# Patient Record
Sex: FEMALE | Race: WHITE | NOT HISPANIC OR LATINO | Employment: UNEMPLOYED | URBAN - METROPOLITAN AREA
[De-identification: names, ages, dates, MRNs, and addresses within clinical notes are randomized per-mention and may not be internally consistent; named-entity substitution may affect disease eponyms.]

---
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
## 2019-01-22 ENCOUNTER — INITIAL PRENATAL (OUTPATIENT)
Dept: OBGYN | Facility: CLINIC | Age: 19
End: 2019-01-22
Payer: COMMERCIAL

## 2019-01-22 ENCOUNTER — HOSPITAL ENCOUNTER (OUTPATIENT)
Facility: MEDICAL CENTER | Age: 19
End: 2019-01-22
Attending: NURSE PRACTITIONER
Payer: COMMERCIAL

## 2019-01-22 VITALS — BODY MASS INDEX: 28.65 KG/M2 | DIASTOLIC BLOOD PRESSURE: 58 MMHG | SYSTOLIC BLOOD PRESSURE: 110 MMHG | WEIGHT: 194 LBS

## 2019-01-22 DIAGNOSIS — Z34.81 ENCOUNTER FOR SUPERVISION OF OTHER NORMAL PREGNANCY, FIRST TRIMESTER: ICD-10-CM

## 2019-01-22 DIAGNOSIS — Z86.59 HISTORY OF ANXIETY: ICD-10-CM

## 2019-01-22 LAB
APPEARANCE UR: NORMAL
BILIRUB UR STRIP-MCNC: NORMAL MG/DL
COLOR UR AUTO: NORMAL
GLUCOSE UR STRIP.AUTO-MCNC: NEGATIVE MG/DL
KETONES UR STRIP.AUTO-MCNC: NEGATIVE MG/DL
LEUKOCYTE ESTERASE UR QL STRIP.AUTO: NEGATIVE
NITRITE UR QL STRIP.AUTO: NEGATIVE
PH UR STRIP.AUTO: 5.5 [PH] (ref 5–8)
PROT UR QL STRIP: NORMAL MG/DL
RBC UR QL AUTO: NEGATIVE
SP GR UR STRIP.AUTO: 1.03
UROBILINOGEN UR STRIP-MCNC: NORMAL MG/DL

## 2019-01-22 PROCEDURE — 81002 URINALYSIS NONAUTO W/O SCOPE: CPT | Performed by: NURSE PRACTITIONER

## 2019-01-22 PROCEDURE — 87480 CANDIDA DNA DIR PROBE: CPT

## 2019-01-22 PROCEDURE — 59401 PR NEW OB VISIT: CPT | Performed by: NURSE PRACTITIONER

## 2019-01-22 PROCEDURE — 87491 CHLMYD TRACH DNA AMP PROBE: CPT

## 2019-01-22 PROCEDURE — 87660 TRICHOMONAS VAGIN DIR PROBE: CPT

## 2019-01-22 PROCEDURE — 87591 N.GONORRHOEAE DNA AMP PROB: CPT

## 2019-01-22 PROCEDURE — 87510 GARDNER VAG DNA DIR PROBE: CPT

## 2019-01-22 RX ORDER — PNV NO.95/FERROUS FUM/FOLIC AC 28MG-0.8MG
1 TABLET ORAL DAILY
Qty: 30 TAB | Refills: 12 | Status: SHIPPED | OUTPATIENT
Start: 2019-01-22 | End: 2019-01-22

## 2019-01-22 NOTE — LETTER
2019      Jessie Carney is currently pregnant and being cared for by [facility].     She is medically cleared for:    1. Dental exams and routine cleaning  2. Tooth fillings and extractions as needed  3. Antibiotic therapy as appropriate  4. Local anesthesia  5. Abdominal shield for xrays    Patient may be administered the followin% Lidocaine with 1:100,000 Epinephrine  4% Septocaine with 1:100,000 Epinephrine  Nitrous Oxide  A narcotic or non-narcotic pain medication  An antibiotic such as penicillin or clindamycin    NO TETRACYCLINE and NO CODEINE and NO Ibuprofen        Thank you,          RENE Vincent.    Electronically Signed

## 2019-01-22 NOTE — LETTER
Cystic Fibrosis Carrier Testing  Jessie Carney    The following information is about a blood test that can be done to determine if you and/or your partner carry the gene for cystic fibrosis.    WHAT IS CYSTIC FIBROSIS?  · Cystic fibrosis (CF) is an inherited disease that affects more than 25,000 [place] children and young adults.  · Symptoms of CF vary but include lung congestion, pneumonia, diarrhea and poor growth.  Most people with CF have severe medical problems and some die at a young age.  Others have so few symptoms they are unaware they have CF.  · CF does not affect intelligence.  · Although there is no cure for CF at this time, scientists are making progress in improving treatment and in searching for a cure.  In the past many people with CF  at a very young age.  Today, many are living into their 20’s and 30’s.    IS THERE A CHANCE MY BABY COULD HAVE CYSTIC FIBROSIS?  · You can have a child with CF even if there is no history in your family (see chart below).  · CF testing can help determine if you are a carrier and at risk to have a child with CF.  Note: if both parents are carriers, there is a 1 in 4 (25%) chance with each pregnancy that they will have a child with CF.  · Carriers have one normal CF gene and one altered CF gene.  · People with CF have two altered CF genes.  · Most people have two normal copies of the CF gene.    Approximate risk that a couple with no family history of cystic fibrosis will have a child with cystic fibrosis:    Ethnic background / Risk     couple:  1 in 2,500   couple:  1 in 15,000            couple:  1 in 8,000     American couple:  1 in 32,000     WHAT TESTING IS AVAILABLE?  · There is a blood test that can be done to find out if you or your partner is a carrier.  · It is important to understand that CF carrier testing does not detect all CF carriers.  · If the test shows that you are both CF carriers, you unborn baby can  be tested to find out if the baby has CF.    HOW MUCH DOES IT COST TO HAVE CYSTIC FIBROSIS CARRIER TESTING?  · Cost and insurance coverage for CF carrier testing vary depending upon the laboratory used and your insurance policy.  · The average cost for CF carrier testing is $300 per person.  · Your genetic counselor can provide you with more information about cystic fibrosis carrier testing.    _____  Yes, I am interested in discussing carrier testing with a genetic counselor.    _____  No, I am not interested in CF carrier testing or in receiving more information about CF carrier testing.      Client signature: ________________________________________  1/22/2019

## 2019-01-22 NOTE — PROGRESS NOTES
"Subjective:   Jessie Carney is a 18 y.o.  who presents for her new OB exam.  She is 18w6d with an ABBEY of Estimated Date of Delivery: 19 by . She is feeling well and has no concerns at this time. Denies VB, LOF, contractions or pain. No ER visits or previous care in this pregnancy. Denies dysuria, vaginal DC, fever. Reports fetal movement. Desires AFP.  Declines CF.   Reports \"loosing mucous plug\" as she did with last pregnancy, boogery yellow green that comes out a little bit, no itch, no odor or irritation.     Past Medical History:   Diagnosis Date   • Anxiety and depression    • Asthma     Dx at age 1. not on meds   • Migraine        Psych Hx: Reports anxiety and depression on and off, currently stable, more easily irritable from pregnancy hormones.     Past Surgical History:   Procedure Laterality Date   • ANTONIO BY LAPAROSCOPY  8/3/2018    Procedure: ANTONIO BY LAPAROSCOPY;  Surgeon: Germán Watts M.D.;  Location: SURGERY [facility];  Service: General   • CYSTECTOMY  2018    face cyst removal Right side        OB History    Para Term  AB Living   4 1 1   2 1   SAB TAB Ectopic Molar Multiple Live Births   2         1      # Outcome Date GA Lbr Ralf/2nd Weight Sex Delivery Anes PTL Lv   4 Current            3 Term 17 40w1d  3.685 kg (8 lb 2 oz) F Vag-Spont None N HYUN      Birth Comments: IOL. pt states no complications   2 2016     SAB         Birth Comments: Passed on its own   2015     SAB         Birth Comments: passed on its own           Gynecological Hx: Denies any hx of STIs, including HSV. Denies any vulvovaginal disorders and no hx of abnormal cervical cytology. Last pap none.     Sexual Hx: One current male partner, who is FOB     Contraceptive Hx: Has used pills in the past and has since discontinued use.     Family History   Problem Relation Age of Onset   • No Known Problems Mother    • No Known Problems Father    • No Known Problems Brother  " "  • Cancer Maternal Grandmother    • No Known Problems Maternal Grandfather    • No Known Problems Paternal Grandmother    • No Known Problems Paternal Grandfather      Denies any genetic disorders in family history.     Social History     Social History   • Marital status: Single     Spouse name: N/A   • Number of children: N/A   • Years of education: N/A     Occupational History   • Not on file.     Social History Main Topics   • Smoking status: Current Some Day Smoker     Packs/day: 0.50     Years: 3.00     Types: Cigarettes   • Smokeless tobacco: Never Used      Comment: Trying to quit, cut back to 3-4 cigarettes/per day   • Alcohol use No   • Drug use: Yes     Types: Marijuana      Comment: last used \"few days ago\"   • Sexual activity: Yes     Partners: Male      Comment: None     Other Topics Concern   • Not on file     Social History Narrative   • No narrative on file       FOB is involved and lives with Jessie Carney.  Pregnancy is unplanned but desired.    She is currently not working, denies any heavy lifting or exposure to potential teratogens like environmental or occupational toxins.   Denies alcohol use. Reports tobacco use 3-4 a day, Reports marijuana use 1-2 week.   Denies any current or hx of sexual, emotional or physical abuse or trauma.     Current Medications: PNV   Allergies: Denies allergies to medications, food, or environmental allergies    Objective:      Vitals:    01/22/19 1532   BP: 110/58   Weight: 88 kg (194 lb)        See Prenatal Physical and Prenatal Vitals  UA WNL today      Assessment:      1.  IUP @ 18w6d per       2.  S=D      3.  See problem list as follows       Patient Active Problem List    Diagnosis Date Noted   • Penicillin allergy 09/22/2017         Plan:   -  GC/CT today   - Prenatal labs ordered - lab slip given  - AFP ordered   - Discussed PNV, nutrition, adequate water intake, and exercise/weight gain in pregnancy  - NOB informational packet with anticipatory " guidance given  - Information on Centering Pregnancy given, pt declines   - S/sx of pregnancy warning signs and PTL precautions given  - Complete OB US in 1 wks  - Return to [facility] in 4 wks

## 2019-01-22 NOTE — PROGRESS NOTES
"Pt here today for NOB visit  LMP: Unknown   Pt had  done on 12/04/2018 here in [facility] done by Dr. Nur.   WT: 194 lb  BP: 110/58  Pt states \" I've been leaking pieces or parts of my mucus plug\" states it looks \"snout and slimy\". States no concerns or complaints today.    Desires AFP   Influenza vaccine offered to patient today, pt states she will like to get the vaccine at her next appt.   Good # [phone]  "

## 2019-01-23 LAB
C TRACH DNA SPEC QL NAA+PROBE: NEGATIVE
CANDIDA DNA VAG QL PROBE+SIG AMP: POSITIVE
G VAGINALIS DNA VAG QL PROBE+SIG AMP: NEGATIVE
N GONORRHOEA DNA SPEC QL NAA+PROBE: NEGATIVE
SPECIMEN SOURCE: NORMAL
T VAGINALIS DNA VAG QL PROBE+SIG AMP: NEGATIVE

## 2019-02-05 ENCOUNTER — TELEPHONE (OUTPATIENT)
Dept: OBGYN | Facility: CLINIC | Age: 19
End: 2019-02-05

## 2019-02-05 NOTE — TELEPHONE ENCOUNTER
----- Message from ROYER Vincent sent at 1/23/2019 10:01 AM [facility] -----  Please let pt know we saw yeast on her swab but it could be something normal if she is not having any symptoms like itching or irritation which she was not reporting yesterday. She can do treatment monistat 7 nights if she likes or if she feels it is something normal, no treatment is necessary.      Called patient, no answer. Left VM asking patient to give us a call back to go over her most recent test results.

## 2019-02-06 ENCOUNTER — DATING (OUTPATIENT)
Dept: OBGYN | Facility: CLINIC | Age: 19
End: 2019-02-06

## 2019-02-06 ENCOUNTER — APPOINTMENT (OUTPATIENT)
Dept: RADIOLOGY | Facility: IMAGING CENTER | Age: 19
End: 2019-02-06
Attending: NURSE PRACTITIONER
Payer: COMMERCIAL

## 2019-02-06 DIAGNOSIS — Z34.81 ENCOUNTER FOR SUPERVISION OF OTHER NORMAL PREGNANCY, FIRST TRIMESTER: ICD-10-CM

## 2019-02-06 DIAGNOSIS — Z36.3 ANTENATAL SCREENING FOR MALFORMATION USING ULTRASONICS: ICD-10-CM

## 2019-02-06 PROCEDURE — 76805 OB US >/= 14 WKS SNGL FETUS: CPT | Performed by: OBSTETRICS & GYNECOLOGY

## 2019-02-06 PROCEDURE — 99999 US-OB 2ND 3RD TRI COMPLETE: CPT | Mod: 26 | Performed by: NURSE PRACTITIONER

## 2019-02-13 ENCOUNTER — TELEPHONE (OUTPATIENT)
Dept: OBGYN | Facility: CLINIC | Age: 19
End: 2019-02-13

## 2019-02-13 NOTE — TELEPHONE ENCOUNTER
GETACHEW to call back. GEOFFREY garcia.       Notes recorded by Bere Woody M.D. on 2/6/2019 at 4:19 PM PST  Patient needs a repeat ultrasound of the baby's heart in 2 weeks to ensure the anatomy is normal, please get her scheduled and let her know that we ordered a repeat u/s      ----- Message from ROYER Vincent sent at 1/23/2019 10:01 AM [facility] -----  Please let pt know we saw yeast on her swab but it could be something normal if she is not having any symptoms like itching or irritation which she was not reporting yesterday. She can do treatment monistat 7 nights if she likes or if she feels it is something normal, no treatment is necessary.

## 2019-02-19 ENCOUNTER — ROUTINE PRENATAL (OUTPATIENT)
Dept: OBGYN | Facility: CLINIC | Age: 19
End: 2019-02-19
Payer: COMMERCIAL

## 2019-02-19 ENCOUNTER — HOSPITAL ENCOUNTER (OUTPATIENT)
Dept: LAB | Facility: MEDICAL CENTER | Age: 19
End: 2019-02-19
Attending: NURSE PRACTITIONER
Payer: COMMERCIAL

## 2019-02-19 VITALS — SYSTOLIC BLOOD PRESSURE: 100 MMHG | BODY MASS INDEX: 30.42 KG/M2 | DIASTOLIC BLOOD PRESSURE: 54 MMHG | WEIGHT: 206 LBS

## 2019-02-19 DIAGNOSIS — Z34.81 ENCOUNTER FOR SUPERVISION OF OTHER NORMAL PREGNANCY, FIRST TRIMESTER: ICD-10-CM

## 2019-02-19 DIAGNOSIS — Z34.80 SUPERVISION OF OTHER NORMAL PREGNANCY, ANTEPARTUM: Primary | ICD-10-CM

## 2019-02-19 LAB
ABO GROUP BLD: NORMAL
BLD GP AB SCN SERPL QL: NORMAL
HIV 1+2 AB+HIV1 P24 AG SERPL QL IA: NON REACTIVE
RH BLD: NORMAL

## 2019-02-19 PROCEDURE — 86901 BLOOD TYPING SEROLOGIC RH(D): CPT

## 2019-02-19 PROCEDURE — 36415 COLL VENOUS BLD VENIPUNCTURE: CPT

## 2019-02-19 PROCEDURE — 81511 FTL CGEN ABNOR FOUR ANAL: CPT

## 2019-02-19 PROCEDURE — 81001 URINALYSIS AUTO W/SCOPE: CPT

## 2019-02-19 PROCEDURE — 86850 RBC ANTIBODY SCREEN: CPT

## 2019-02-19 PROCEDURE — 87389 HIV-1 AG W/HIV-1&-2 AB AG IA: CPT

## 2019-02-19 PROCEDURE — 86900 BLOOD TYPING SEROLOGIC ABO: CPT

## 2019-02-19 PROCEDURE — 87340 HEPATITIS B SURFACE AG IA: CPT

## 2019-02-19 PROCEDURE — 90040 PR PRENATAL FOLLOW UP: CPT | Performed by: NURSE PRACTITIONER

## 2019-02-19 PROCEDURE — 86780 TREPONEMA PALLIDUM: CPT

## 2019-02-19 PROCEDURE — 85025 COMPLETE CBC W/AUTO DIFF WBC: CPT

## 2019-02-19 PROCEDURE — 86762 RUBELLA ANTIBODY: CPT

## 2019-02-19 RX ORDER — PNV NO.95/FERROUS FUM/FOLIC AC 28MG-0.8MG
TABLET ORAL
Status: ON HOLD | COMMUNITY
End: 2023-02-09

## 2019-02-19 NOTE — PROGRESS NOTES
OB f/u. + fetal movement.  No VB, LOF or UC's.  Wt: 206lb      BP:100/54  Good phone # [phone]  Preferred pharmacy confirmed.  PNP, AFP not done yet planing to go today.  FLU vaccine decline.  Patient denies any vaginal itching or irritation.   FYI: Notes recorded by Bere Woody M.D. on 2/6/2019 at 4:19 PM PST  Patient needs a repeat ultrasound of the baby's heart in 2 weeks to ensure the anatomy is normal, please get her scheduled and let her know that we ordered a repeat u/s

## 2019-02-20 LAB
AMORPH CRY #/AREA URNS HPF: PRESENT /HPF
APPEARANCE UR: ABNORMAL
BACTERIA #/AREA URNS HPF: ABNORMAL /HPF
BASOPHILS # BLD AUTO: 0.7 % (ref 0–1.8)
BASOPHILS # BLD: 0.08 K/UL (ref 0–0.12)
BILIRUB UR QL STRIP.AUTO: NEGATIVE
CAOX CRY #/AREA URNS HPF: ABNORMAL /HPF
COLOR UR: YELLOW
EOSINOPHIL # BLD AUTO: 0.22 K/UL (ref 0–0.51)
EOSINOPHIL NFR BLD: 1.9 % (ref 0–6.9)
EPI CELLS #/AREA URNS HPF: ABNORMAL /HPF
ERYTHROCYTE [DISTWIDTH] IN BLOOD BY AUTOMATED COUNT: 45 FL (ref 35.9–50)
GLUCOSE UR STRIP.AUTO-MCNC: NEGATIVE MG/DL
HBV SURFACE AG SER QL: NEGATIVE
HCT VFR BLD AUTO: 37.9 % (ref 37–47)
HGB BLD-MCNC: 12.6 G/DL (ref 12–16)
HYALINE CASTS #/AREA URNS LPF: ABNORMAL /LPF
IMM GRANULOCYTES # BLD AUTO: 0.22 K/UL (ref 0–0.11)
IMM GRANULOCYTES NFR BLD AUTO: 1.9 % (ref 0–0.9)
KETONES UR STRIP.AUTO-MCNC: ABNORMAL MG/DL
LEUKOCYTE ESTERASE UR QL STRIP.AUTO: ABNORMAL
LYMPHOCYTES # BLD AUTO: 2.73 K/UL (ref 1–4.8)
LYMPHOCYTES NFR BLD: 23.4 % (ref 22–41)
MCH RBC QN AUTO: 29.9 PG (ref 27–33)
MCHC RBC AUTO-ENTMCNC: 33.2 G/DL (ref 33.6–35)
MCV RBC AUTO: 89.8 FL (ref 81.4–97.8)
MICRO URNS: ABNORMAL
MONOCYTES # BLD AUTO: 0.73 K/UL (ref 0–0.85)
MONOCYTES NFR BLD AUTO: 6.3 % (ref 0–13.4)
NEUTROPHILS # BLD AUTO: 7.7 K/UL (ref 2–7.15)
NEUTROPHILS NFR BLD: 65.8 % (ref 44–72)
NITRITE UR QL STRIP.AUTO: NEGATIVE
NRBC # BLD AUTO: 0 K/UL
NRBC BLD-RTO: 0 /100 WBC
PH UR STRIP.AUTO: 5 [PH]
PLATELET # BLD AUTO: 286 K/UL (ref 164–446)
PMV BLD AUTO: 9.4 FL (ref 9–12.9)
PROT UR QL STRIP: NEGATIVE MG/DL
RBC # BLD AUTO: 4.22 M/UL (ref 4.2–5.4)
RBC # URNS HPF: ABNORMAL /HPF
RBC UR QL AUTO: NEGATIVE
RUBV AB SER QL: 28.1 IU/ML
SP GR UR STRIP.AUTO: 1.03
TREPONEMA PALLIDUM IGG+IGM AB [PRESENCE] IN SERUM OR PLASMA BY IMMUNOASSAY: NON REACTIVE
UROBILINOGEN UR STRIP.AUTO-MCNC: 0.2 MG/DL
WBC # BLD AUTO: 11.7 K/UL (ref 4.8–10.8)
WBC #/AREA URNS HPF: ABNORMAL /HPF

## 2019-02-20 NOTE — PROGRESS NOTES
S) Pt is a 18 y.o.   at 22w6d  gestation. Routine prenatal care today. No complaints today. US order given to patient, will schedule appt today. Has not yet done any blood work, she is instructed to go after this appointment to get them drawn. Discussed Glucose testing at next appointment.  labor precautions discussed, all questions answered.    Fetal movement Normal  Cramping no  VB no  LOF no   Denies dysuria. Generally feels well today. Good self-care activities identified. Denies headaches, swelling, visual changes, or epigastric pain .     O) Blood pressure 100/54, weight 93.4 kg (206 lb), currently breastfeeding.        Labs:       PNL: Not done yet       GCT: Too early        AFP: Not done yet       GBS: N/A       Pertinent ultrasound -        19- Survey with questionable right sided myocardium thickening, remainder WNL, REYNA 14.19cm, c/w prev dating. Needs repeat scan in 2 weeks.    Repeat US scan scheduled 19    A) IUP at 22w6d       S=D         Patient Active Problem List    Diagnosis Date Noted   • Supervision of other normal pregnancy, antepartum 2019   • History of anxiety and depression 2019   • Penicillin allergy 2017          SVE: deferred       Chaperone offered: n/a         TDAP: no       FLU: no        BTL: no       : n/a       C/S Consent: n/a       IOL or C/S scheduled: no       LAST PAP: deferred due to age         P) s/s ptl vs general discomforts. Fetal movements reviewed. General ed and anticipatory guidance. Nutrition/exercise/vitamin. Plans breast Plans pp contraception- unsure  Continue PNV.

## 2019-02-21 LAB
# FETUSES US: NORMAL
AFP MOM SERPL: 1.13
AFP SERPL-MCNC: 91 NG/ML
AGE - REPORTED: 19.9 YR
CURRENT SMOKER: YES
FAMILY MEMBER DISEASES HX: NO
GA METHOD: NORMAL
GA: NORMAL WK
HCG MOM SERPL: 2.16
HCG SERPL-ACNC: NORMAL IU/L
HX OF HEREDITARY DISORDERS: NO
IDDM PATIENT QL: NO
INHIBIN A MOM SERPL: 1.69
INHIBIN A SERPL-MCNC: 536 PG/ML
INTEGRATED SCN PATIENT-IMP: NORMAL
PATHOLOGY STUDY: NORMAL
SPECIMEN DRAWN SERPL: NORMAL
U ESTRIOL MOM SERPL: 1.16
U ESTRIOL SERPL-MCNC: 3.27 NG/ML

## 2019-02-27 ENCOUNTER — APPOINTMENT (OUTPATIENT)
Dept: RADIOLOGY | Facility: IMAGING CENTER | Age: 19
End: 2019-02-27
Attending: OBSTETRICS & GYNECOLOGY
Payer: COMMERCIAL

## 2019-02-27 DIAGNOSIS — Z36.3 ANTENATAL SCREENING FOR MALFORMATION USING ULTRASONICS: ICD-10-CM

## 2019-02-27 PROCEDURE — 76815 OB US LIMITED FETUS(S): CPT | Performed by: OBSTETRICS & GYNECOLOGY

## 2019-03-06 ENCOUNTER — NON-PROVIDER VISIT (OUTPATIENT)
Dept: OBGYN | Facility: CLINIC | Age: 19
End: 2019-03-06
Payer: COMMERCIAL

## 2019-03-06 ENCOUNTER — TELEPHONE (OUTPATIENT)
Dept: OBGYN | Facility: CLINIC | Age: 19
End: 2019-03-06

## 2019-03-06 DIAGNOSIS — O26.892 DYSURIA DURING PREGNANCY IN SECOND TRIMESTER: ICD-10-CM

## 2019-03-06 DIAGNOSIS — R30.0 DYSURIA DURING PREGNANCY IN SECOND TRIMESTER: ICD-10-CM

## 2019-03-06 LAB
APPEARANCE UR: NORMAL
BILIRUB UR STRIP-MCNC: NORMAL MG/DL
COLOR UR AUTO: NORMAL
GLUCOSE UR STRIP.AUTO-MCNC: NEGATIVE MG/DL
KETONES UR STRIP.AUTO-MCNC: NEGATIVE MG/DL
LEUKOCYTE ESTERASE UR QL STRIP.AUTO: NORMAL
NITRITE UR QL STRIP.AUTO: NEGATIVE
PH UR STRIP.AUTO: 6.5 [PH] (ref 5–8)
PROT UR QL STRIP: NEGATIVE MG/DL
RBC UR QL AUTO: NEGATIVE
SP GR UR STRIP.AUTO: 1.02
UROBILINOGEN UR STRIP-MCNC: NORMAL MG/DL

## 2019-03-06 PROCEDURE — 81002 URINALYSIS NONAUTO W/O SCOPE: CPT | Performed by: PHYSICIAN ASSISTANT

## 2019-03-06 NOTE — TELEPHONE ENCOUNTER
Pt called c/o pain and burning with urination x 10 days and today she started having mid back pain, pt states she is well hydrated and wants to know if she can be seen today. Consulted with FAITH Rea, she recommends patient to come in for nurse visit and give us a urine sample. Pt notified and agrees, scheduled for nursed visit. Pt had no other questions or concerns

## 2019-03-13 DIAGNOSIS — O28.3 ABNORMAL FETAL ULTRASOUND: ICD-10-CM

## 2019-03-19 ENCOUNTER — ROUTINE PRENATAL (OUTPATIENT)
Dept: OBGYN | Facility: CLINIC | Age: 19
End: 2019-03-19
Payer: COMMERCIAL

## 2019-03-19 VITALS — BODY MASS INDEX: 31.31 KG/M2 | WEIGHT: 212 LBS | DIASTOLIC BLOOD PRESSURE: 58 MMHG | SYSTOLIC BLOOD PRESSURE: 110 MMHG

## 2019-03-19 DIAGNOSIS — Z34.80 SUPERVISION OF OTHER NORMAL PREGNANCY, ANTEPARTUM: Primary | ICD-10-CM

## 2019-03-19 PROCEDURE — 90040 PR PRENATAL FOLLOW UP: CPT | Performed by: NURSE PRACTITIONER

## 2019-03-19 NOTE — PROGRESS NOTES
OB f/u. + fetal movement.  No VB, LOF or UC's.  Wt:212lb       BP: 110/58  Good phone # [phone]  Preferred pharmacy confirmed.  3rd trimester lab orders pended and instructions given to patient  Patient needs to be informed about ultrasound findings and referral to Dr Auguste

## 2019-03-19 NOTE — PROGRESS NOTES
S) Pt is a 18 y.o.   at 26w6d  gestation. Routine prenatal care today. No complaints today. Discussed referral to Dr Auguste, will call and make sure that referral gets sent and appt gets scheduled. 3rd trimester labs ordered today,  labor precautions discussed. All questions answered.    Fetal movement Normal  Cramping no  VB no  LOF no   Denies dysuria. Generally feels well today. Good self-care activities identified. Denies headaches, swelling, visual changes, or epigastric pain .     O) Blood pressure 110/58, weight 96.2 kg (212 lb), currently breastfeeding.        Labs:       PNL: WNL       GCT: Ordered today        AFP: normal       GBS: N/A       Pertinent ultrasound -        19- Survey WNL except for ?right sided myocardium thickening, REYNA 14.19cm, c/w prev dating. Recommend repeat US in 2 weeks to recheck heart.  19- Recheck fetal heart- Survey shows right ventricular myocardial thickening, remainder WNL, REYNA 17.56cm, c/w prev dating. Recommend fetal echo. Referral placed to perinatology.    A) IUP at 26w6d       S=D         Patient Active Problem List    Diagnosis Date Noted   • Abnormal fetal ultrasound 2019   • Supervision of other normal pregnancy, antepartum 2019   • History of anxiety and depression 2019   • Penicillin allergy 2017          SVE: deferred       Chaperone offered: n/a         TDAP: no       FLU: no        BTL: no       : n/a       C/S Consent: n/a       IOL or C/S scheduled: no       LAST PAP: deferred due to age         P) s/s ptl vs general discomforts. Fetal movements reviewed. General ed and anticipatory guidance. Nutrition/exercise/vitamin. Plans breast Plans pp contraception- unsure  Continue PNV.

## 2019-03-19 NOTE — PROGRESS NOTES
Referral faxed to Dr. VALVERDE on 3/19/19  They will contact patient to schedule appt.  Please check with patient if appt was made/ document. Thank you

## 2019-03-25 ENCOUNTER — TELEPHONE (OUTPATIENT)
Dept: OBGYN | Facility: CLINIC | Age: 19
End: 2019-03-25

## 2019-03-25 NOTE — TELEPHONE ENCOUNTER
Pt LM on VM stating she called Dr. Auguste's office and was told they do not have a referral for her.   Called pt, unable to reach her. LMTCB

## 2019-03-27 ENCOUNTER — HOSPITAL ENCOUNTER (OUTPATIENT)
Dept: LAB | Facility: MEDICAL CENTER | Age: 19
End: 2019-03-27
Attending: NURSE PRACTITIONER
Payer: COMMERCIAL

## 2019-03-27 DIAGNOSIS — Z34.80 SUPERVISION OF OTHER NORMAL PREGNANCY, ANTEPARTUM: ICD-10-CM

## 2019-03-27 LAB
GLUCOSE 1H P 50 G GLC PO SERPL-MCNC: 89 MG/DL (ref 70–139)
HCT VFR BLD AUTO: 36.9 % (ref 37–47)
HGB BLD-MCNC: 12 G/DL (ref 12–16)
TREPONEMA PALLIDUM IGG+IGM AB [PRESENCE] IN SERUM OR PLASMA BY IMMUNOASSAY: NON REACTIVE

## 2019-03-27 PROCEDURE — 36415 COLL VENOUS BLD VENIPUNCTURE: CPT

## 2019-03-27 PROCEDURE — 85018 HEMOGLOBIN: CPT

## 2019-03-27 PROCEDURE — 86780 TREPONEMA PALLIDUM: CPT

## 2019-03-27 PROCEDURE — 85014 HEMATOCRIT: CPT

## 2019-03-27 PROCEDURE — 82950 GLUCOSE TEST: CPT

## 2019-04-09 ENCOUNTER — ROUTINE PRENATAL (OUTPATIENT)
Dept: OBGYN | Facility: CLINIC | Age: 19
End: 2019-04-09
Payer: COMMERCIAL

## 2019-04-09 VITALS — WEIGHT: 214 LBS | BODY MASS INDEX: 31.6 KG/M2 | DIASTOLIC BLOOD PRESSURE: 62 MMHG | SYSTOLIC BLOOD PRESSURE: 130 MMHG

## 2019-04-09 DIAGNOSIS — B37.31 YEAST INFECTION OF THE VAGINA: ICD-10-CM

## 2019-04-09 DIAGNOSIS — Z34.80 SUPERVISION OF OTHER NORMAL PREGNANCY, ANTEPARTUM: Primary | ICD-10-CM

## 2019-04-09 PROCEDURE — 90715 TDAP VACCINE 7 YRS/> IM: CPT | Performed by: PHYSICIAN ASSISTANT

## 2019-04-09 PROCEDURE — 90040 PR PRENATAL FOLLOW UP: CPT | Performed by: PHYSICIAN ASSISTANT

## 2019-04-09 PROCEDURE — 90471 IMMUNIZATION ADMIN: CPT | Performed by: PHYSICIAN ASSISTANT

## 2019-04-09 RX ORDER — FLUCONAZOLE 150 MG/1
150 TABLET ORAL DAILY
Qty: 2 TAB | Refills: 0 | Status: ON HOLD | OUTPATIENT
Start: 2019-04-09 | End: 2019-05-30

## 2019-04-09 NOTE — PROGRESS NOTES
"Pt here today for OB follow up  Pt states has been leaking fluid past 2 weeks, states she has \"bits and pieces \" of mucus plug  Reports +  Good # [phone]  Pharmacy Confirmed.  Chaperone offered and provided.   KAYLYNN sheet instructions given  Pt desires Tdap  No BTL due to age  Tdap vaccine given. Right Deltoid. VIS given and screening check list reviewed with pt. Vaccine verified by Keli   "

## 2019-04-09 NOTE — PROGRESS NOTES
Pt has no complaints with cramping, regular or strong UCs, Vb, LOF, though pt has had incr vag d/c with watery appearance and pain during intercourse. +FM - FKC sheet given today. TDaP given today. Declines BTL. 1hr GTT, H/H, RPR wnl - pt notified.     SSE: Neg pooling, neg valsalva, neg fern, neg nitrazine  Cervix: Ft/th/floating  Wet mt: +Yeast    A/P: IUP at 29w6d - not in PTL, +yeast infection - Diflucan rx called in today. PTL precautions stressed. Daily FKC. RTC 2 wk or sooner prn.

## 2019-04-09 NOTE — LETTER
"Count Your Baby's Movements  Another step to a healthy delivery    Jessie Carney,             Dept: [phone]    How Many Weeks Pregnant? 29w6d    Date to Begin Countin19              How to use this chart    One way for your physician to keep track of your baby's health is by knowing how often the baby moves (or \"kicks\") in your womb.  You can help your physician to do this by using this chart every day.    Every day, you should see how many hours it takes for your baby to move 10 times.  Start in the morning, as soon as you get up.    · First, write down the time your baby moves until you get to 10.  · Check off one box every time your baby moves until you get to 10.  · Write down the time you finished counting in the last column.  · Total how long it took to count up all 10 movements.  · Finally, fill in the box that shows how long this took.  After counting 10 movements, you no longer have to count any more that day.  The next morning, just start counting again as soon as you get up.    What should you call a \"movement\"?  It is hard to say, because it will feel different from one mother to another and from one pregnancy to the next.  The important thing is that you count the movements the same way throughout your pregnancy.  If you have more questions, you should ask your physician.    Count carefully every day!  SAMPLE:  Week 28    How many hours did it take to feel 10 movements?       Start  Time     1     2     3     4     5     6     7     8     9     10   Finish Time   Mon 8:20 ·  ·  ·  ·  ·  ·  ·  ·  ·  ·  11:40                  Sat               Sun                 IMPORTANT: You should contact your physician if it takes more than two hours for you to feel 10 movements.  Each morning, write down the time and start to count the movements of your baby.  Keep track by checking off one box every time you feel one movement.  When you have felt " "10 \"kicks\", write down the time you finished counting in the last column.  Then fill in the   box (over the check erick) for the number of hours it took.  Be sure to read the complete instructions on the previous page.            "

## 2019-04-23 ENCOUNTER — ROUTINE PRENATAL (OUTPATIENT)
Dept: OBGYN | Facility: CLINIC | Age: 19
End: 2019-04-23
Payer: COMMERCIAL

## 2019-04-23 VITALS — WEIGHT: 219 LBS | SYSTOLIC BLOOD PRESSURE: 120 MMHG | BODY MASS INDEX: 32.34 KG/M2 | DIASTOLIC BLOOD PRESSURE: 80 MMHG

## 2019-04-23 DIAGNOSIS — B37.31 YEAST INFECTION OF THE VAGINA: ICD-10-CM

## 2019-04-23 DIAGNOSIS — Z34.80 SUPERVISION OF OTHER NORMAL PREGNANCY, ANTEPARTUM: ICD-10-CM

## 2019-04-23 PROCEDURE — 90040 PR PRENATAL FOLLOW UP: CPT | Performed by: PHYSICIAN ASSISTANT

## 2019-04-23 RX ORDER — FLUCONAZOLE 150 MG/1
150 TABLET ORAL DAILY
Qty: 2 TAB | Refills: 0 | Status: ON HOLD | OUTPATIENT
Start: 2019-04-23 | End: 2019-05-30

## 2019-04-23 NOTE — PROGRESS NOTES
Pt here today for OB follow up  Pt states she is having irregular contractions  Reports +RUBIN Aleman # [phone]  Pharmacy Confirmed.  Chaperone offered and declined.

## 2019-04-23 NOTE — PROGRESS NOTES
Pt has no complaints with cramping, UCs, Vb, LOF, though pt feels incr pressure with sharp pains in vaginal area daily. Pt also has pain in R hip, so urged to try stretching, warm and cool packs, Tylenol prn. May consider Flexeril if not improving by next visit. +FM. PANN appt with Dr Auguste 2 wks ago wnl, no f/u there, but will need PP echo for baby. Pt aware. Diflucan rx called in again, as last rx not received. RTC 2 wk or sooner prn.

## 2019-04-25 ENCOUNTER — DATING (OUTPATIENT)
Dept: OBGYN | Facility: CLINIC | Age: 19
End: 2019-04-25

## 2019-04-25 DIAGNOSIS — O28.3 ABNORMAL FETAL ULTRASOUND: ICD-10-CM

## 2019-05-06 ENCOUNTER — HOSPITAL ENCOUNTER (OUTPATIENT)
Facility: MEDICAL CENTER | Age: 19
End: 2019-05-06
Attending: OBSTETRICS & GYNECOLOGY | Admitting: OBSTETRICS & GYNECOLOGY
Payer: COMMERCIAL

## 2019-05-06 VITALS
HEIGHT: 69 IN | SYSTOLIC BLOOD PRESSURE: 122 MMHG | HEART RATE: 115 BPM | DIASTOLIC BLOOD PRESSURE: 68 MMHG | TEMPERATURE: 98.2 F | WEIGHT: 220 LBS | RESPIRATION RATE: 18 BRPM | BODY MASS INDEX: 32.58 KG/M2

## 2019-05-06 LAB
APPEARANCE UR: ABNORMAL
COLOR UR AUTO: ABNORMAL
FIBRONECTIN FETAL SPEC QL: NEGATIVE
GLUCOSE UR QL STRIP.AUTO: NEGATIVE MG/DL
KETONES UR QL STRIP.AUTO: ABNORMAL MG/DL
LEUKOCYTE ESTERASE UR QL STRIP.AUTO: ABNORMAL
NITRITE UR QL STRIP.AUTO: NEGATIVE
PH UR STRIP.AUTO: 7 [PH]
PROT UR QL STRIP: 30 MG/DL
RBC UR QL AUTO: NEGATIVE
SP GR UR: 1.02

## 2019-05-06 PROCEDURE — 82731 ASSAY OF FETAL FIBRONECTIN: CPT

## 2019-05-06 PROCEDURE — 81002 URINALYSIS NONAUTO W/O SCOPE: CPT

## 2019-05-06 PROCEDURE — 87086 URINE CULTURE/COLONY COUNT: CPT

## 2019-05-06 PROCEDURE — 59025 FETAL NON-STRESS TEST: CPT

## 2019-05-06 PROCEDURE — 99213 OFFICE O/P EST LOW 20 MIN: CPT | Performed by: FAMILY MEDICINE

## 2019-05-06 NOTE — PROGRESS NOTES
LABOR AND DELIVERY TRIAGE NOTE    PATIENT ID:  NAME:  Jessie Careny  MRN:               [id number]  YOB: 2000    CC:  Contractions    HPI:  Jessie Carney is a 18 y.o. female  at 33w5d by a 11 week ultrasound performed on 19/LMP on No LMP recorded. Patient is pregnant.. Estimated Date of Delivery: 19  Patient presents complaining of uterine contractions, with no loss of fluid.  normal fetal movement.  no vaginal bleeding.  Pregnancy was uncomplicated.    ROS: Patient denies any fever chills, nausea, vomiting, headache, chest pain, shortness of breath, or dysuria or unusual swelling of hands or feet.     Prenatal Care: Obtained at [facility]    Prenatal Labs:   HepBsAg: Neg HIV: Neg Rubella: Imm   RPR: Neg PAP: N/A GBS: N/A   GC/CT: Neg A+/ Ab Neg Quad Screen: Neg   No results for input(s): WBC, RBC, HEMOGLOBIN, HEMATOCRIT, MCV, MCH, RDW, PLATELETCT, MPV, NEUTSPOLYS, LYMPHOCYTES, MONOCYTES, EOSINOPHILS, BASOPHILS, RBCMORPHOLO in the last 72 hours.  No results for input(s): SODIUM, POTASSIUM, CHLORIDE, CO2, GLUCOSE, BUN, CPKTOTAL in the last 72 hours.       IMAGING: OB ultrasound:   2019 3:15 PM    HISTORY/REASON FOR EXAM:  Follow-up fetal heart    TECHNIQUE/EXAM DESCRIPTION: OB limited ultrasound.    COMPARISON:  Obstetrical ultrasound 2019    FINDINGS:  Fetal Lie:  Transverse    Placenta (Location):  Posterior  Placenta Previa: No  Placental Grade: I    Amniotic Fluid Volume:  REYNA = 17.56 cm    Fetal Heart Rate:  155 bpm    No maternal adnexal mass is identified.    Working ABBEY:  2019    Comments:  There is still a question of thickening of the right ventricular myocardium.   Impression       1.  Single intrauterine pregnancy with an estimated date of delivery of 2019.  2.  There is still a question of right ventricular myocardial thickening.         POB Hx:  OB History    Para Term  AB Living   4 1 1   2 1   SAB TAB Ectopic Molar Multiple Live Births   2          1      # Outcome Date GA Lbr Ralf/2nd Weight Sex Delivery Anes PTL Lv   4 Current            3 Term 12/06/17 40w1d  3.685 kg (8 lb 2 oz) F Vag-Spont None N HYUN      Birth Comments: IOL. pt states no complications   2 SAB 2016     SAB         Birth Comments: Passed on its own   1 SAB 2015     SAB         Birth Comments: passed on its own          PMH/Problem List:    Past Medical History:   Diagnosis Date   • Asthma     Dx at age 1. not on meds   • Migraine      Patient Active Problem List    Diagnosis Date Noted   • Abnormal fetal ultrasound - needs fetal echo in PP 03/13/2019   • Supervision of other normal pregnancy, antepartum 02/19/2019   • History of anxiety and depression 01/22/2019   • Penicillin allergy 09/22/2017       Current Outpatient Medications:  No current facility-administered medications on file prior to encounter.      Current Outpatient Prescriptions on File Prior to Encounter   Medication Sig Dispense Refill   • fluconazole (DIFLUCAN) 150 MG tablet Take 1 Tab by mouth every day. Then 72 hours later, take 1 tab by mouth 2 Tab 0   • fluconazole (DIFLUCAN) 150 MG tablet Take 1 Tab by mouth every day. Then 72 hours later, take 1 tab by mouth. (Patient not taking: Reported on 4/23/2019) 2 Tab 0   • Prenatal Vit-Fe Fumarate-FA (PRENATAL VITAMIN) 27-0.8 MG Tab Take  by mouth.     • doxycycline (VIBRAMYCIN) 100 MG Tab Take 100 mg by mouth 2 times a day. 7 day course started 7/30/18         PSH:    Past Surgical History:   Procedure Laterality Date   • ANTONIO BY LAPAROSCOPY  8/3/2018    Procedure: ANTONIO BY LAPAROSCOPY;  Surgeon: Germán Watts M.D.;  Location: SURGERY [facility];  Service: General   • CYSTECTOMY  07/30/2018    face cyst removal Right side       Allergies:   Allergies   Allergen Reactions   • Lanolin Rash     .   • Pcn [Penicillins] Rash     .     • Sulfa Drugs Rash     .         SH:  Social History     Social History   • Marital status: Single     Spouse name: N/A   • Number of  "children: N/A   • Years of education: N/A     Occupational History   • Not on file.     Social History Main Topics   • Smoking status: Current Some Day Smoker     Packs/day: 0.50     Years: 3.00     Types: Cigarettes   • Smokeless tobacco: Never Used      Comment: Trying to quit, cut back to 3-4 cigarettes/per day   • Alcohol use No   • Drug use: Yes     Types: Marijuana      Comment: last used \"few days ago\"   • Sexual activity: Yes     Partners: Male      Comment: None     Other Topics Concern   • Not on file     Social History Narrative   • No narrative on file         PHYSICAL EXAM:  Vitals:    19 1409   BP: 122/68   Pulse: (!) 115   Resp: 18   Temp: 36.8 °C (98.2 °F)   TempSrc: Temporal   Weight: 99.8 kg (220 lb)   Height: 1.74 m (5' 8.5\")     Temp (24hrs), Av.8 °C (98.2 °F), Min:36.8 °C (98.2 °F), Max:36.8 °C (98.2 °F)    General: No acute distress, resting comfortably in bed.  HEENT: normocephalic, nontraumatic, PERRLA, EOMI  Cardiovascular: Heart RRR with no murmurs, rubs or gallops. Distal Pulses 2+  Respiratory: symmetric chest expansion, lungs CTA bilaterally with no wheezes rales or  rhonci  Abdomen: gravid, nontender  Musculoskeletal: strength 5/5 in four extremities  Neuro: non focal with no numbness, tingling or changes in sensation    SVE: FT/Thick/High  [place]: Q20 minutes; EFM: 130 with accels to 155    A/P: Intrauterine pregancy at 33w5d weeks here for Contractions.    1. Unchanged cervix, fetal fibronectin negative  2.  Follow-up with regularly scheduled prenatal appointment.  3.  Urinalysis turbid with positive leukocytes, culture pending.    Johnathon Holman M.D.    "

## 2019-05-06 NOTE — PROGRESS NOTES
1407 - Patient of [facility] presents with c/o contractions. [place] Gestation - 33.5 weeks    Reports contractions started around 0600 today and increased in frequency from every 25 minutes to every 15 minutes in the past 2 hours. Discomfort is constant but more uncomfortable with walking. Denies problems with Pregnancy - not in PNR that  Baby needs a fetal echo at delivery; perinatology consulting. Denies ROM or Bleeding. Reports mucous every week.  Denies change to vision/edema/HA, Reports FM. FM/TOCO use discussed and placed, POC discussed. UA provided    History of term .  Denies vaginal activity in the past few days.      Patient is eager for her cervix to be checked, states she was 1cm at 28 weeks.   Patient encouraged to call RN with all questions concerns needs prn.    1500 - Report to Dr. Holman regarding patient arrival/complaint/status. Order received for FFN and SVE. POC discussed with patient. FFN collected prior to SVE. SVE (ext. Os 1cm) ft/thick/-3 no blood or fluid noted on exam glove.   Patient reports contractions are still occurring but may be less then at home.    1630 - Update to Dr. Holman, FFN negative. Order received for discharge. Urine sent to the lab for culture. Patient discharged home with specific instruction to return to L&D/Physician ie.. Bleeding/ROM/decreased FM/labor/concerns for self or baby. Patient denies questions/concerns regarding care since arrival to [facility]. Patient will follow up at her scheduled appointment this Friday. Walking out of the hospital with FOB.

## 2019-05-06 NOTE — DISCHARGE INSTRUCTIONS
General Instructions:  · If you think you are in labor, time contractions (lying on your left side) from the beginning of one contraction to the beginning of the next contraction for at least one hour.  · Increase fluid intake: you should consume 10-12 8 oz glasses of non-caffeinated fluid per day.  · Report any pressure or burning on urination to your physician.  · Monitor fetal movement: If you notice an absence or decrease in fetal movement, drink a large glass of water and rest on your side.  If there is no increase in movement, call your physician or go to the hospital for further evaluation.  · Report any sudden, sharp abdominal pain.  · Report any bleeding.  Spotting or pinkish discharge is normal after vaginal exam.  You may also spot after sexual intercourse.    Pre-term Labor (<37 weeks):  Call your physician or return to the hospital if:  · You have painless regular contractions more than 4 in one hour.  · Your water breaks (remember time and color).  · You have menstrual-like cramps, a low dull backache or pressure in your pelvis or back.  · Your baby does not move enough to complete the daily kick count (10 movements in 2 hours).  · Your baby moves much less often than on the days before or you have not felt your baby move all day.  · Please review the MEDICATION LIST section of your AFTER VISIT SUMMARY document.  · Take your medication as prescribed      Rest and Hydrate with water

## 2019-05-08 DIAGNOSIS — B96.89 BV (BACTERIAL VAGINOSIS): ICD-10-CM

## 2019-05-08 DIAGNOSIS — N76.0 BV (BACTERIAL VAGINOSIS): ICD-10-CM

## 2019-05-08 LAB
BACTERIA UR CULT: ABNORMAL
BACTERIA UR CULT: ABNORMAL
SIGNIFICANT IND 70042: ABNORMAL
SITE SITE: ABNORMAL
SOURCE SOURCE: ABNORMAL

## 2019-05-08 RX ORDER — METRONIDAZOLE 500 MG/1
500 TABLET ORAL 2 TIMES DAILY
Qty: 14 TAB | Refills: 0 | Status: SHIPPED | OUTPATIENT
Start: 2019-05-08 | End: 2019-05-15

## 2019-05-10 ENCOUNTER — ROUTINE PRENATAL (OUTPATIENT)
Dept: OBGYN | Facility: CLINIC | Age: 19
End: 2019-05-10
Payer: COMMERCIAL

## 2019-05-10 VITALS — BODY MASS INDEX: 33.41 KG/M2 | SYSTOLIC BLOOD PRESSURE: 116 MMHG | WEIGHT: 223 LBS | DIASTOLIC BLOOD PRESSURE: 64 MMHG

## 2019-05-10 DIAGNOSIS — Z34.80 SUPERVISION OF OTHER NORMAL PREGNANCY, ANTEPARTUM: ICD-10-CM

## 2019-05-10 PROBLEM — N76.0 BV (BACTERIAL VAGINOSIS): Status: RESOLVED | Noted: 2019-05-08 | Resolved: 2019-05-10

## 2019-05-10 PROBLEM — B96.89 BV (BACTERIAL VAGINOSIS): Status: RESOLVED | Noted: 2019-05-08 | Resolved: 2019-05-10

## 2019-05-10 PROCEDURE — 90040 PR PRENATAL FOLLOW UP: CPT | Performed by: PHYSICIAN ASSISTANT

## 2019-05-10 NOTE — PROGRESS NOTES
Pt. Here for OB/FU. Reports Good FM.   Good # [phone]  Pt states went to [facility] on 5/6/19 for contractions.   Pharmacy verified.

## 2019-05-10 NOTE — PROGRESS NOTES
Pt has no complaints with cramping, strong or regular UCs, Vb, LOF. +FM. Pt was seen in L&D 4 days ago for UCs, but sent home. Per pt, 1cm dilated. PTL precautions stressed today. GBS next visit. Daily FKC recommended. RTC 2 wk or sooner prn.

## 2019-05-19 ENCOUNTER — HOSPITAL ENCOUNTER (OUTPATIENT)
Facility: MEDICAL CENTER | Age: 19
End: 2019-05-19
Attending: OBSTETRICS & GYNECOLOGY | Admitting: OBSTETRICS & GYNECOLOGY
Payer: COMMERCIAL

## 2019-05-19 VITALS
BODY MASS INDEX: 33.8 KG/M2 | WEIGHT: 223 LBS | TEMPERATURE: 97.8 F | DIASTOLIC BLOOD PRESSURE: 71 MMHG | HEIGHT: 68 IN | HEART RATE: 112 BPM | SYSTOLIC BLOOD PRESSURE: 121 MMHG

## 2019-05-19 PROCEDURE — 59025 FETAL NON-STRESS TEST: CPT | Mod: 26 | Performed by: NURSE PRACTITIONER

## 2019-05-19 PROCEDURE — 87150 DNA/RNA AMPLIFIED PROBE: CPT

## 2019-05-19 PROCEDURE — 87081 CULTURE SCREEN ONLY: CPT

## 2019-05-19 PROCEDURE — 81002 URINALYSIS NONAUTO W/O SCOPE: CPT

## 2019-05-19 PROCEDURE — 59025 FETAL NON-STRESS TEST: CPT

## 2019-05-19 PROCEDURE — 84112 EVAL AMNIOTIC FLUID PROTEIN: CPT

## 2019-05-20 LAB
APPEARANCE UR: CLEAR
COLOR UR AUTO: YELLOW
GLUCOSE UR QL STRIP.AUTO: NEGATIVE MG/DL
KETONES UR QL STRIP.AUTO: NEGATIVE MG/DL
LEUKOCYTE ESTERASE UR QL STRIP.AUTO: ABNORMAL
NITRITE UR QL STRIP.AUTO: NEGATIVE
PH UR STRIP.AUTO: 7 [PH]
PROT UR QL STRIP: NEGATIVE MG/DL
RBC UR QL AUTO: NEGATIVE
SP GR UR: 1.01

## 2019-05-20 NOTE — PROGRESS NOTES
2018- Pt presented to labor unit with possible SROM at 2300 last night. Denies UC's or VB. Abd soft and non tender to touch. EFM and TOCO applied. Sterile spec done, no pooling visualized. Amnisure collected and sent. SVE FT/thick/high. POC discussed with pt. ALEXANDRA Slade CNM notified of pt status.     2230- ALEXANDRA Slade CNM notified of pt negative amnisure. Orders received to collect GBS and discharge home.     2234- GBS collected.    2244- Discharge instructions given and explained. Pt verbalized understanding.    2245- Pt discharged home undelivered. FOB at bedside.

## 2019-05-20 NOTE — PROGRESS NOTES
S) patient is a 18 yoa G 4 P 1021 at 35 4/7 week gestation who presents to triage for possible leaking of fluid. No other presenting issues. Asserts good fetal movement and no regular UC's  O) amnisure negative       FHT per my read baseline 120, pos accels, no decels, moderate variability. No UC's noted.   A) membranes intact       Category one nst        Reassuring maternal/fetal status  P) GBS sent. Patient DC'd home stable.

## 2019-05-21 LAB — GP B STREP DNA SPEC QL NAA+PROBE: NEGATIVE

## 2019-05-22 LAB — A1 MICROGLOB PLACENTAL VAG QL: NEGATIVE

## 2019-05-24 ENCOUNTER — ROUTINE PRENATAL (OUTPATIENT)
Dept: OBGYN | Facility: CLINIC | Age: 19
End: 2019-05-24
Payer: COMMERCIAL

## 2019-05-24 VITALS — SYSTOLIC BLOOD PRESSURE: 112 MMHG | DIASTOLIC BLOOD PRESSURE: 68 MMHG | WEIGHT: 226 LBS | BODY MASS INDEX: 34.36 KG/M2

## 2019-05-24 DIAGNOSIS — Z34.80 SUPERVISION OF OTHER NORMAL PREGNANCY, ANTEPARTUM: Primary | ICD-10-CM

## 2019-05-24 PROCEDURE — 90040 PR PRENATAL FOLLOW UP: CPT | Performed by: NURSE PRACTITIONER

## 2019-05-24 NOTE — PROGRESS NOTES
S) Pt is a 18 y.o.   at 36w2d  gestation. Routine prenatal care today. Pt and family just got over GI bug yesterday.  Able to tolerate oral intake since last night.    Fetal movement Normal  Cramping no  VB no  LOF no   Denies dysuria. Generally feels well today. Good self-care activities identified. Denies headaches, swelling, visual changes, or epigastric pain .     O) /68   Wt 102.5 kg (226 lb)         Labs: WNL       PNL: WNL       GCT: 89       AFP: normal       GBS: negative       Pertinent ultrasound - 19 REYNA 14.19, survey with thickening of rt myocardium, c/w prev dating       19 REYNA 17.56, rt ventricular myocardial thickening      4/10/19 Dr Auguste, normal fetal echo, rec  echo  A) IUP at 36w2d       S=D         Patient Active Problem List    Diagnosis Date Noted   • Abnormal fetal ultrasound - needs fetal echo in PP 2019   • Supervision of other normal pregnancy, antepartum 2019   • History of anxiety and depression 2019   • Penicillin allergy 2017          SVE: deferred         TDAP: yes       FLU: no        BTL: no       : no       C/S Consent: no       IOL or C/S scheduled: no       LAST PAP: def d/t age         P) s/s ptl vs general discomforts. Fetal movements reviewed. General ed and anticipatory guidance. Nutrition/exercise/vitamin. Plans breast Plans pp contraception- unsure    RTC 1 week or PRN

## 2019-05-24 NOTE — PROGRESS NOTES
Pt here today for OB follow up  Pt states having some cx's and losing mucus plug.   Reports +FM  Good # [phone]  Pharmacy Confirmed.  Chaperone offered and declined.   GBS negative

## 2019-05-28 ENCOUNTER — APPOINTMENT (OUTPATIENT)
Dept: RADIOLOGY | Facility: MEDICAL CENTER | Age: 19
End: 2019-05-28
Attending: STUDENT IN AN ORGANIZED HEALTH CARE EDUCATION/TRAINING PROGRAM
Payer: COMMERCIAL

## 2019-05-28 ENCOUNTER — HOSPITAL ENCOUNTER (OUTPATIENT)
Facility: MEDICAL CENTER | Age: 19
End: 2019-05-28
Attending: OBSTETRICS & GYNECOLOGY | Admitting: OBSTETRICS & GYNECOLOGY
Payer: COMMERCIAL

## 2019-05-28 VITALS — BODY MASS INDEX: 34.25 KG/M2 | WEIGHT: 226 LBS | HEIGHT: 68 IN

## 2019-05-28 LAB
A1 MICROGLOB PLACENTAL VAG QL: NEGATIVE
APPEARANCE UR: CLEAR
COLOR UR AUTO: YELLOW
CRYSTALS AMN MICRO: NORMAL
GLUCOSE UR QL STRIP.AUTO: NEGATIVE MG/DL
KETONES UR QL STRIP.AUTO: NEGATIVE MG/DL
LEUKOCYTE ESTERASE UR QL STRIP.AUTO: NEGATIVE
NITRITE UR QL STRIP.AUTO: NEGATIVE
PH UR STRIP.AUTO: 6 [PH]
PROT UR QL STRIP: NEGATIVE MG/DL
RBC UR QL AUTO: NEGATIVE
SP GR UR: <=1.005

## 2019-05-28 PROCEDURE — 89060 EXAM SYNOVIAL FLUID CRYSTALS: CPT

## 2019-05-28 PROCEDURE — 81002 URINALYSIS NONAUTO W/O SCOPE: CPT

## 2019-05-28 PROCEDURE — 59025 FETAL NON-STRESS TEST: CPT

## 2019-05-28 PROCEDURE — 84112 EVAL AMNIOTIC FLUID PROTEIN: CPT

## 2019-05-28 NOTE — PROGRESS NOTES
18 y.o.  EDC 19 EGA 36.6 here to LDA4 c/o leaking small amounts of clear fluid with white specs and mucous since last night . Pt reports good fetal movement, denies vaginal bleeding. Reports irregular contractions. VSS. Clean catch urine specimen obtained and dipped, WNL. SSE, no fluid present in vaginal vault. Amnisure and fern collected both negative. FHR low baseline 105-115, moderate variability, accels present. Amnisure and Fern both negative, Reprot to Dr. Julia Clarke and Dr. Walker, RN requested MD's to review FHT tracing r/t low baseline. Strip reviewed by both Vince & Denise. Discharge order received. Pt educated to return to L&D for decreased fetal movement, vaginal bleeding, increased leaking or contractions.  Pt verbalized understanding. Discahrge to home, ambulatory with FOB.

## 2019-05-28 NOTE — DISCHARGE INSTRUCTIONS
General Instructions:  · If you think you are in labor, time contractions (lying on your left side) from the beginning of one contraction to the beginning of the next contraction for at least one hour.  · Increase fluid intake: you should consume 10-12 8 oz glasses of non-caffeinated fluid per day.  · Report any pressure or burning on urination to your physician.  · Monitor fetal movement: If you notice an absence or decrease in fetal movement, drink a large glass of water and rest on your side.  If there is no increase in movement, call your physician or go to the hospital for further evaluation.  · Report any sudden, sharp abdominal pain.  · Report any bleeding.  Spotting or pinkish discharge is normal after vaginal exam.  You may also spot after sexual intercourse.      Other Instructions:Pre-term Labor (<37 weeks):  Call your physician or return to the hospital if:  · You have painless regular contractions more than 4 in one hour.  · Your water breaks (remember time and color).  · You have menstrual-like cramps, a low dull backache or pressure in your pelvis or back.  · Your baby does not move enough to complete the daily kick count (10 movements in 2 hours).  · Your baby moves much less often than on the days before or you have not felt your baby move all day.  · Please review the MEDICATION LIST section of your AFTER VISIT SUMMARY document.  · Take your medication as prescribed    Please carefully review your entire AFTER VISIT SUMMARY document for all discharge instructions.

## 2019-05-30 ENCOUNTER — HOSPITAL ENCOUNTER (INPATIENT)
Facility: MEDICAL CENTER | Age: 19
LOS: 1 days | End: 2019-05-31
Attending: OBSTETRICS & GYNECOLOGY | Admitting: OBSTETRICS & GYNECOLOGY
Payer: COMMERCIAL

## 2019-05-30 LAB
APPEARANCE UR: ABNORMAL
BASOPHILS # BLD AUTO: 0.8 % (ref 0–1.8)
BASOPHILS # BLD: 0.09 K/UL (ref 0–0.12)
COLOR UR AUTO: YELLOW
EOSINOPHIL # BLD AUTO: 0.11 K/UL (ref 0–0.51)
EOSINOPHIL NFR BLD: 1 % (ref 0–6.9)
ERYTHROCYTE [DISTWIDTH] IN BLOOD BY AUTOMATED COUNT: 41.7 FL (ref 35.9–50)
GLUCOSE UR QL STRIP.AUTO: NEGATIVE MG/DL
HCT VFR BLD AUTO: 30.9 % (ref 37–47)
HGB BLD-MCNC: 9.7 G/DL (ref 12–16)
HOLDING TUBE BB 8507: NORMAL
IMM GRANULOCYTES # BLD AUTO: 0.43 K/UL (ref 0–0.11)
IMM GRANULOCYTES NFR BLD AUTO: 3.9 % (ref 0–0.9)
KETONES UR QL STRIP.AUTO: NEGATIVE MG/DL
LEUKOCYTE ESTERASE UR QL STRIP.AUTO: ABNORMAL
LYMPHOCYTES # BLD AUTO: 2.88 K/UL (ref 1–4.8)
LYMPHOCYTES NFR BLD: 26.1 % (ref 22–41)
MCH RBC QN AUTO: 25.4 PG (ref 27–33)
MCHC RBC AUTO-ENTMCNC: 31.4 G/DL (ref 33.6–35)
MCV RBC AUTO: 80.9 FL (ref 81.4–97.8)
MONOCYTES # BLD AUTO: 0.74 K/UL (ref 0–0.85)
MONOCYTES NFR BLD AUTO: 6.7 % (ref 0–13.4)
NEUTROPHILS # BLD AUTO: 6.8 K/UL (ref 2–7.15)
NEUTROPHILS NFR BLD: 61.5 % (ref 44–72)
NITRITE UR QL STRIP.AUTO: NEGATIVE
NRBC # BLD AUTO: 0 K/UL
NRBC BLD-RTO: 0 /100 WBC
PH UR STRIP.AUTO: 7 [PH]
PLATELET # BLD AUTO: 281 K/UL (ref 164–446)
PMV BLD AUTO: 9 FL (ref 9–12.9)
PROT UR QL STRIP: NEGATIVE MG/DL
RBC # BLD AUTO: 3.82 M/UL (ref 4.2–5.4)
RBC UR QL AUTO: NEGATIVE
SP GR UR: 1.01
WBC # BLD AUTO: 11.1 K/UL (ref 4.8–10.8)

## 2019-05-30 PROCEDURE — 700111 HCHG RX REV CODE 636 W/ 250 OVERRIDE (IP)

## 2019-05-30 PROCEDURE — 85025 COMPLETE CBC W/AUTO DIFF WBC: CPT

## 2019-05-30 PROCEDURE — 700102 HCHG RX REV CODE 250 W/ 637 OVERRIDE(OP): Performed by: OBSTETRICS & GYNECOLOGY

## 2019-05-30 PROCEDURE — 700111 HCHG RX REV CODE 636 W/ 250 OVERRIDE (IP): Performed by: OBSTETRICS & GYNECOLOGY

## 2019-05-30 PROCEDURE — A9270 NON-COVERED ITEM OR SERVICE: HCPCS | Performed by: OBSTETRICS & GYNECOLOGY

## 2019-05-30 PROCEDURE — 700105 HCHG RX REV CODE 258: Performed by: STUDENT IN AN ORGANIZED HEALTH CARE EDUCATION/TRAINING PROGRAM

## 2019-05-30 PROCEDURE — 770002 HCHG ROOM/CARE - OB PRIVATE (112)

## 2019-05-30 PROCEDURE — 59409 OBSTETRICAL CARE: CPT

## 2019-05-30 PROCEDURE — 59400 OBSTETRICAL CARE: CPT | Performed by: PHYSICIAN ASSISTANT

## 2019-05-30 PROCEDURE — 81002 URINALYSIS NONAUTO W/O SCOPE: CPT

## 2019-05-30 PROCEDURE — 36415 COLL VENOUS BLD VENIPUNCTURE: CPT

## 2019-05-30 PROCEDURE — 304965 HCHG RECOVERY SERVICES

## 2019-05-30 RX ORDER — MISOPROSTOL 200 UG/1
800 TABLET ORAL
Status: DISCONTINUED | OUTPATIENT
Start: 2019-05-30 | End: 2019-05-30 | Stop reason: HOSPADM

## 2019-05-30 RX ORDER — IBUPROFEN 800 MG/1
800 TABLET ORAL EVERY 8 HOURS PRN
Status: DISCONTINUED | OUTPATIENT
Start: 2019-05-30 | End: 2019-06-01 | Stop reason: HOSPADM

## 2019-05-30 RX ORDER — SODIUM CHLORIDE, SODIUM LACTATE, POTASSIUM CHLORIDE, CALCIUM CHLORIDE 600; 310; 30; 20 MG/100ML; MG/100ML; MG/100ML; MG/100ML
INJECTION, SOLUTION INTRAVENOUS CONTINUOUS
Status: DISCONTINUED | OUTPATIENT
Start: 2019-05-30 | End: 2019-06-01 | Stop reason: HOSPADM

## 2019-05-30 RX ORDER — LIDOCAINE HYDROCHLORIDE 10 MG/ML
INJECTION, SOLUTION INFILTRATION; PERINEURAL
Status: COMPLETED
Start: 2019-05-30 | End: 2019-05-30

## 2019-05-30 RX ORDER — ACETAMINOPHEN 500 MG
1000 TABLET ORAL EVERY 4 HOURS PRN
Status: DISCONTINUED | OUTPATIENT
Start: 2019-05-30 | End: 2019-06-01 | Stop reason: HOSPADM

## 2019-05-30 RX ADMIN — Medication 1 MILLI-UNITS/MIN: at 14:07

## 2019-05-30 RX ADMIN — Medication 2000 ML/HR: at 21:57

## 2019-05-30 RX ADMIN — IBUPROFEN 800 MG: 800 TABLET ORAL at 22:25

## 2019-05-30 RX ADMIN — SODIUM CHLORIDE, POTASSIUM CHLORIDE, SODIUM LACTATE AND CALCIUM CHLORIDE: 600; 310; 30; 20 INJECTION, SOLUTION INTRAVENOUS at 14:07

## 2019-05-30 RX ADMIN — FENTANYL CITRATE 100 MCG: 50 INJECTION INTRAMUSCULAR; INTRAVENOUS at 20:13

## 2019-05-30 ASSESSMENT — PATIENT HEALTH QUESTIONNAIRE - PHQ9
1. LITTLE INTEREST OR PLEASURE IN DOING THINGS: NOT AT ALL
1. LITTLE INTEREST OR PLEASURE IN DOING THINGS: NOT AT ALL
SUM OF ALL RESPONSES TO PHQ9 QUESTIONS 1 AND 2: 0
2. FEELING DOWN, DEPRESSED, IRRITABLE, OR HOPELESS: NOT AT ALL
2. FEELING DOWN, DEPRESSED, IRRITABLE, OR HOPELESS: NOT AT ALL
SUM OF ALL RESPONSES TO PHQ9 QUESTIONS 1 AND 2: 0

## 2019-05-30 ASSESSMENT — LIFESTYLE VARIABLES
ALCOHOL_USE: NO
EVER_SMOKED: YES

## 2019-05-30 NOTE — PROGRESS NOTES
"L&D Progress Note    Name:   Jessie Carney   Date/Time:  5/30/2019 2:26 PM  Gestational Age:  37w1d  Admit Date:   5/30/2019  Admitting Dx:   Pregnancy  Labor and delivery, indication for care  Patient admitted with changing cervical exam , and painful UC's . Patient advance to 4 cm , and then hasn't change in >  2hrs .   Subjective:  Uterine contractions: yes  Pain:    yes  Complaints:   no   Headache: .  no  Blurred vision:  no   SOB:    no   Nausea/vomiting:  no  Epigastric pain:  no  Fetal movement:  normal  Vaginal bleeding: . no    Objective:   Vitals:    05/30/19 0633 05/30/19 1010   BP: 120/72 110/56   Pulse: 99 86   Resp: 16    Temp: 36.6 °C (97.9 °F) 36.7 °C (98 °F)   TempSrc: Temporal Temporal   Weight: 102.5 kg (226 lb)    Height: 1.727 m (5' 8\")      Fetal heart variability: moderate  Uterine contractions: irregular, every 3-5 minutes  Cervical: 40 % / 4/-2 / Vtx / Intact  ;     Fetal position:   Cephalic  Membranes ruptured: no  AROM:  no  Meconium: .  no    IUPC: .   no  FSE .   no    Meds:   Epidural : .  no  Magnesium sulfate: . no  Pitocin: .  no    Labs:  Recent Results (from the past 72 hour(s))   AMNISURE ROM ASSAY    Collection Time: 05/28/19  9:50 AM   Result Value Ref Range    AmniSure ROM Negative Negative   FERN TEST    Collection Time: 05/28/19  9:50 AM   Result Value Ref Range    Fern Test On Amniotic Fluid see below Not present   POC UA    Collection Time: 05/28/19  9:55 AM   Result Value Ref Range    POC Color Yellow     POC Appearance Clear     POC Glucose Negative Negative mg/dL    POC Ketones Negative Negative mg/dL    POC Specific Gravity <=1.005 (A) 1.005 - 1.030    POC Blood Negative Negative    POC Urine PH 6.0 5.0 - 8.0    POC Protein Negative Negative mg/dL    POC Nitrites Negative Negative    POC Leukocyte Esterase Negative Negative   POC UA    Collection Time: 05/30/19  6:43 AM   Result Value Ref Range    POC Color Yellow     POC Appearance Slightly Cloudy (A)     POC " Glucose Negative Negative mg/dL    POC Ketones Negative Negative mg/dL    POC Specific Gravity 1.015 1.005 - 1.030    POC Blood Negative Negative    POC Urine PH 7.0 5.0 - 8.0    POC Protein Negative Negative mg/dL    POC Nitrites Negative Negative    POC Leukocyte Esterase Small (A) Negative   Hold Blood Bank Specimen (Not Tested)    Collection Time: 19  9:15 AM   Result Value Ref Range    Holding Tube - Bb DONE    CBC WITH DIFFERENTIAL    Collection Time: 19  9:15 AM   Result Value Ref Range    WBC 11.1 (H) 4.8 - 10.8 K/uL    RBC 3.82 (L) 4.20 - 5.40 M/uL    Hemoglobin 9.7 (L) 12.0 - 16.0 g/dL    Hematocrit 30.9 (L) 37.0 - 47.0 %    MCV 80.9 (L) 81.4 - 97.8 fL    MCH 25.4 (L) 27.0 - 33.0 pg    MCHC 31.4 (L) 33.6 - 35.0 g/dL    RDW 41.7 35.9 - 50.0 fL    Platelet Count 281 164 - 446 K/uL    MPV 9.0 9.0 - 12.9 fL    Neutrophils-Polys 61.50 44.00 - 72.00 %    Lymphocytes 26.10 22.00 - 41.00 %    Monocytes 6.70 0.00 - 13.40 %    Eosinophils 1.00 0.00 - 6.90 %    Basophils 0.80 0.00 - 1.80 %    Immature Granulocytes 3.90 (H) 0.00 - 0.90 %    Nucleated RBC 0.00 /100 WBC    Neutrophils (Absolute) 6.80 2.00 - 7.15 K/uL    Lymphs (Absolute) 2.88 1.00 - 4.80 K/uL    Monos (Absolute) 0.74 0.00 - 0.85 K/uL    Eos (Absolute) 0.11 0.00 - 0.51 K/uL    Baso (Absolute) 0.09 0.00 - 0.12 K/uL    Immature Granulocytes (abs) 0.43 (H) 0.00 - 0.11 K/uL    NRBC (Absolute) 0.00 K/uL         Assessment:   Gestational Age:   37w1d  Risk Factors:   Anxiety   Labor State:    Prolonged latent labor.  Pregnancy Complications: Need  Echo                                              Plan:                                                     Start Pitocin at this time   Patient Active Problem List    Diagnosis Date Noted   • Abnormal fetal ultrasound - needs fetal echo in PP 2019   • Supervision of other normal pregnancy, antepartum 2019   • History of anxiety and depression 2019   • Penicillin allergy  09/22/2017       Pato Calvillo M.D.

## 2019-05-30 NOTE — PROGRESS NOTES
"0700 Report rcvd from NILES Gonzalez. POC discussed, pt care assumed. Pt found to be lying in bed in no apparent distress. Pt reports only having a \"few mild contractions\" since arriving. SVE 3/70/-2 vertex, intact. PARKER Amor updated. Will ambulate pt and recheck.   0725 Upon returning to room, pt reports having more UCs that \"are not registering on the monitor.\" Pt sent ambulating and encouraged to drink water and juice.   0820 Pt returned from ambulating, SVE 4/80/-2.   0900 Dr. Clarke updated. Will admit to labor. Report to NILES Busby.   "

## 2019-05-30 NOTE — CARE PLAN
Problem: Pain  Goal: Alleviation of Pain or a reduction in pain to the patient's comfort goal  Outcome: PROGRESSING AS EXPECTED  Discussed all pain management options with pt, pt desires IV pain medication as labor progresses.    Problem: Risk for Infection, Impaired Wound Healing  Goal: Remain free from signs and symptoms of infection  Outcome: PROGRESSING AS EXPECTED  Pt remains free from signs/symptoms of infection.

## 2019-05-30 NOTE — PROGRESS NOTES
Pt is a , 37.1 weeks gestation, with c/o uc's since 0000, 2-5 minutes apart. No c/o lof, bleeding or dfm. efm and toco placed, vss. Clean catch ua obtained, wnl.   Report given to NILES Villegas

## 2019-05-30 NOTE — PROGRESS NOTES
0900- Received report from NILES Brown.  POC for pt to walk for one hour.  1020- Pt requesting SVE.  SVE- no change.  1352- Dr. Calvillo and Dr. Clarke at .  SVE- 4/40/-2.  Orders received to start pitocin for augmentation.  1900- Report to NILES Ibarra.

## 2019-05-30 NOTE — H&P
OB H&P:    CC: abdominal pain    HPI:  Ms. Jessie Carney is a 18 y.o.  @ 37w1d by 11 week U/S with painful contractions.     Contractions: Yes , every few minutes  Loss of fluid: No   Vaginal bleeding: No   Fetal movement: present      PNC with TPC starting at 11 weeks    PNL:  Rh+, RI, HIV neg, TrepAb neg, HBsAg NR, GC/CT neg/neg  Glucola: 89  GBS Negative      ROS:  Const: denies fevers, general concerns  CV/resp: reports no concerns  GI: denies abd pain, GI concerns  : see HPI  Neuro: denies HA/vision changes    OB History    Para Term  AB Living   4 1 1   2 1   SAB TAB Ectopic Molar Multiple Live Births   2         1      # Outcome Date GA Lbr Ralf/2nd Weight Sex Delivery Anes PTL Lv   4 Current            3 Term 17 40w1d  3.685 kg (8 lb 2 oz) F Vag-Spont None N HYUN      Birth Comments: IOL. pt states no complications   2 2016     SAB         Birth Comments: Passed on its own   1 2015     SAB         Birth Comments: passed on its own          GYN: denies STIs, no cervical procedures    Past Medical History:   Diagnosis Date   • Asthma     Dx at age 1. not on meds   • Migraine        Past Surgical History:   Procedure Laterality Date   • ANTONIO BY LAPAROSCOPY  8/3/2018    Procedure: ANTONIO BY LAPAROSCOPY;  Surgeon: Germán Watts M.D.;  Location: SURGERY [facility];  Service: General   • CYSTECTOMY  2018    face cyst removal Right side       No current facility-administered medications on file prior to encounter.      Current Outpatient Prescriptions on File Prior to Encounter   Medication Sig Dispense Refill   • Prenatal Vit-Fe Fumarate-FA (PRENATAL VITAMIN) 27-0.8 MG Tab Take  by mouth.         Family History   Problem Relation Age of Onset   • No Known Problems Mother    • No Known Problems Father    • No Known Problems Brother    • Cancer Maternal Grandmother    • No Known Problems Maternal Grandfather    • No Known Problems Paternal Grandmother    • No Known  "Problems Paternal Grandfather        Social History     Social History   • Marital status:      Spouse name: N/A   • Number of children: N/A   • Years of education: N/A     Occupational History   • Not on file.     Social History Main Topics   • Smoking status: Current Some Day Smoker     Packs/day: 0.50     Years: 3.00     Types: Cigarettes   • Smokeless tobacco: Never Used      Comment: Trying to quit, cut back to 3-4 cigarettes/per day   • Alcohol use No   • Drug use: Yes     Types: Marijuana, Inhaled      Comment: a couple weeks ago   • Sexual activity: Yes     Partners: Male      Comment: None     Other Topics Concern   • Not on file     Social History Narrative   • No narrative on file       PE:  Vitals:    19 0633 19 1010   BP: 120/72 110/56   Pulse: 99 86   Resp: 16    Temp: 36.6 °C (97.9 °F) 36.7 °C (98 °F)   TempSrc: Temporal Temporal   Weight: 102.5 kg (226 lb)    Height: 1.727 m (5' 8\")      gen: AAO, NAD  abd: soft, gravid, NT, EFW 3500g  Ext: NT, no edema    SVE: 3/70/-2 at initial exam. After 1 hour of ambulation, cervical exam 4/80/-2. Cervical exam has remained the same after additional ambulation.  FHT: 135/moderate variability/+ accels/ - decels  Giorgi: Contractios Q2-5 minutes on tocometer    A/P: 18 y.o.  @ 37w1d by 11 week U/S in early latent labor.    - Admit to L&D  - Routine labs  - Encourage ambulation in early labor  - Possible augmentation with pitocin and/or amniotomy  - GBS negative, no indication for antibiotics.    Julia Clarke, PGY-1                "

## 2019-05-31 VITALS
OXYGEN SATURATION: 99 % | DIASTOLIC BLOOD PRESSURE: 73 MMHG | SYSTOLIC BLOOD PRESSURE: 121 MMHG | BODY MASS INDEX: 34.25 KG/M2 | WEIGHT: 226 LBS | RESPIRATION RATE: 18 BRPM | TEMPERATURE: 98.4 F | HEIGHT: 68 IN | HEART RATE: 77 BPM

## 2019-05-31 LAB
ERYTHROCYTE [DISTWIDTH] IN BLOOD BY AUTOMATED COUNT: 41.7 FL (ref 35.9–50)
HCT VFR BLD AUTO: 29.8 % (ref 37–47)
HGB BLD-MCNC: 9.5 G/DL (ref 12–16)
MCH RBC QN AUTO: 25.7 PG (ref 27–33)
MCHC RBC AUTO-ENTMCNC: 31.9 G/DL (ref 33.6–35)
MCV RBC AUTO: 80.8 FL (ref 81.4–97.8)
PLATELET # BLD AUTO: 258 K/UL (ref 164–446)
PMV BLD AUTO: 9.4 FL (ref 9–12.9)
RBC # BLD AUTO: 3.69 M/UL (ref 4.2–5.4)
WBC # BLD AUTO: 12.8 K/UL (ref 4.8–10.8)

## 2019-05-31 PROCEDURE — 85027 COMPLETE CBC AUTOMATED: CPT

## 2019-05-31 PROCEDURE — A9270 NON-COVERED ITEM OR SERVICE: HCPCS | Performed by: OBSTETRICS & GYNECOLOGY

## 2019-05-31 PROCEDURE — 10907ZC DRAINAGE OF AMNIOTIC FLUID, THERAPEUTIC FROM PRODUCTS OF CONCEPTION, VIA NATURAL OR ARTIFICIAL OPENING: ICD-10-PCS | Performed by: OBSTETRICS & GYNECOLOGY

## 2019-05-31 PROCEDURE — 700111 HCHG RX REV CODE 636 W/ 250 OVERRIDE (IP): Performed by: OBSTETRICS & GYNECOLOGY

## 2019-05-31 PROCEDURE — 700102 HCHG RX REV CODE 250 W/ 637 OVERRIDE(OP): Performed by: PHYSICIAN ASSISTANT

## 2019-05-31 PROCEDURE — A9270 NON-COVERED ITEM OR SERVICE: HCPCS | Performed by: PHYSICIAN ASSISTANT

## 2019-05-31 PROCEDURE — 36415 COLL VENOUS BLD VENIPUNCTURE: CPT

## 2019-05-31 PROCEDURE — 700102 HCHG RX REV CODE 250 W/ 637 OVERRIDE(OP): Performed by: OBSTETRICS & GYNECOLOGY

## 2019-05-31 RX ORDER — MISOPROSTOL 200 UG/1
600 TABLET ORAL
Status: DISCONTINUED | OUTPATIENT
Start: 2019-05-31 | End: 2019-06-01 | Stop reason: HOSPADM

## 2019-05-31 RX ORDER — SODIUM CHLORIDE, SODIUM LACTATE, POTASSIUM CHLORIDE, CALCIUM CHLORIDE 600; 310; 30; 20 MG/100ML; MG/100ML; MG/100ML; MG/100ML
INJECTION, SOLUTION INTRAVENOUS PRN
Status: DISCONTINUED | OUTPATIENT
Start: 2019-05-31 | End: 2019-06-01 | Stop reason: HOSPADM

## 2019-05-31 RX ORDER — BISACODYL 10 MG
10 SUPPOSITORY, RECTAL RECTAL PRN
Status: DISCONTINUED | OUTPATIENT
Start: 2019-05-31 | End: 2019-06-01 | Stop reason: HOSPADM

## 2019-05-31 RX ORDER — DOCUSATE SODIUM 100 MG/1
100 CAPSULE, LIQUID FILLED ORAL 2 TIMES DAILY PRN
Status: DISCONTINUED | OUTPATIENT
Start: 2019-05-31 | End: 2019-06-01 | Stop reason: HOSPADM

## 2019-05-31 RX ORDER — METHYLERGONOVINE MALEATE 0.2 MG/ML
0.2 INJECTION INTRAVENOUS
Status: DISCONTINUED | OUTPATIENT
Start: 2019-05-31 | End: 2019-06-01 | Stop reason: HOSPADM

## 2019-05-31 RX ORDER — VITAMIN A ACETATE, BETA CAROTENE, ASCORBIC ACID, CHOLECALCIFEROL, .ALPHA.-TOCOPHEROL ACETATE, DL-, THIAMINE MONONITRATE, RIBOFLAVIN, NIACINAMIDE, PYRIDOXINE HYDROCHLORIDE, FOLIC ACID, CYANOCOBALAMIN, CALCIUM CARBONATE, FERROUS FUMARATE, ZINC OXIDE, CUPRIC OXIDE 3080; 12; 120; 400; 1; 1.84; 3; 20; 22; 920; 25; 200; 27; 10; 2 [IU]/1; UG/1; MG/1; [IU]/1; MG/1; MG/1; MG/1; MG/1; MG/1; [IU]/1; MG/1; MG/1; MG/1; MG/1; MG/1
1 TABLET, FILM COATED ORAL EVERY MORNING
Status: DISCONTINUED | OUTPATIENT
Start: 2019-05-31 | End: 2019-06-01 | Stop reason: HOSPADM

## 2019-05-31 RX ORDER — FERROUS SULFATE 325(65) MG
325 TABLET ORAL DAILY
Qty: 30 TAB | Refills: 1 | Status: SHIPPED | OUTPATIENT
Start: 2019-05-31 | End: 2019-08-06 | Stop reason: SDUPTHER

## 2019-05-31 RX ORDER — IBUPROFEN 800 MG/1
800 TABLET ORAL EVERY 8 HOURS PRN
Qty: 30 TAB | Refills: 0 | Status: SHIPPED | OUTPATIENT
Start: 2019-05-31 | End: 2019-07-30

## 2019-05-31 RX ADMIN — Medication 1 TABLET: at 06:29

## 2019-05-31 RX ADMIN — Medication 125 ML/HR: at 00:00

## 2019-05-31 RX ADMIN — IBUPROFEN 800 MG: 800 TABLET ORAL at 06:29

## 2019-05-31 RX ADMIN — ACETAMINOPHEN 1000 MG: 500 TABLET ORAL at 04:40

## 2019-05-31 ASSESSMENT — EDINBURGH POSTNATAL DEPRESSION SCALE (EPDS)
I HAVE BEEN SO UNHAPPY THAT I HAVE BEEN CRYING: NO, NEVER
I HAVE BEEN ANXIOUS OR WORRIED FOR NO GOOD REASON: NO, NOT AT ALL
I HAVE BEEN SO UNHAPPY THAT I HAVE HAD DIFFICULTY SLEEPING: NOT AT ALL
I HAVE FELT SAD OR MISERABLE: NO, NOT AT ALL
THINGS HAVE BEEN GETTING ON TOP OF ME: NO, MOST OF THE TIME I HAVE COPED QUITE WELL
I HAVE FELT SCARED OR PANICKY FOR NO GOOD REASON: NO, NOT MUCH
I HAVE BLAMED MYSELF UNNECESSARILY WHEN THINGS WENT WRONG: YES, SOME OF THE TIME
THE THOUGHT OF HARMING MYSELF HAS OCCURRED TO ME: NEVER
I HAVE BEEN ABLE TO LAUGH AND SEE THE FUNNY SIDE OF THINGS: AS MUCH AS I ALWAYS COULD
I HAVE LOOKED FORWARD WITH ENJOYMENT TO THINGS: AS MUCH AS I EVER DID

## 2019-05-31 NOTE — DISCHARGE PLANNING
Discharge Planning Assessment Post Partum    Reason for Referral: History of depression and anxiety.  Marijuana use two weeks ago.  Address: [address] [place] [id number]  Phone: [phone]  Type of Living Situation: living with FOB and daughter  Mom Diagnosis: Pregnancy  Baby Diagnosis: [place]  Primary Language: English    Name of Baby: Mello Morales (: 19)  Father of the Baby: Ki Morales   Involved in baby’s care? Yes  Contact Information: [phone]    Prenatal Care: Yes  Mom's PCP: None  PCP for new baby: Dr. Bryson    Support System: FOB  Coping/Bonding between mother & baby: Yes  Source of Feeding: breast feeding  Supplies for Infant: prepared for infant    Mom's Insurance: [employer]  Baby Covered on Insurance:Yes  Mother Employed/School: Not currently  Other children in the home/names & ages: 17 month old daughter; Ochoa Morales (17)    Financial Hardship/Income: denies   Mom's Mental status: alert and oriented  Services used prior to admit: Medicaid and WIC    CPS History: denies  Psychiatric History: depression and anxiety.  Provided MOB with counseling resources.  Domestic Violence History: denies  Drug/ETOH History: Yes, admits to marijuana use.  Infant's UDS is negative.    Resources Provided: children and family resource list, counseling resource, contact information to [facility], and bag of  baby supplies  Referrals Made: diaper bank referral     Clearance for Discharge: Infant is cleared to discharge home with MOB.

## 2019-05-31 NOTE — PROGRESS NOTES
"Labor Progress Note:      S:  Pt reports feeling well,  Increasing ctx.    Vitals:    19 0633 19 1010 19 1444 19 1900   BP: 120/72 110/56 114/57 119/68   Pulse: 99 86 76 85   Resp: 16      Temp: 36.6 °C (97.9 °F) 36.7 °C (98 °F) 36.5 °C (97.7 °F) 36.6 °C (97.9 °F)   TempSrc: Temporal Temporal Temporal Temporal   Weight: 102.5 kg (226 lb)      Height: 1.727 m (5' 8\")        Abd: soft, gravid, NT; EFW 3600g    SVE: /-1, AROM clear @ 1920    FHT: 125/mod variability/+ accels/no decels  toco: 2-3min ctx      A/P: 18 y.o.  @ 37w1d by 11wk US admitted for labor  - labor: pt appears to be transitioning towards active labor.  On pitocin for AOL, currently @ 15mUnits/min.  Now s/p AROM.  Cont pit.  Recheck 2hrs, earlier if indicated  - FHT: cat I  - GBS: neg  -  Rh +/-, RI  - hx of anxiety/depression: no meds      Anne Lofton MD  [facility], Women's [facility]                        "

## 2019-05-31 NOTE — DISCHARGE SUMMARY
Discharge Summary:      Jessie Carney      Admit Date:   2019  Discharge Date:  2019     Admitting diagnosis:  Pregnancy  Labor and delivery, indication for care  Discharge Diagnosis: Status post vaginal, spontaneous.  Pregnancy Complications: none  Tubal Ligation:  no        History:  Past Medical History:   Diagnosis Date   • Asthma     Dx at age 1. not on meds   • Migraine      OB History    Para Term  AB Living   4 2 2   2 2   SAB TAB Ectopic Molar Multiple Live Births   2       0 2      # Outcome Date GA Lbr Ralf/2nd Weight Sex Delivery Anes PTL Lv   4 Term 19 37w1d / 00:03 3.625 kg (7 lb 15.9 oz) M Vag-Spont None N HYUN   3 Term 17 40w1d  3.685 kg (8 lb 2 oz) F Vag-Spont None N HYUN      Birth Comments: IOL. pt states no complications   2 2016     SAB         Birth Comments: Passed on its own   1 2015     SAB         Birth Comments: passed on its own           Lanolin; Pcn [penicillins]; and Sulfa drugs  Patient Active Problem List    Diagnosis Date Noted   • Abnormal fetal ultrasound - needs fetal echo in PP 2019   • Supervision of other normal pregnancy, antepartum 2019   • History of anxiety and depression 2019   • Penicillin allergy 2017        Hospital Course:   18 y.o. , now para 2, was admitted with the above mentioned diagnosis, underwent Active Labor, vaginal, spontaneous. Patient postpartum course was unremarkable, with progressive advancement in diet , ambulation and toleration of oral analgesia. Patient without complaints today and desires discharge.      Vitals:    19 1900 19 2347 19 0200 19 0600   BP: 119/68 114/66 111/71 120/69   Pulse: 85 76 80 79   Resp:     Temp: 36.6 °C (97.9 °F) 36.7 °C (98.1 °F) 36.9 °C (98.4 °F) 36.7 °C (98 °F)   TempSrc: Temporal Temporal Temporal Temporal   SpO2:  97% 97% 96%   Weight:       Height:           Current Facility-Administered Medications    Medication Dose   • LR infusion     • miSOPROStol (CYTOTEC) tablet 600 mcg  600 mcg   • PRN oxytocin (PITOCIN) (20 Units/1000 mL) PRN for excessive uterine bleeding - See Admin Instr  125-999 mL/hr   • methylergonovine (METHERGINE) injection 0.2 mg  0.2 mg   • docusate sodium (COLACE) capsule 100 mg  100 mg   • bisacodyl (DULCOLAX) suppository 10 mg  10 mg   • prenatal plus vitamin (STUARTNATAL 1+1) 27-1 MG tablet 1 Tab  1 Tab   • oxytocin (PITOCIN) infusion (for postpartum)   mL/hr   • ibuprofen (MOTRIN) tablet 800 mg  800 mg   • acetaminophen (TYLENOL) tablet 1,000 mg  1,000 mg   • oxytocin (PITOCIN) 20 UNITS/1000ML LR (induction of labor)  0.5-20 nicole-units/min   • lactated ringers infusion     • fentaNYL (SUBLIMAZE) injection 100 mcg  100 mcg       Exam:  Breast Exam: negative  Abdomen: Abdomen soft, non-tender. BS normal. No masses,  No organomegaly  Fundus Non Tender: yes  Incision: none  Perineum: perineum intact  Extremity: extremities, peripheral pulses and reflexes normal     Labs:  Recent Labs      05/30/19   0915  05/31/19   0513   WBC  11.1*  12.8*   RBC  3.82*  3.69*   HEMOGLOBIN  9.7*  9.5*   HEMATOCRIT  30.9*  29.8*   MCV  80.9*  80.8*   MCH  25.4*  25.7*   MCHC  31.4*  31.9*   RDW  41.7  41.7   PLATELETCT  281  258   MPV  9.0  9.4        Activity:   Discharge to home  Pelvic Rest x 6 weeks    Assessment:  normal postpartum course  Discharge Assessment: No areas of skin breakdown/redness; surgical incision intact/healing     Follow up: [facility] in 5 weeks for vaginal ; 1 week for incision check.      Discharge Meds:   Current Outpatient Prescriptions   Medication Sig Dispense Refill   • ibuprofen (MOTRIN) 800 MG Tab Take 1 Tab by mouth every 8 hours as needed (For cramping after delivery; do not give if patient is receiving ketorolac (Toradol)). 30 Tab 0   • ferrous sulfate 325 (65 Fe) MG tablet Take 1 Tab by mouth every day. 30 Tab 1       Jacqueline Roy, P.A.

## 2019-05-31 NOTE — L&D DELIVERY NOTE
DATE OF SERVICE:  2019    ATTENDING PHYSICIAN:  Anne Lofton MD    On 2019 at 21:36, this 18-year-old  4, para 1  female   with an intrauterine pregnancy at 37 weeks and 1/7 days under no anesthesia   delivered a viable male infant with Apgar scores of 8 and 8, weighing 7 pounds   15 ounces or 3625 g.  Patient was admitted in active labor, progressed   naturally, had an artificial rupture of membranes with clear fluid and   progressed to complete, received 1 dose of fentanyl during labor, but no other   medications.  GBS is negative.  Delivery was via normal spontaneous vaginal   delivery to a sterile field in an occiput anterior position.  Tight nuchal   cord x1 was reduced after delivery of the infant.  The infant was placed on   maternal abdomen and bulb suctioned by the waiting RN.  Delayed cord clamping   occurred until the cord stopped pulsing.  The cord was clamped by myself and   cut by the father of the baby.  An intact placenta with a 3-vessel cord   delivered spontaneously at 21:57.  Pitocin was then started wide open in the   IV.  Vagina was explored and a first-degree perineal laceration was noted and   not repaired as patient had excellent hemostasis.  Fundus was found to be   firm.    ESTIMATED BLOOD LOSS:  250 mL.       ____________________________________     DEB SEARS    NANCY / NTS    DD:  2019 22:32:54  DT:  2019 23:00:08    D#:  [id number]  Job#:  [id number]

## 2019-05-31 NOTE — CARE PLAN
Problem: Safety  Goal: Will remain free from injury  Outcome: PROGRESSING AS EXPECTED  Pt family encouraged to report any spills, loose cords. TOCO and EFM are on pt to monitor baby. Pt understands to call for assistance if needing help ambulating to restroom. IV fluids running as ordered. Educated on use of call light. Call light available at bedside.     Problem: Infection  Goal: Will remain free from infection  Outcome: PROGRESSING AS EXPECTED  Pt has not shown any s/s of infection duration of shift. Pt has remained afebrile. Will continue to observe for s/s of infection. Limited cervical checks in place since AROM.

## 2019-05-31 NOTE — LACTATION NOTE
This note was copied from a baby's chart.  Mother called for latch check, baby at breast when LC arrived, deep latch noted, strong suction noted but baby stops sucking frequently, frequent stimulation needed to maintain active sucking, reminded of the importance of providing frequent stimulation to maintain effective suckling for an appropriate feeding length, discussed expected feeding frequency and duration, encouraged [id number] when breastfeeding as well as to help maintain infant temperature, mother is planning to have baby circumcised later today, discussed potential impact on infant's wakefulness for breastfeeding, discussed need to implement pump use/supplementation for excessive weight loss and/or poor breastfeeding, encouraged to call for latch check/assistance as needed.    Mother states she has personal pump for home use if needed, encouraged to pump if baby becomes tired and is not breastfeeding regularly or effectively, supplement guidelines (goldenrod sheet) provided    Mother has WIC, discussed outpatient breastfeeding assistance available at [facility] and encouraged to seek ongoing assistance with breastfeeding from her WIC counselor as needed after discharge

## 2019-05-31 NOTE — PROGRESS NOTES
Assumed patient care. Took report from Pau L&D RN. Assessed patient, VS stable and within defined parameters, fundus firm at U, lochia light rubra. No pain/redness/swelling in calves.  Patient reports pain as 0/10 on pain scale. Oriented patient to the post partum unit including room features, scheduled medications, welcome letter, and the post partum depression scale. Educated patient on room safety and infant safety. Patient understands and verbalizes safe infant sleeping practices. Call light within reach. Will continue to monitor patient's vitals.

## 2019-05-31 NOTE — LACTATION NOTE
Baby being assessed by RN when LC arrived, mother reports breastfeeding is going well, denies pain with latch, baby was 37.1 weeks gestation at delivery, birth weight was 7#15.9oz, discussed LPI policy, discussed potential for inadequate milk transfer during breastfeeding and asked mother to call for latch check with next feeding, mother agrees to call for latch check.

## 2019-05-31 NOTE — PROGRESS NOTES
- Report received from Qi MOSQUEDA RN. POC discussed. Assumed care of pt at this time. Pt lying in bed comfortably and denies any needs/ concerns at this time.   -  of viable male infant. APGARS 8/8. See delivery record.   2315- Pt up to restroom. Steady gait. Voided. Pericare done at this time.   - Pt transferred to PPU via wheelchair with infant in arms. Report given to Shruthi CAGE. POC discussed. Bands verified.

## 2019-05-31 NOTE — CARE PLAN
Problem: Altered physiologic condition related to immediate post-delivery state and potential for bleeding/hemorrhage  Goal: Patient physiologically stable as evidenced by normal lochia, palpable uterine involution and vital signs within normal limits  Outcome: PROGRESSING AS EXPECTED  Patient VS stable and within defined parameters. Fundus firm 1 below U, lochia light rubra at time of assessment.    Problem: Potential for postpartum infection related to presence of episiotomy/vaginal tear and/or uterine contamination  Goal: Patient will be absent from signs and symptoms of infection  Outcome: PROGRESSING AS EXPECTED  Patient is showing no signs or symptoms of infection at time of assessment.

## 2019-05-31 NOTE — CONSULTS
Lactation note:    Initial visit. Discussed breastfeeding the LPI, and what to watch out for during feedings. MOB states infant has been latching, and she denies pain. She reports  her first for 3 months, and she had no problems.  Encouraged MOB to call if infant feedings start to decline, if infant is hard to keep awake for feedings, or not latching longer than 5 minutes.    Discussed with MOB possibility of implementing feeding plan for no latch, or suboptimal latch, or if weight loss exceeds 3% later tonight.     MOB has WIC in [place] recommended she follow up with WIC office for outpatient lactation support.    MOB has no other questions or concerns regarding breastfeeding. Encouraged to call for assistance as needed.

## 2019-06-01 NOTE — DISCHARGE INSTRUCTIONS
POSTPARTUM DISCHARGE INSTRUCTIONS FOR MOM    YOB: 2000   Age: 18 y.o.               Admit Date: 2019     Discharge Date: 2019  Attending Doctor:  Juan Mena M.D.                  Allergies:  Lanolin; Pcn [penicillins]; and Sulfa drugs    Discharged to home by car. Discharged via wheelchair, hospital escort: Yes.  Special equipment needed: Not Applicable  Belongings with: Personal  Be sure to schedule a follow-up appointment with your primary care doctor or any specialists as instructed.     Discharge Plan:   Diet Plan: Discussed  Activity Level: Discussed  Smoking Cessation Offered: Patient Counseled  Confirmed Follow up Appointment: Appointment Scheduled  Confirmed Symptoms Management: Discussed  Medication Reconciliation Updated: Yes  Pneumococcal Vaccine Administered/Refused: Not given - Patient refused pneumococcal vaccine  Influenza Vaccine Indication: Patient Refuses    REASONS TO CALL YOUR OBSTETRICIAN:  1.   Persistent fever or shaking chills (Temperature higher than 100.4)  2.   Heavy bleeding (soaking more than 1 pad per hour); Passing clots  3.   Foul odor from vagina  4.   Mastitis (Breast infection; breast pain, chills, fever, redness)  5.   Urinary pain, burning or frequency  6.   Episiotomy infection  7.   Abdominal incision infection  8.   Severe depression longer than 24 hours    HAND WASHING  · Prior to handling the baby.  · Before breastfeeding or bottle feeding baby.  · After using the bathroom or changing the baby's diaper.    WOUND CARE  Ask your physician for additional care instructions.  In general:    ·  Incision:      · Keep clean and dry.    · Do NOT lift anything heavier than your baby for up to 6 weeks.    · There should not be any opening or pus.      VAGINAL CARE  · Nothing inside vagina for 6 weeks: no sexual intercourse, tampons or douching.  · Bleeding may continue for 2-4 weeks.  Amount may vary.    · Call your physician for heavy bleeding  "which means soaking more than 1 pad per hour    BIRTH CONTROL  · It is possible to become pregnant at any time after delivery and while breastfeeding.  · Plan to discuss a method of birth control with your physician at your follow up visit. visit.    DIET AND ELIMINATION  · Eating more fiber (bran cereal, fruits, and vegetables) and drinking plenty of fluids will help to avoid constipation.  · Urinary frequency after childbirth is normal.    POSTPARTUM BLUES  During the first few days after birth, you may experience a sense of the \"blues\" which may include impatience, irritability or even crying.  These feeling come and go quickly.  However, as many as 1 in 10 women experience emotional symptoms known as postpartum depression.    Postpartum depression:  May start as early as the second or third day after delivery or take several weeks or months to develop.  Symptoms of \"blues\" are present, but are more intense:  Crying spells; loss of appetite; feelings of hopelessness or loss of control; fear of touching the baby; over concern or no concern at all about the baby; little or no concern about your own appearance/caring for yourself; and/or inability to sleep or excessive sleeping.  Contact your physician if you are experiencing any of these symptoms.    Crisis Hotline:  · [place] Crisis Hotline:  1-233-KINYKPI  Or 1-936.409.6650  · [place] Crisis Hotline:  1-270.512.3927  Or 393-499-6047    PREVENTING SHAKEN BABY:  If you are angry or stressed, PUT THE BABY IN THE CRIB, step away, take some deep breaths, and wait until you are calm to care for the baby.  DO NOT SHAKE THE BABY.  You are not alone, call a supporter for help.    · Crisis Call Center 24/7 crisis line 499-370-2684 or 1-828.948.1176  · You can also text them, text \"ANSWER\" to 981117    QUIT SMOKING/TOBACCO USE:  I understand the use of any tobacco products increases my chance of suffering from future heart disease and could cause other illnesses which may " shorten my life. Quitting the use of tobacco products is the single most important thing I can do to improve my health. For further information on smoking / tobacco cessation call a Toll Free Quit Line at 1-703.873.6688 (*[employer] [place]) or 1-871.869.5260 (American Lung Association) or you can access the web based program at www.lungusa.org.    · [place] Tobacco Users Help Line:  (309) 367-2764       Toll Free: 1-724.173.6042  · Quit Tobacco Program [facility] (096)249-9765    DEPRESSION / SUICIDE RISK:  As you are discharged from this [facility], it is important to learn how to keep safe from harming yourself.    Recognize the warning signs:  · Abrupt changes in personality, positive or negative- including increase in energy   · Giving away possessions  · Change in eating patterns- significant weight changes-  positive or negative  · Change in sleeping patterns- unable to sleep or sleeping all the time   · Unwillingness or inability to communicate  · Depression  · Unusual sadness, discouragement and loneliness  · Talk of wanting to die  · Neglect of personal appearance   · Rebelliousness- reckless behavior  · Withdrawal from people/activities they love  · Confusion- inability to concentrate     If you or a loved one observes any of these behaviors or has concerns about self-harm, here's what you can do:  · Talk about it- your feelings and reasons for harming yourself  · Remove any means that you might use to hurt yourself (examples: pills, rope, extension cords, firearm)  · Get professional help from the community (Mental Health, Substance Abuse, psychological counseling)  · Do not be alone:Call your Safe Contact- someone whom you trust who will be there for you.  · Call your local CRISIS HOTLINE 212-2669 or 431-133-5991  · Call your local Children's Mobile Crisis Response Team [place] (498) 997-1624 or www.[employer]  · Call the toll free National Suicide  Prevention Hotlines   · National Suicide Prevention Lifeline 037-922-CRNX (1055)  · National Hope Line Network 800-SUICIDE (912-1307)    DISCHARGE SURVEY:  Thank you for choosing [facility].  We hope we provided you with very good care.  You may be receiving a survey in the mail.  Please fill it out.  Your opinion is valuable to us.    ADDITIONAL EDUCATIONAL MATERIALS GIVEN TO PATIENT:        My signature on this form indicates that:  1.  I have reviewed and understand the above information  2.  My questions regarding this information have been answered to my satisfaction.  3.  I have formulated a plan with my discharge nurse to obtain my prescribed medication for home.

## 2019-07-10 ENCOUNTER — POST PARTUM (OUTPATIENT)
Dept: OBGYN | Facility: CLINIC | Age: 19
End: 2019-07-10
Payer: COMMERCIAL

## 2019-07-10 VITALS — WEIGHT: 198 LBS | SYSTOLIC BLOOD PRESSURE: 108 MMHG | DIASTOLIC BLOOD PRESSURE: 60 MMHG | BODY MASS INDEX: 30.11 KG/M2

## 2019-07-10 PROBLEM — Z34.80 SUPERVISION OF OTHER NORMAL PREGNANCY, ANTEPARTUM: Status: RESOLVED | Noted: 2019-02-19 | Resolved: 2019-07-10

## 2019-07-10 PROBLEM — Z88.0 PENICILLIN ALLERGY: Status: RESOLVED | Noted: 2017-09-22 | Resolved: 2019-07-10

## 2019-07-10 PROBLEM — O28.3 ABNORMAL FETAL ULTRASOUND: Status: RESOLVED | Noted: 2019-03-13 | Resolved: 2019-07-10

## 2019-07-10 PROCEDURE — 0503F POSTPARTUM CARE VISIT: CPT | Performed by: PHYSICIAN ASSISTANT

## 2019-07-10 RX ORDER — METHYLERGONOVINE MALEATE 0.2 MG/1
0.2 TABLET ORAL EVERY 6 HOURS
Qty: 4 TAB | Refills: 0 | Status: SHIPPED | OUTPATIENT
Start: 2019-07-10 | End: 2019-07-11

## 2019-07-10 RX ORDER — IBUPROFEN 600 MG/1
600 TABLET ORAL EVERY 6 HOURS PRN
Qty: 30 TAB | Refills: 0 | Status: SHIPPED | OUTPATIENT
Start: 2019-07-10 | End: 2019-07-30

## 2019-07-10 ASSESSMENT — ENCOUNTER SYMPTOMS
CONSTITUTIONAL NEGATIVE: 1
RESPIRATORY NEGATIVE: 1
CARDIOVASCULAR NEGATIVE: 1
DEPRESSION: 0
PSYCHIATRIC NEGATIVE: 1
EYES NEGATIVE: 1
MUSCULOSKELETAL NEGATIVE: 1
NEUROLOGICAL NEGATIVE: 1
GASTROINTESTINAL NEGATIVE: 1

## 2019-07-10 ASSESSMENT — EDINBURGH POSTNATAL DEPRESSION SCALE (EPDS)
I HAVE BEEN ANXIOUS OR WORRIED FOR NO GOOD REASON: YES, VERY OFTEN
I HAVE FELT SAD OR MISERABLE: NOT VERY OFTEN
I HAVE BLAMED MYSELF UNNECESSARILY WHEN THINGS WENT WRONG: YES, SOME OF THE TIME
I HAVE BEEN ABLE TO LAUGH AND SEE THE FUNNY SIDE OF THINGS: NOT QUITE SO MUCH NOW
I HAVE LOOKED FORWARD WITH ENJOYMENT TO THINGS: RATHER LESS THAN I USED TO
THE THOUGHT OF HARMING MYSELF HAS OCCURRED TO ME: NEVER
I HAVE BEEN SO UNHAPPY THAT I HAVE HAD DIFFICULTY SLEEPING: NOT VERY OFTEN
I HAVE BEEN SO UNHAPPY THAT I HAVE BEEN CRYING: ONLY OCCASIONALLY
TOTAL SCORE: 13
THINGS HAVE BEEN GETTING ON TOP OF ME: NO, MOST OF THE TIME I HAVE COPED QUITE WELL
I HAVE FELT SCARED OR PANICKY FOR NO GOOD REASON: YES, SOMETIMES

## 2019-07-10 NOTE — PROGRESS NOTES
Pt here today for postpartum exam, pt delivered 5/30/19  Currently breast feeding.   BCM: Pt would like Mirena IUD.  Pt states started having heavy bleeding with blood clots Friday 7/5/19   Good ph: [phone]  Chaperone offered and not needed.

## 2019-07-10 NOTE — PROGRESS NOTES
Subjective:      Jessie Carney is a 18 y.o. female who presents with Postpartum visit today. 5wk s/p  at term without complications. Pt has no complaints- denies depression, intercourse, pain or problems with BF. Pt acknowledges she has some anxiety, but feels it is already improving, and pt has hx of depression, notes she has few issues at this time. Pt had some heavy bleeding, stopped 2 wks ago, then it restarted heavily 5 days ago with passage of blood clots and incr bleeding. Pt denies fever, chills or abd tenderness, though some mild discomfort in lower uterus. PAP not done as pt less than 20yo. BCM desired is Mirena IUD - consent signed today, referral/prior auth placed today.    D/w Dr Walker about bleeding - will get TVUS asap and CBC, as pt is already anemic. Pt urged to take iron, which she hasnt done yet. Per Dr Walker, Methergine will likely not help, but rx still given as pt has 9 day trip to [place] planned, leaving  and will return . Bleeding precautions reviewed.             HPI    Review of Systems   Constitutional: Negative.    HENT: Negative.    Eyes: Negative.    Respiratory: Negative.    Cardiovascular: Negative.    Gastrointestinal: Negative.    Genitourinary: Negative.    Musculoskeletal: Negative.    Skin: Negative.    Neurological: Negative.    Endo/Heme/Allergies: Negative.    Psychiatric/Behavioral: Negative.  Negative for depression.   All other systems reviewed and are negative.         Objective:     /60   Wt 89.8 kg (198 lb)   BMI 30.11 kg/m²      Physical Exam   Constitutional: She appears well-developed and well-nourished.   HENT:   Head: Normocephalic and atraumatic.   Eyes: Pupils are equal, round, and reactive to light.   Neck: Normal range of motion. Neck supple. No thyromegaly present.   Cardiovascular: Normal rate, regular rhythm and normal heart sounds.    Pulmonary/Chest: Effort normal and breath sounds normal. No respiratory distress.   Abdominal:  Soft. Bowel sounds are normal. She exhibits no distension. There is no tenderness.   Genitourinary: Vagina normal. Pelvic exam was performed with patient supine. There is no rash or tenderness on the right labia. There is no rash or tenderness on the left labia. Uterus is enlarged (Boggy, partially involuted, cervix Fingertip dilated). Uterus is not deviated and not tender. Cervix exhibits no motion tenderness. Right adnexum displays no mass and no tenderness. Left adnexum displays no mass and no tenderness. No erythema in the vagina. No foreign body in the vagina. No signs of injury around the vagina. No vaginal discharge found.   Neurological: She is alert. She has normal reflexes.   Skin: Skin is warm and dry. No erythema.   Psychiatric: She has a normal mood and affect. Her behavior is normal. Thought content normal.   Vitals reviewed.              Assessment/Plan:     1. Postpartum care following vaginal delivery  - PAP when 21yo  - REFERRAL TO GYNECOLOGY for Mirena IUD  - methylergonovine (METHERGINE) 0.2 MG Tab; Take 1 Tab by mouth every 6 hours for 1 day.  Dispense: 4 Tab; Refill: 0  - ibuprofen (MOTRIN) 600 MG Tab; Take 1 Tab by mouth every 6 hours as needed.  Dispense: 30 Tab; Refill: 0  - US-PELVIC TRANSVAGINAL ONLY; Future    2. Delayed postpartum hemorrhage  - D/w Dr Walker - will get TVUS and CBC, Methergine likely will not help at this time  - Bleeding precautions reviewed  - TVUS for retained POCs  - CBC WITHOUT DIFFERENTIAL; Future

## 2019-07-11 ENCOUNTER — TELEPHONE (OUTPATIENT)
Dept: OBGYN | Facility: CLINIC | Age: 19
End: 2019-07-11

## 2019-07-11 NOTE — TELEPHONE ENCOUNTER
Pt called, was Rx'd Methergine for bleeding and pt states after her visit yesterday she passed what looked like a piece of placenta and bleeding has decreased, pt now just spotting and wants to know if she should still take Methergine. Consulted with midwife Cathy Sofia and recommends patient to take the Methergine and if she starts having heavy vaginal bleeding she needs to go to ER, Pt notified, voiced understanding and will comply. pt will also call radiology tomorrow and will schedule the US.

## 2019-07-30 ENCOUNTER — POST PARTUM (OUTPATIENT)
Dept: OBGYN | Facility: CLINIC | Age: 19
End: 2019-07-30
Payer: COMMERCIAL

## 2019-07-30 VITALS — DIASTOLIC BLOOD PRESSURE: 70 MMHG | SYSTOLIC BLOOD PRESSURE: 104 MMHG

## 2019-07-30 PROCEDURE — 0503F POSTPARTUM CARE VISIT: CPT | Performed by: NURSE PRACTITIONER

## 2019-07-30 ASSESSMENT — EDINBURGH POSTNATAL DEPRESSION SCALE (EPDS)
I HAVE BEEN SO UNHAPPY THAT I HAVE BEEN CRYING: ONLY OCCASIONALLY
I HAVE BEEN ANXIOUS OR WORRIED FOR NO GOOD REASON: YES, VERY OFTEN
TOTAL SCORE: 17
I HAVE FELT SCARED OR PANICKY FOR NO GOOD REASON: YES, SOMETIMES
I HAVE BEEN ABLE TO LAUGH AND SEE THE FUNNY SIDE OF THINGS: NOT QUITE SO MUCH NOW
I HAVE FELT SAD OR MISERABLE: YES, QUITE OFTEN
I HAVE BLAMED MYSELF UNNECESSARILY WHEN THINGS WENT WRONG: YES, SOME OF THE TIME
I HAVE LOOKED FORWARD WITH ENJOYMENT TO THINGS: RATHER LESS THAN I USED TO
THINGS HAVE BEEN GETTING ON TOP OF ME: YES, SOMETIMES I HAVEN'T BEEN COPING AS WELL AS USUAL
I HAVE BEEN SO UNHAPPY THAT I HAVE HAD DIFFICULTY SLEEPING: YES, SOMETIMES
THE THOUGHT OF HARMING MYSELF HAS OCCURRED TO ME: HARDLY EVER
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
## 2019-07-30 NOTE — PROGRESS NOTES
Pt here today for postpartum exam.  Delivery type:5/30/19 Vaginal   Currently :breast feeding   Desired BCM: pt states she is getting the Mirena   LMP: none   Last pap:no pap due to age  GC/CT: 01/19 Negative   Phone # [phone]   Pt states no concerns today

## 2019-08-06 ENCOUNTER — GYNECOLOGY VISIT (OUTPATIENT)
Dept: OBGYN | Facility: CLINIC | Age: 19
End: 2019-08-06
Payer: COMMERCIAL

## 2019-08-06 VITALS
HEIGHT: 69 IN | SYSTOLIC BLOOD PRESSURE: 110 MMHG | DIASTOLIC BLOOD PRESSURE: 60 MMHG | BODY MASS INDEX: 30.07 KG/M2 | WEIGHT: 203 LBS

## 2019-08-06 DIAGNOSIS — Z32.02 PREGNANCY EXAMINATION OR TEST, NEGATIVE RESULT: ICD-10-CM

## 2019-08-06 DIAGNOSIS — Z30.430 ENCOUNTER FOR INSERTION OF MIRENA IUD: ICD-10-CM

## 2019-08-06 LAB
INT CON NEG: NEGATIVE
INT CON POS: POSITIVE
POC URINE PREGNANCY TEST: NEGATIVE

## 2019-08-06 PROCEDURE — 58300 INSERT INTRAUTERINE DEVICE: CPT | Performed by: OBSTETRICS & GYNECOLOGY

## 2019-08-06 PROCEDURE — 81025 URINE PREGNANCY TEST: CPT | Performed by: OBSTETRICS & GYNECOLOGY

## 2019-08-06 NOTE — PROGRESS NOTES
GYN visit for Mirena IUD insertion  LMP: None yet. Delivered  2019  Negative UPT today 2019  WT: 203 lb  BP: 110/60  Good # [phone]

## 2019-08-06 NOTE — PROGRESS NOTES
GYN Visit    Pt here for IUD insertion.  See procedure note for details.    Anne Lofton MD  [facility], Women's Health

## 2019-08-06 NOTE — PROCEDURES
"  IUD Insertion  Date/Time: 2019 4:26 PM  Performed by: Anne Lofton M.D.  Authorized by: Anne Lofton M.D.           CC: desires IUD insertion    HPI:  19 y.o.  presents today for desired IUD insertion.  Pt today desires Mirena IUD.    No period since delivery.  Has had intercourse and use condoms.  Patient is breast-feeding as well as supplementing with formula.  Overall feels well.  Has lactation visit tomorrow      UPT neg    /60 (BP Location: Right arm, Patient Position: Sitting)   Ht 1.74 m (5' 8.5\")   Wt 92.1 kg (203 lb)   Breastfeeding? Yes   BMI 30.42 kg/m²   IUD Insertion:  The bimanual exam is performed the uterus is noted to be 8 weeks in size and is anteverted  A speculum was inserted into the vagina, the cervix was cleansed with Betadine swabs x3  Tenaculum was placed on the anterior lip of the cervix  The IUD was loaded into   The uterus was sounded to a depth of 8cm  The IUD was released into the uterus.    The strings trimmed to approximately 2 cm  Tenaculum was removed from the cervix and hemostasis was noted.  The patient tolerated the procedure well    Lot: [id number]    A/P: 19 y.o.  s/p mirena IUD insertion as above  - prior to insertion, risks of IUD reviewed with pt including risk of insertion including pain, bleeding, infection, uterine perforation (approximately 1000) and possible need for additional surgical procedure for removal, as well as risks of IUD including pregnancy/contraception failure.  Also discussed likely changes to menstrual cycle with mirena IUD including decreased, irregular or absent menses.   All questions answered, consent signed.    - reviewed need for backup contraception for intercourse within 7 days    Plan for f/u as needed for IUD check in 1-2mos, earlier if concerns.      Anne Lofton MD  [facility], Women's Health        "

## 2019-08-07 RX ORDER — FERROUS SULFATE 325(65) MG
TABLET ORAL
Qty: 60 TAB | Refills: 1 | Status: ON HOLD | OUTPATIENT
Start: 2019-08-07 | End: 2023-02-09

## 2019-10-09 ENCOUNTER — GYNECOLOGY VISIT (OUTPATIENT)
Dept: OBGYN | Facility: CLINIC | Age: 19
End: 2019-10-09
Payer: COMMERCIAL

## 2019-10-09 VITALS — SYSTOLIC BLOOD PRESSURE: 104 MMHG | BODY MASS INDEX: 31.47 KG/M2 | DIASTOLIC BLOOD PRESSURE: 64 MMHG | WEIGHT: 210 LBS

## 2019-10-09 DIAGNOSIS — T83.9XXA COMPLICATION OF INTRAUTERINE DEVICE (IUD), UNSPECIFIED COMPLICATION, INITIAL ENCOUNTER (HCC): ICD-10-CM

## 2019-10-09 DIAGNOSIS — N92.0 MENORRHAGIA WITH REGULAR CYCLE: ICD-10-CM

## 2019-10-09 PROCEDURE — 99213 OFFICE O/P EST LOW 20 MIN: CPT | Performed by: OBSTETRICS & GYNECOLOGY

## 2019-10-09 NOTE — PROGRESS NOTES
19 y.o.  female previously seen for : Chief Complaint:  IUD check     Specialty Problems     None      . Patient now here in follow up.   Patient had IUD placed 2019, but didn t follow up to check , described episode of bleeding heavy , and thought she was pregnant . This occuyrred in 2019 , and has resloved   No LMP recorded.      Subjective: Abdominal Pain: negative    Vaginal Bleedingpositive  Menstrual Cycle: normal: negative  Dysmenorrhea:positive:   Dyspareunia:negative  Urinary Symptoms: negative   Vaginal Discharge:{No          Current Outpatient Medications:   •  ferrous sulfate 325 (65 Fe) MG tablet, TAKE 1 TABLET BY MOUTH EVERY DAY (Patient not taking: Reported on 10/9/2019), Disp: 60 Tab, Rfl: 1  •  Prenatal Vit-Fe Fumarate-FA (PRENATAL VITAMIN) 27-0.8 MG Tab, Take  by mouth., Disp: , Rfl:   ROS: no change in ROS since visit of : 2019.  :No results found for this or any previous visit (from the past 336 hour(s)).    Vitals:    10/09/19 1501   BP: 104/64   Weight: 95.3 kg (210 lb)     Past Medical History:   Diagnosis Date   • Asthma     Dx at age 1. not on meds   • Migraine      PGYN: IUD Usage  Social History     Socioeconomic History   • Marital status:      Spouse name: Not on file   • Number of children: Not on file   • Years of education: Not on file   • Highest education level: Not on file   Occupational History   • Not on file   Social Needs   • Financial resource strain: Not on file   • Food insecurity:     Worry: Not on file     Inability: Not on file   • Transportation needs:     Medical: Not on file     Non-medical: Not on file   Tobacco Use   • Smoking status: Current Some Day Smoker     Packs/day: 0.50     Years: 3.00     Pack years: 1.50     Types: Cigarettes   • Smokeless tobacco: Never Used   • Tobacco comment: Trying to quit, cut back to 3-4 cigarettes/per day   Substance and Sexual Activity   • Alcohol use: No   • Drug use: Yes     Types: Marijuana, Inhaled   •  Sexual activity: Yes     Partners: Male     Birth control/protection: Condom   Lifestyle   • Physical activity:     Days per week: Not on file     Minutes per session: Not on file   • Stress: Not on file   Relationships   • Social connections:     Talks on phone: Not on file     Gets together: Not on file     Attends Confucianism service: Not on file     Active member of club or organization: Not on file     Attends meetings of clubs or organizations: Not on file     Relationship status: Not on file   • Intimate partner violence:     Fear of current or ex partner: Not on file     Emotionally abused: Not on file     Physically abused: Not on file     Forced sexual activity: Not on file   Other Topics Concern   • Behavioral problems Not Asked   • Interpersonal relationships Not Asked   • Sad or not enjoying activities Not Asked   • Suicidal thoughts Not Asked   • Poor school performance Not Asked   • Reading difficulties Not Asked   • Speech difficulties Not Asked   • Writing difficulties Not Asked   • Inadequate sleep Not Asked   • Excessive TV viewing Not Asked   • Excessive video game use Not Asked   • Inadequate exercise Not Asked   • Sports related Not Asked   • Poor diet Not Asked   • Family concerns for drug/alcohol abuse Not Asked   • Poor oral hygiene Not Asked   • Bike safety Not Asked   • Family concerns vehicle safety Not Asked   Social History Narrative   • Not on file     Family History   Problem Relation Age of Onset   • No Known Problems Mother    • No Known Problems Father    • No Known Problems Brother    • Cancer Maternal Grandmother    • No Known Problems Maternal Grandfather    • No Known Problems Paternal Grandmother    • No Known Problems Paternal Grandfather      Past Surgical History:   Procedure Laterality Date   • ANTONIO BY LAPAROSCOPY  8/3/2018    Procedure: ANTONIO BY LAPAROSCOPY;  Surgeon: Germán Watts M.D.;  Location: SURGERY [facility];  Service: General   • CYSTECTOMY  07/30/2018     face cyst removal Right side         Exam:   General : Awake, alert and oriented x 3, Cranial nerves II-XII grossly intact, Reflexes symmetrical, Normal gait  Abdominal : no masses, soft, non-distended no rebound or guarding  Pelvic :  external genitalia normal, Bartholin's glands, urethra, Leeds's glands negative, vaginal mucosa normal, cervix clear;  IUD string seen : seeming long , but appears well seated , os closed , canal normal   Pelvic Support system : normal    Lab :   UPT negative       Ass: IUD complication   Menorrhagia     Spent 15 minutes minutes with the patient ; Face to Face, with >50% of this time spent in counseling and coordination of care, surrounding the above mentioned issues as well as:   P. Schedule pelvic U/S to assess for proper seating , then consider trimming strings     Follow up : Return visit in 4 week(s)

## 2019-10-09 NOTE — NON-PROVIDER
Pt here for IUD check. Pt believes she had miscarriage due to how much she bleed around 9/25 and had devere cramping.   Good # [phone]

## 2022-11-08 NOTE — DISCHARGE PLANNING
Birth certificate has been completed.   Bactrim Pregnancy And Lactation Text: This medication is Pregnancy Category D and is known to cause fetal risk.  It is also excreted in breast milk.

## 2022-12-21 ENCOUNTER — HOSPITAL ENCOUNTER (EMERGENCY)
Facility: MEDICAL CENTER | Age: 22
End: 2022-12-21
Attending: EMERGENCY MEDICINE
Payer: COMMERCIAL

## 2022-12-21 VITALS
OXYGEN SATURATION: 98 % | TEMPERATURE: 97.6 F | HEART RATE: 75 BPM | BODY MASS INDEX: 29.97 KG/M2 | WEIGHT: 200 LBS | DIASTOLIC BLOOD PRESSURE: 86 MMHG | SYSTOLIC BLOOD PRESSURE: 119 MMHG | RESPIRATION RATE: 20 BRPM

## 2022-12-21 DIAGNOSIS — R45.851 SUICIDAL IDEATION: ICD-10-CM

## 2022-12-21 LAB
ALBUMIN SERPL BCP-MCNC: 4.1 G/DL (ref 3.2–4.9)
ALBUMIN/GLOB SERPL: 1.6 G/DL
ALP SERPL-CCNC: 88 U/L (ref 30–99)
ALT SERPL-CCNC: 58 U/L (ref 2–50)
AMPHET UR QL SCN: NEGATIVE
ANION GAP SERPL CALC-SCNC: 12 MMOL/L (ref 7–16)
AST SERPL-CCNC: 54 U/L (ref 12–45)
BARBITURATES UR QL SCN: NEGATIVE
BASOPHILS # BLD AUTO: 1 % (ref 0–1.8)
BASOPHILS # BLD: 0.05 K/UL (ref 0–0.12)
BENZODIAZ UR QL SCN: POSITIVE
BILIRUB SERPL-MCNC: 1.2 MG/DL (ref 0.1–1.5)
BUN SERPL-MCNC: 15 MG/DL (ref 8–22)
BZE UR QL SCN: NEGATIVE
CALCIUM ALBUM COR SERPL-MCNC: 9.3 MG/DL (ref 8.5–10.5)
CALCIUM SERPL-MCNC: 9.4 MG/DL (ref 8.5–10.5)
CANNABINOIDS UR QL SCN: POSITIVE
CHLORIDE SERPL-SCNC: 104 MMOL/L (ref 96–112)
CO2 SERPL-SCNC: 22 MMOL/L (ref 20–33)
CREAT SERPL-MCNC: 0.79 MG/DL (ref 0.5–1.4)
EOSINOPHIL # BLD AUTO: 0.01 K/UL (ref 0–0.51)
EOSINOPHIL NFR BLD: 0.2 % (ref 0–6.9)
ERYTHROCYTE [DISTWIDTH] IN BLOOD BY AUTOMATED COUNT: 44.1 FL (ref 35.9–50)
FLUAV RNA SPEC QL NAA+PROBE: NEGATIVE
FLUBV RNA SPEC QL NAA+PROBE: NEGATIVE
GFR SERPLBLD CREATININE-BSD FMLA CKD-EPI: 107 ML/MIN/1.73 M 2
GLOBULIN SER CALC-MCNC: 2.6 G/DL (ref 1.9–3.5)
GLUCOSE SERPL-MCNC: 89 MG/DL (ref 65–99)
HCG SERPL QL: NEGATIVE
HCT VFR BLD AUTO: 44 % (ref 37–47)
HGB BLD-MCNC: 14.2 G/DL (ref 12–16)
IMM GRANULOCYTES # BLD AUTO: 0.01 K/UL (ref 0–0.11)
IMM GRANULOCYTES NFR BLD AUTO: 0.2 % (ref 0–0.9)
LYMPHOCYTES # BLD AUTO: 1.4 K/UL (ref 1–4.8)
LYMPHOCYTES NFR BLD: 29.1 % (ref 22–41)
MCH RBC QN AUTO: 28.5 PG (ref 27–33)
MCHC RBC AUTO-ENTMCNC: 32.3 G/DL (ref 33.6–35)
MCV RBC AUTO: 88.2 FL (ref 81.4–97.8)
METHADONE UR QL SCN: NEGATIVE
MONOCYTES # BLD AUTO: 0.38 K/UL (ref 0–0.85)
MONOCYTES NFR BLD AUTO: 7.9 % (ref 0–13.4)
NEUTROPHILS # BLD AUTO: 2.96 K/UL (ref 2–7.15)
NEUTROPHILS NFR BLD: 61.6 % (ref 44–72)
NRBC # BLD AUTO: 0 K/UL
NRBC BLD-RTO: 0 /100 WBC
OPIATES UR QL SCN: NEGATIVE
OXYCODONE UR QL SCN: NEGATIVE
PCP UR QL SCN: NEGATIVE
PLATELET # BLD AUTO: 277 K/UL (ref 164–446)
PMV BLD AUTO: 9.9 FL (ref 9–12.9)
POC BREATHALIZER: 0 PERCENT (ref 0–0.01)
POTASSIUM SERPL-SCNC: 3.6 MMOL/L (ref 3.6–5.5)
PROPOXYPH UR QL SCN: NEGATIVE
PROT SERPL-MCNC: 6.7 G/DL (ref 6–8.2)
RBC # BLD AUTO: 4.99 M/UL (ref 4.2–5.4)
RSV RNA SPEC QL NAA+PROBE: NEGATIVE
SARS-COV-2 RNA RESP QL NAA+PROBE: NOTDETECTED
SODIUM SERPL-SCNC: 138 MMOL/L (ref 135–145)
SPECIMEN SOURCE: NORMAL
TSH SERPL DL<=0.005 MIU/L-ACNC: 0.57 UIU/ML (ref 0.38–5.33)
VIT B12 SERPL-MCNC: 894 PG/ML (ref 211–911)
WBC # BLD AUTO: 4.8 K/UL (ref 4.8–10.8)

## 2022-12-21 PROCEDURE — 82607 VITAMIN B-12: CPT

## 2022-12-21 PROCEDURE — 96365 THER/PROPH/DIAG IV INF INIT: CPT

## 2022-12-21 PROCEDURE — 36415 COLL VENOUS BLD VENIPUNCTURE: CPT

## 2022-12-21 PROCEDURE — 90791 PSYCH DIAGNOSTIC EVALUATION: CPT

## 2022-12-21 PROCEDURE — 85025 COMPLETE CBC W/AUTO DIFF WBC: CPT

## 2022-12-21 PROCEDURE — A9270 NON-COVERED ITEM OR SERVICE: HCPCS | Performed by: EMERGENCY MEDICINE

## 2022-12-21 PROCEDURE — 80307 DRUG TEST PRSMV CHEM ANLYZR: CPT

## 2022-12-21 PROCEDURE — C9803 HOPD COVID-19 SPEC COLLECT: HCPCS | Performed by: EMERGENCY MEDICINE

## 2022-12-21 PROCEDURE — 80053 COMPREHEN METABOLIC PANEL: CPT

## 2022-12-21 PROCEDURE — 0241U HCHG SARS-COV-2 COVID-19 NFCT DS RESP RNA 4 TRGT MIC: CPT

## 2022-12-21 PROCEDURE — 700101 HCHG RX REV CODE 250: Performed by: EMERGENCY MEDICINE

## 2022-12-21 PROCEDURE — 99285 EMERGENCY DEPT VISIT HI MDM: CPT

## 2022-12-21 PROCEDURE — 96366 THER/PROPH/DIAG IV INF ADDON: CPT

## 2022-12-21 PROCEDURE — 700102 HCHG RX REV CODE 250 W/ 637 OVERRIDE(OP): Performed by: EMERGENCY MEDICINE

## 2022-12-21 PROCEDURE — 84443 ASSAY THYROID STIM HORMONE: CPT

## 2022-12-21 PROCEDURE — 302970 POC BREATHALIZER: Performed by: EMERGENCY MEDICINE

## 2022-12-21 PROCEDURE — 84703 CHORIONIC GONADOTROPIN ASSAY: CPT

## 2022-12-21 RX ORDER — LORAZEPAM 2 MG/1
2 TABLET ORAL ONCE
Status: COMPLETED | OUTPATIENT
Start: 2022-12-21 | End: 2022-12-21

## 2022-12-21 RX ORDER — LORAZEPAM 1 MG/1
1 TABLET ORAL ONCE
Status: COMPLETED | OUTPATIENT
Start: 2022-12-21 | End: 2022-12-21

## 2022-12-21 RX ADMIN — LORAZEPAM 2 MG: 2 TABLET ORAL at 18:23

## 2022-12-21 RX ADMIN — LORAZEPAM 1 MG: 1 TABLET ORAL at 11:25

## 2022-12-21 RX ADMIN — THIAMINE HYDROCHLORIDE: 100 INJECTION, SOLUTION INTRAMUSCULAR; INTRAVENOUS at 12:14

## 2022-12-21 NOTE — ED PROVIDER NOTES
ED Provider Note    CHIEF COMPLAINT  Chief Complaint   Patient presents with    Hallucinations    ETOH Withdrawal     Via [facility] pt is hallucinating, off psych meds, and 2 days sober from ETOH after using for months daily. NO SI/HI a/ox4 bgl 101 by EMS.         LIMITATION TO HISTORY   Select: : None    HPI  Jessie Carney is a 22 y.o. female who presents here for evaluation of hallucinations, and suicidal ideation.  The patient states that over the last couple of days she has been seeing people in her home that are not there, and having thoughts when to harm herself.  Patient had a psychiatry appointment today at 130, but her  brought her in here instead today.  She also admits to stopping alcohol use, and quit 2 days ago.  She has not had any falls or seizures, but does have some mild shaking.  Patient has no chest pain, shortness breath, or abdominal pain.  She has not been taking her psychiatric medications for many months, and states that she recently moved here, and has no psychiatric care here.  She did not attempt anything prior to coming in.    OUTSIDE HISTORIAN(S):  None    EXTERNAL RECORDS REVIEWED  None    REVIEW OF SYSTEMS  See HPI for further details. All other systems are negative.     PAST MEDICAL HISTORY   has a past medical history of Asthma and Migraine.    SOCIAL HISTORY  Social History     Tobacco Use    Smoking status: Former     Packs/day: 0.50     Years: 3.00     Pack years: 1.50     Types: Cigarettes    Smokeless tobacco: Never    Tobacco comments:     Trying to quit, cut back to 3-4 cigarettes/per day   Vaping Use    Vaping Use: Every day   Substance and Sexual Activity    Alcohol use: Yes     Alcohol/week: 3.0 oz     Types: 5 Standard drinks or equivalent per week    Drug use: Yes     Types: Marijuana, Inhaled    Sexual activity: Yes     Partners: Male     Birth control/protection: Condom       SURGICAL HISTORY   has a past surgical history that includes alfredo by laparoscopy  (8/3/2018) and cystectomy (07/30/2018).    FAMILY HISTORY  Family History   Problem Relation Age of Onset    No Known Problems Mother     No Known Problems Father     No Known Problems Brother     Cancer Maternal Grandmother     No Known Problems Maternal Grandfather     No Known Problems Paternal Grandmother     No Known Problems Paternal Grandfather        CURRENT MEDICATIONS  Home Medications    **Home medications have not yet been reviewed for this encounter**         ALLERGIES  Allergies   Allergen Reactions    Lanolin Rash     .    Pcn [Penicillins] Rash     .      Sulfa Drugs Rash     .         PHYSICAL EXAM  VITAL SIGNS: BP (!) 157/88   Pulse (!) 117   Temp 36.8 °C (98.3 °F) (Temporal)   Resp 20   Wt 90.7 kg (200 lb)   SpO2 94%   BMI 29.97 kg/m²    Constitutional: Well developed, well nourished. mild acute distress.  HEENT: Normocephalic, atraumatic. Posterior pharynx clear and moist.  Eyes:  EOMI. Normal sclera.  Neck: Supple, Full range of motion, nontender.  Chest/Pulmonary: clear to ausculation. Symmetrical expansion.   Cardio: Regular rate and rhythm with no murmur.   Abdomen: Soft, nontender. No peritoneal signs. No guarding. No palpable masses.  Back: No CVA tenderness, nontender midline, no step offs.  Musculoskeletal: No deformity, no edema, neurovascular intact.   Neuro: Clear speech, appropriate, cooperative, cranial nerves II-XII grossly intact. No tremor.   Psych: anxious  mood and affect      COURSE & MEDICAL DECISION MAKING  Pertinent Labs & Imaging studies reviewed. (See chart for details)    1:58 PM  Pt has no tremors, no vomiting.   Normal vital signs, answering questions appropriately, no focal neuro deficits.  She has a history of psych issues. I do not believe withdrawal at this time, however we will continue to use ativan as needed.  Legal completed.  I am unaware if the patient has history of hallucinations, she does not provide this information, but states that she has not  "\"taken her meds in a really long time.\"  Again she has normal vital signs, no tremors, no fever, and has not had any alcohol in a few days.    Differential Diagnosis Considered   Results for orders placed or performed during the hospital encounter of 12/21/22   Comp Metabolic Panel   Result Value Ref Range    Sodium 138 135 - 145 mmol/L    Potassium 3.6 3.6 - 5.5 mmol/L    Chloride 104 96 - 112 mmol/L    Co2 22 20 - 33 mmol/L    Anion Gap 12.0 7.0 - 16.0    Glucose 89 65 - 99 mg/dL    Bun 15 8 - 22 mg/dL    Creatinine 0.79 0.50 - 1.40 mg/dL    Calcium 9.4 8.5 - 10.5 mg/dL    AST(SGOT) 54 (H) 12 - 45 U/L    ALT(SGPT) 58 (H) 2 - 50 U/L    Alkaline Phosphatase 88 30 - 99 U/L    Total Bilirubin 1.2 0.1 - 1.5 mg/dL    Albumin 4.1 3.2 - 4.9 g/dL    Total Protein 6.7 6.0 - 8.2 g/dL    Globulin 2.6 1.9 - 3.5 g/dL    A-G Ratio 1.6 g/dL   CORRECTED CALCIUM   Result Value Ref Range    Correct Calcium 9.3 8.5 - 10.5 mg/dL   ESTIMATED GFR   Result Value Ref Range    GFR (CKD-EPI) 107 >60 mL/min/1.73 m 2   BETA-HCG QUALITATIVE SERUM   Result Value Ref Range    Beta-Hcg Qualitative Serum Negative Negative   CBC WITH DIFFERENTIAL   Result Value Ref Range    WBC 4.8 4.8 - 10.8 K/uL    RBC 4.99 4.20 - 5.40 M/uL    Hemoglobin 14.2 12.0 - 16.0 g/dL    Hematocrit 44.0 37.0 - 47.0 %    MCV 88.2 81.4 - 97.8 fL    MCH 28.5 27.0 - 33.0 pg    MCHC 32.3 (L) 33.6 - 35.0 g/dL    RDW 44.1 35.9 - 50.0 fL    Platelet Count 277 164 - 446 K/uL    MPV 9.9 9.0 - 12.9 fL    Neutrophils-Polys 61.60 44.00 - 72.00 %    Lymphocytes 29.10 22.00 - 41.00 %    Monocytes 7.90 0.00 - 13.40 %    Eosinophils 0.20 0.00 - 6.90 %    Basophils 1.00 0.00 - 1.80 %    Immature Granulocytes 0.20 0.00 - 0.90 %    Nucleated RBC 0.00 /100 WBC    Neutrophils (Absolute) 2.96 2.00 - 7.15 K/uL    Lymphs (Absolute) 1.40 1.00 - 4.80 K/uL    Monos (Absolute) 0.38 0.00 - 0.85 K/uL    Eos (Absolute) 0.01 0.00 - 0.51 K/uL    Baso (Absolute) 0.05 0.00 - 0.12 K/uL    Immature " Granulocytes (abs) 0.01 0.00 - 0.11 K/uL    NRBC (Absolute) 0.00 K/uL   VITAMIN B12   Result Value Ref Range    Vitamin B12 -True Cobalamin 894 211 - 911 pg/mL   TSH WITH REFLEX TO FT4   Result Value Ref Range    TSH 0.570 0.380 - 5.330 uIU/mL   COV-2, FLU A/B, AND RSV BY PCR (2-4 HOURS CEPHEID): Collect NP swab in VTM    Specimen: Respirate   Result Value Ref Range    Influenza virus A RNA Negative Negative    Influenza virus B, PCR Negative Negative    RSV, PCR Negative Negative    SARS-CoV-2 by PCR NotDetected     SARS-CoV-2 Source NP Swab    POC BREATHALIZER   Result Value Ref Range    POC Breathalizer 0.000 0.00 - 0.01 Percent     No orders to display     The pt has been seen by psych NP.  She would like some labs and we will transport.     DISCUSSION OF MANAGEMENT WITH OTHER PHYSICIANS, Q OR APPROPRIATE SOURCE  Select: None    INDEPENDENT INTERPRETATION OF STUDIES  Labs    ESCALATION OF CARE  blood analysis    SOCIAL DETERMINANTS THAT SIGNIFICANTLY AFFECT CARE  Select: None    PRESCRIPTION DRUG CONSIDERED  None    DIAGNOSTICS TESTS CONSIDERED  None    ADDITIONAL PROBLEMS ADDRESSED - COPA  In addition to the chief complaint, I have addressed the following problems , patient has been seen evaluated by psych nurse practitioner, who would like some blood work.  They will see about placement after this.    6:08 PM  The pt has been resting comfortably.  No fever/chills. No tremors,  no tachycardia.  She has been given ativan here, and has been seen by psych NP.  She stopped drinking two days ago, and today would be day three.        The patient will not drink alcohol nor drive with prescribed medications. The patient will return for worsening symptoms and is stable at the time of discharge. The patient verbalizes understanding and will comply.    FINAL IMPRESSION  1. Suicidal ideation           Electronically signed by: Ronnell Olivares D.O., 12/21/2022 11:11 AM

## 2022-12-21 NOTE — CONSULTS
[facility]   TRIAGE ASSESSMENT    Name: Jessie Carney  MRN: [id number]  : 2000  Age: 22 y.o.  Date of assessment: 2022  PCP: Pcp Pt States None  Persons in attendance: Patient  Patient Location: [facility]    CHIEF COMPLAINT/PRESENTING ISSUE (as stated by patient): Pt BIB EMS for alcohol withdrawal hallucinations. Pt has been drinking 750ml of alcohol daily since September. Last drink was 2 days ago. Pt states she is hallucinating her upstairs neighbors in her room and hearing voices saying they are going to take her soul. Pt is internally preoccupied and has difficulty participating in the interview. She is endorsing suicidal ideation with a plan to cut her wrists. Past attempts by cutting and overdosing at age 13. She is actively self-harming by cutting and has superficial lacerations to her forearms, last was a couple of days ago. Pt reports past diagnose of MDD, PTSD. Denies past psychotic episodes. She was planning to see a new psychiatrist today. Recommend pt be on a legal hold for suicidal ideation.   Chief Complaint   Patient presents with    Hallucinations    ETOH Withdrawal     Via [facility] pt is hallucinating, off psych meds, and 2 days sober from ETOH after using for months daily. NO SI/HI a/ox4 bgl 101 by EMS.          CURRENT LIVING SITUATION/SOCIAL SUPPORT/FINANCIAL RESOURCES: Lives in an apartment with her  and two children, ages 3 and 5. She is a stay at home mom.     BEHAVIORAL HEALTH/SUBSTANCE USE TREATMENT HISTORY  Does patient/parent report a history of prior behavioral health/substance use treatment for patient?   Yes:    Dates Level of Care Facilty/Provider Diagnosis/Problem Medications   2014 x2  inpatient [facility] Suicide attempt None                                                                      SAFETY ASSESSMENT - SELF  Does patient acknowledge current or past symptoms of dangerousness to self or is previous history  noted? yes  Does parent/significant other report patient has current or past symptoms of dangerousness to self? N\A  Does presenting problem suggest symptoms of dangerousness to self? Yes:     Past Current    Suicidal Thoughts: [x]  [x]    Suicidal Plans: [x]  [x]    Suicidal Intent: []  []    Suicide Attempts: [x]  []    Self-Injury []  []      For any boxes checked above, provide detail: 2 past attempts. Currently endorsing SI with plan, no intent.    History of suicide by family member: no  History of suicide by friend/significant other: no  Recent change in frequency/specificity/intensity of suicidal thoughts or self-harm behavior? yes  Current access to firearms, medications, or other identified means of suicide/self-harm? yes  If yes, willing to restrict access to means of suicide/self-harm? yes  Protective factors present:  Strong family connections and her children    SAFETY ASSESSMENT - OTHERS  Does patient acknowledge current or past symptoms of aggressive behavior or risk to others or is previous history noted? Yes, pt states she fights with her , throwing things and breaking items. No physical aggression  Does parent/significant other report patient has current or past symptoms of aggressive behavior or risk to others?  N\A  Does presenting problem suggest symptoms of dangerousness to others? No    LEGAL HISTORY  Does patient acknowledge history of arrest/custodial/detention or is previous history noted? no    Crisis Safety Plan completed and copy given to patient? N\A    ABUSE/NEGLECT SCREENING  Does patient report feeling “unsafe” in his/her home, or afraid of anyone?  no  Does patient report any history of physical, sexual, or emotional abuse?  no  Does parent or significant other report any of the above? N\A  Is there evidence of neglect by self?  no  Is there evidence of neglect by a caregiver? no  Does the patient/parent report any history of CPS/APS/police involvement related to suspected  "abuse/neglect or domestic violence? no  Based on the information provided during the current assessment, is a mandated report of suspected abuse/neglect being made?  No    SUBSTANCE USE SCREENING  Yes:  Will all substances used in the past 30 days:      Last Use Amount   [x]   Alcohol 12/19/22 750ml   []   Marijuana     []   Heroin     []   Prescription Opioids  (used without prescription, for    recreation, or in excess of prescribed amount)     []   Other Prescription  (used without prescription, for    recreation, or in excess of prescribed amount)     []   Cocaine      []   Methamphetamine     []   \"\" drugs (ectasy, MDMA)     []   Other substances        UDS results: pending  Breathalyzer results: not done    What consequences does the patient associate with any of the above substance use and or addictive behaviors? Relationship problems, Family problems    Risk factors for detox (check all that apply):  [x]  Seizures   [x]  Diaphoretic (sweating)   [x]  Tremors   [x]  Hallucinations   []  Increased blood pressure   []  Decreased blood pressure   []  Other   []  None      [x] Patient education on risk factors for detoxification and instructed to return to ER as needed.      MENTAL STATUS   Participation: Active verbal participation  Grooming: Casual  Orientation: Alert and Evidence of hallucinations present  Behavior: Calm  Eye contact: Indirect  Mood: Anxious  Affect: Sad  Thought process: Logical and Goal-directed  Thought content: Within normal limits  Speech: Loud, Hypotalkative, and Latency of response  Perception: Evidence of auditory hallucination and Evidence of hallucination  Memory:  No gross evidence of memory deficits  Insight: Good  Judgment:  Limited  Other:    Collateral information:    Source:  [] Significant other present in person:   [] Significant other by telephone  [] [facility]   [] [facility] Nursing Staff  [x] [facility]  [] Other:     [] Unable to complete full " assessment due to:  [] Acute intoxication  [] Patient declined to participate/engage  [] Patient verbally unresponsive  [] Significant cognitive deficits  [] Significant perceptual distortions or behavioral disorganization  [] Other:      CLINICAL IMPRESSIONS:  Primary:  alcohol use disorder, acute withdrawl  Secondary:  History of MDD, PTSD       IDENTIFIED NEEDS/PLAN:  [Trigger DISPOSITION list for any items marked]    [x]  Imminent safety risk - self [] Imminent safety risk - others   [x]  Acute substance withdrawal [x]  Psychosis/Impaired reality testing   [x]  Mood/anxiety [x]  Substance use/Addictive behavior   [x]  Maladaptive behaviro []  Parent/child conflict   [x]  Family/Couples conflict []  Biomedical   []  Housing []  Financial   []   Legal  Occupational/Educational   []  Domestic violence []  Other:     Recommended Plan of Care:  Actively being addressed by Legal Hold, [facility], and Pt is being admitted to the hospital for medically monitored detox  and 1:1 Observation  *Telesitter may not be utilized for moderate or high risk patients    Has the Recommended Plan of Care/Level of Observation been reviewed with the patient's assigned nurse? yes    Does patient/parent or guardian express agreement with the above plan? yes     Referral appointment(s) scheduled? no    Alert team only:   I have discussed findings and recommendations with Dr. Olivares who is in agreement with these recommendations.     Referral information sent to the following outpatient community providers :    Referral information sent to the following inpatient community providers :    If applicable : Referred  to  Alert Team for legal hold follow up at (time): 12/24/22      Aaliyah Koenig R.N.  12/21/2022

## 2022-12-21 NOTE — ED NOTES
Break RN  Pt moved over to [address]. Report handed off to Nadira CAGE. Pt high risk on suicide scale. Sitter outside the room watching pt 1:1.

## 2022-12-21 NOTE — ED NOTES
Report received from NILES Becerra.  Assumed patient care. Verified patient identification. Checked on bed, connected to monitor,  with unlabored respirations. Vital signs is stable. Denied any new complaints. Gurney in low position, side rail up for pt safety. Call light within reach. Will continue to monitor for any complications.    Patient has SI on legal hold  With 1:1 sitter at bedside

## 2022-12-21 NOTE — DISCHARGE PLANNING
[facility] ALERT TEAM DISCHARGE PLANNING NOTE    Date:  12/21/22  Patient Name:  Jessie Carney - 22 y.o. - Discharge Planning  MRN:  [id number]   YOB: 2000  ADMISSION DATE:  12/21/2022      Writer forwarded referral packet for inpatient psychiatric care to the following community providers:  [facility], [facility]    Items included in the referral packet:   __x___Face Sheet   __x___Pages 1 and 2 of completed legal hold   __x___Alert Team/Psych Assessment   _____H&P   _____UDS   __x___Blood Alcohol   __x___Vital signs   __x___Pregnancy Test (if applicable)   __x___Medications List   _____Covid Screen

## 2022-12-21 NOTE — ED NOTES
Checked on bed,with unlabored respirations.  Denied any new complaints. Gurney in low position, side rail up for pt safety.   Will continue to monitor for any complications.

## 2022-12-22 NOTE — ED NOTES
Checked on bed,  with unlabored respirations. Denied any new complaints.   Gurney in low position, side rail up for pt safety. Call light within reach.   Will continue to monitor for any complications.  With 1:1 sitter at bedside.

## 2022-12-22 NOTE — ED NOTES
Checked on bed, with 1:1 sitter,  with unlabored respirations. Vital signs is stable. Denied any new complaints.   Gurney in low position, side rail up for pt safety.Will continue to monitor for any complications.

## 2022-12-22 NOTE — DISCHARGE PLANNING
Spoke to Nelly at [facility]. Pt is accepted, facility requests transport for 2200.     Accepting MD is Dr. Mota.    ALIE Courtney authorized [facility] transport for 2200.    [facility] confirmed  at 2200.

## 2022-12-22 NOTE — ED PROVIDER NOTES
ED Provider Note    Patient signed out from Dr. Olivares for continued monitoring while awaiting appropriate disposition.  Please see his note for the initial evaluation and management.  Patient has been accepted at [facility] unit and will be transferred once bed is available.

## 2022-12-22 NOTE — DISCHARGE PLANNING
Alert Team Note:    Contacted [facility], spoke to Rody. Pt has been accepted PENDING, infection control form, UDS, rapid Covid test, and CBC.   Informed Alert Team NILES Fischer and NILES Rodrigez.

## 2023-02-08 ENCOUNTER — APPOINTMENT (OUTPATIENT)
Dept: RADIOLOGY | Facility: MEDICAL CENTER | Age: 23
DRG: 897 | End: 2023-02-08
Attending: EMERGENCY MEDICINE
Payer: COMMERCIAL

## 2023-02-08 ENCOUNTER — HOSPITAL ENCOUNTER (INPATIENT)
Facility: MEDICAL CENTER | Age: 23
LOS: 6 days | DRG: 897 | End: 2023-02-15
Attending: EMERGENCY MEDICINE | Admitting: STUDENT IN AN ORGANIZED HEALTH CARE EDUCATION/TRAINING PROGRAM
Payer: COMMERCIAL

## 2023-02-08 DIAGNOSIS — R11.2 INTRACTABLE NAUSEA AND VOMITING: ICD-10-CM

## 2023-02-08 DIAGNOSIS — F10.931 ALCOHOL WITHDRAWAL DELIRIUM, ACUTE, HYPERACTIVE (HCC): ICD-10-CM

## 2023-02-08 DIAGNOSIS — R45.851 SUICIDAL IDEATION: ICD-10-CM

## 2023-02-08 DIAGNOSIS — F29 PSYCHOSIS, UNSPECIFIED PSYCHOSIS TYPE (HCC): ICD-10-CM

## 2023-02-08 DIAGNOSIS — Z72.0 TOBACCO ABUSE: ICD-10-CM

## 2023-02-08 DIAGNOSIS — F10.930 ALCOHOL WITHDRAWAL SYNDROME WITHOUT COMPLICATION (HCC): ICD-10-CM

## 2023-02-08 DIAGNOSIS — S92.301A CLOSED NONDISPLACED FRACTURE OF METATARSAL BONE OF RIGHT FOOT, UNSPECIFIED METATARSAL, INITIAL ENCOUNTER: ICD-10-CM

## 2023-02-08 LAB — POC BREATHALIZER: 0.16 PERCENT (ref 0–0.01)

## 2023-02-08 PROCEDURE — 94760 N-INVAS EAR/PLS OXIMETRY 1: CPT

## 2023-02-08 PROCEDURE — 73610 X-RAY EXAM OF ANKLE: CPT | Mod: RT

## 2023-02-08 PROCEDURE — 99285 EMERGENCY DEPT VISIT HI MDM: CPT

## 2023-02-08 PROCEDURE — 302970 POC BREATHALIZER

## 2023-02-08 PROCEDURE — 73630 X-RAY EXAM OF FOOT: CPT | Mod: RT

## 2023-02-08 PROCEDURE — A9270 NON-COVERED ITEM OR SERVICE: HCPCS | Performed by: EMERGENCY MEDICINE

## 2023-02-08 PROCEDURE — 302970 POC BREATHALIZER: Performed by: EMERGENCY MEDICINE

## 2023-02-08 PROCEDURE — 96372 THER/PROPH/DIAG INJ SC/IM: CPT

## 2023-02-08 PROCEDURE — 700102 HCHG RX REV CODE 250 W/ 637 OVERRIDE(OP): Performed by: EMERGENCY MEDICINE

## 2023-02-08 PROCEDURE — 700111 HCHG RX REV CODE 636 W/ 250 OVERRIDE (IP): Performed by: EMERGENCY MEDICINE

## 2023-02-08 RX ORDER — OLANZAPINE 5 MG/1
10 TABLET, ORALLY DISINTEGRATING ORAL EVERY EVENING
Status: DISCONTINUED | OUTPATIENT
Start: 2023-02-08 | End: 2023-02-12

## 2023-02-08 RX ORDER — HALOPERIDOL 5 MG/ML
5 INJECTION INTRAMUSCULAR ONCE
Status: COMPLETED | OUTPATIENT
Start: 2023-02-08 | End: 2023-02-08

## 2023-02-08 RX ADMIN — HALOPERIDOL LACTATE 5 MG: 5 INJECTION, SOLUTION INTRAMUSCULAR at 23:03

## 2023-02-08 RX ADMIN — OLANZAPINE 10 MG: 5 TABLET, ORALLY DISINTEGRATING ORAL at 22:39

## 2023-02-08 ASSESSMENT — FIBROSIS 4 INDEX: FIB4 SCORE: 0.59

## 2023-02-09 PROBLEM — R11.2 INTRACTABLE NAUSEA AND VOMITING: Status: ACTIVE | Noted: 2023-02-09

## 2023-02-09 PROBLEM — F10.931 ALCOHOL WITHDRAWAL DELIRIUM, ACUTE, HYPERACTIVE (HCC): Status: ACTIVE | Noted: 2023-02-09

## 2023-02-09 PROBLEM — R45.851 SUICIDAL IDEATION: Status: ACTIVE | Noted: 2023-02-09

## 2023-02-09 PROBLEM — S92.301A CLOSED AVULSION FRACTURE OF METATARSAL BONE OF RIGHT FOOT: Status: ACTIVE | Noted: 2023-02-09

## 2023-02-09 PROBLEM — E86.0 DEHYDRATION: Status: ACTIVE | Noted: 2023-02-09

## 2023-02-09 LAB
ANION GAP SERPL CALC-SCNC: 20 MMOL/L (ref 7–16)
BASOPHILS # BLD AUTO: 0.4 % (ref 0–1.8)
BASOPHILS # BLD: 0.03 K/UL (ref 0–0.12)
BUN SERPL-MCNC: 6 MG/DL (ref 8–22)
CALCIUM SERPL-MCNC: 8.8 MG/DL (ref 8.5–10.5)
CHLORIDE SERPL-SCNC: 92 MMOL/L (ref 96–112)
CO2 SERPL-SCNC: 20 MMOL/L (ref 20–33)
CREAT SERPL-MCNC: 0.56 MG/DL (ref 0.5–1.4)
EKG IMPRESSION: NORMAL
EOSINOPHIL # BLD AUTO: 0.01 K/UL (ref 0–0.51)
EOSINOPHIL NFR BLD: 0.1 % (ref 0–6.9)
ERYTHROCYTE [DISTWIDTH] IN BLOOD BY AUTOMATED COUNT: 47.7 FL (ref 35.9–50)
ETHANOL BLD-MCNC: 72.5 MG/DL
GFR SERPLBLD CREATININE-BSD FMLA CKD-EPI: 131 ML/MIN/1.73 M 2
GLUCOSE SERPL-MCNC: 93 MG/DL (ref 65–99)
HCT VFR BLD AUTO: 34.4 % (ref 37–47)
HGB BLD-MCNC: 12 G/DL (ref 12–16)
IMM GRANULOCYTES # BLD AUTO: 0.03 K/UL (ref 0–0.11)
IMM GRANULOCYTES NFR BLD AUTO: 0.4 % (ref 0–0.9)
LYMPHOCYTES # BLD AUTO: 1.33 K/UL (ref 1–4.8)
LYMPHOCYTES NFR BLD: 17.8 % (ref 22–41)
MCH RBC QN AUTO: 28.8 PG (ref 27–33)
MCHC RBC AUTO-ENTMCNC: 34.9 G/DL (ref 33.6–35)
MCV RBC AUTO: 82.5 FL (ref 81.4–97.8)
MONOCYTES # BLD AUTO: 0.35 K/UL (ref 0–0.85)
MONOCYTES NFR BLD AUTO: 4.7 % (ref 0–13.4)
NEUTROPHILS # BLD AUTO: 5.74 K/UL (ref 2–7.15)
NEUTROPHILS NFR BLD: 76.6 % (ref 44–72)
NRBC # BLD AUTO: 0 K/UL
NRBC BLD-RTO: 0 /100 WBC
PLATELET # BLD AUTO: 148 K/UL (ref 164–446)
PMV BLD AUTO: 9.1 FL (ref 9–12.9)
POTASSIUM SERPL-SCNC: 3.6 MMOL/L (ref 3.6–5.5)
RBC # BLD AUTO: 4.17 M/UL (ref 4.2–5.4)
SODIUM SERPL-SCNC: 132 MMOL/L (ref 135–145)
WBC # BLD AUTO: 7.5 K/UL (ref 4.8–10.8)

## 2023-02-09 PROCEDURE — 96365 THER/PROPH/DIAG IV INF INIT: CPT

## 2023-02-09 PROCEDURE — 96375 TX/PRO/DX INJ NEW DRUG ADDON: CPT

## 2023-02-09 PROCEDURE — 96376 TX/PRO/DX INJ SAME DRUG ADON: CPT

## 2023-02-09 PROCEDURE — 700111 HCHG RX REV CODE 636 W/ 250 OVERRIDE (IP): Performed by: INTERNAL MEDICINE

## 2023-02-09 PROCEDURE — 700105 HCHG RX REV CODE 258: Performed by: STUDENT IN AN ORGANIZED HEALTH CARE EDUCATION/TRAINING PROGRAM

## 2023-02-09 PROCEDURE — 99223 1ST HOSP IP/OBS HIGH 75: CPT | Mod: GC | Performed by: PSYCHIATRY & NEUROLOGY

## 2023-02-09 PROCEDURE — 700101 HCHG RX REV CODE 250: Performed by: STUDENT IN AN ORGANIZED HEALTH CARE EDUCATION/TRAINING PROGRAM

## 2023-02-09 PROCEDURE — 700111 HCHG RX REV CODE 636 W/ 250 OVERRIDE (IP): Performed by: STUDENT IN AN ORGANIZED HEALTH CARE EDUCATION/TRAINING PROGRAM

## 2023-02-09 PROCEDURE — 85025 COMPLETE CBC W/AUTO DIFF WBC: CPT

## 2023-02-09 PROCEDURE — 700101 HCHG RX REV CODE 250: Performed by: INTERNAL MEDICINE

## 2023-02-09 PROCEDURE — 80048 BASIC METABOLIC PNL TOTAL CA: CPT

## 2023-02-09 PROCEDURE — A9270 NON-COVERED ITEM OR SERVICE: HCPCS | Performed by: STUDENT IN AN ORGANIZED HEALTH CARE EDUCATION/TRAINING PROGRAM

## 2023-02-09 PROCEDURE — 93010 ELECTROCARDIOGRAM REPORT: CPT | Performed by: INTERNAL MEDICINE

## 2023-02-09 PROCEDURE — 700102 HCHG RX REV CODE 250 W/ 637 OVERRIDE(OP): Performed by: INTERNAL MEDICINE

## 2023-02-09 PROCEDURE — 700102 HCHG RX REV CODE 250 W/ 637 OVERRIDE(OP): Performed by: STUDENT IN AN ORGANIZED HEALTH CARE EDUCATION/TRAINING PROGRAM

## 2023-02-09 PROCEDURE — HZ2ZZZZ DETOXIFICATION SERVICES FOR SUBSTANCE ABUSE TREATMENT: ICD-10-PCS | Performed by: STUDENT IN AN ORGANIZED HEALTH CARE EDUCATION/TRAINING PROGRAM

## 2023-02-09 PROCEDURE — 770000 HCHG ROOM/CARE - INTERMEDIATE ICU *

## 2023-02-09 PROCEDURE — 93005 ELECTROCARDIOGRAM TRACING: CPT | Performed by: STUDENT IN AN ORGANIZED HEALTH CARE EDUCATION/TRAINING PROGRAM

## 2023-02-09 PROCEDURE — 82077 ASSAY SPEC XCP UR&BREATH IA: CPT

## 2023-02-09 PROCEDURE — 99223 1ST HOSP IP/OBS HIGH 75: CPT | Mod: AI | Performed by: STUDENT IN AN ORGANIZED HEALTH CARE EDUCATION/TRAINING PROGRAM

## 2023-02-09 PROCEDURE — A9270 NON-COVERED ITEM OR SERVICE: HCPCS | Performed by: INTERNAL MEDICINE

## 2023-02-09 PROCEDURE — 700111 HCHG RX REV CODE 636 W/ 250 OVERRIDE (IP): Performed by: EMERGENCY MEDICINE

## 2023-02-09 PROCEDURE — 90791 PSYCH DIAGNOSTIC EVALUATION: CPT

## 2023-02-09 PROCEDURE — 36415 COLL VENOUS BLD VENIPUNCTURE: CPT

## 2023-02-09 PROCEDURE — 700101 HCHG RX REV CODE 250

## 2023-02-09 RX ORDER — LORAZEPAM 2 MG/ML
1-2 INJECTION INTRAMUSCULAR
Status: DISCONTINUED | OUTPATIENT
Start: 2023-02-09 | End: 2023-02-15 | Stop reason: HOSPADM

## 2023-02-09 RX ORDER — SODIUM CHLORIDE AND POTASSIUM CHLORIDE 300; 900 MG/100ML; MG/100ML
INJECTION, SOLUTION INTRAVENOUS CONTINUOUS
Status: DISCONTINUED | OUTPATIENT
Start: 2023-02-09 | End: 2023-02-11

## 2023-02-09 RX ORDER — ONDANSETRON 2 MG/ML
4 INJECTION INTRAMUSCULAR; INTRAVENOUS EVERY 4 HOURS PRN
Status: DISCONTINUED | OUTPATIENT
Start: 2023-02-09 | End: 2023-02-15 | Stop reason: HOSPADM

## 2023-02-09 RX ORDER — LORAZEPAM 2 MG/ML
2 INJECTION INTRAMUSCULAR EVERY 4 HOURS
Status: DISCONTINUED | OUTPATIENT
Start: 2023-02-09 | End: 2023-02-13

## 2023-02-09 RX ORDER — LORAZEPAM 2 MG/ML
1.5 INJECTION INTRAMUSCULAR
Status: DISCONTINUED | OUTPATIENT
Start: 2023-02-09 | End: 2023-02-09

## 2023-02-09 RX ORDER — GUAIFENESIN/DEXTROMETHORPHAN 100-10MG/5
10 SYRUP ORAL EVERY 6 HOURS PRN
Status: DISCONTINUED | OUTPATIENT
Start: 2023-02-09 | End: 2023-02-15 | Stop reason: HOSPADM

## 2023-02-09 RX ORDER — FOLIC ACID 1 MG/1
1 TABLET ORAL DAILY
Status: DISPENSED | OUTPATIENT
Start: 2023-02-10 | End: 2023-02-14

## 2023-02-09 RX ORDER — LORAZEPAM 1 MG/1
1 TABLET ORAL EVERY 4 HOURS PRN
Status: DISCONTINUED | OUTPATIENT
Start: 2023-02-09 | End: 2023-02-09

## 2023-02-09 RX ORDER — DIAZEPAM 5 MG/1
10 TABLET ORAL EVERY 6 HOURS
Status: DISCONTINUED | OUTPATIENT
Start: 2023-02-09 | End: 2023-02-09

## 2023-02-09 RX ORDER — PROMETHAZINE HYDROCHLORIDE 25 MG/1
12.5-25 SUPPOSITORY RECTAL EVERY 4 HOURS PRN
Status: DISCONTINUED | OUTPATIENT
Start: 2023-02-09 | End: 2023-02-15 | Stop reason: HOSPADM

## 2023-02-09 RX ORDER — LORAZEPAM 2 MG/ML
0.5 INJECTION INTRAMUSCULAR EVERY 4 HOURS PRN
Status: DISCONTINUED | OUTPATIENT
Start: 2023-02-09 | End: 2023-02-09

## 2023-02-09 RX ORDER — LORAZEPAM 0.5 MG/1
0.5 TABLET ORAL EVERY 4 HOURS PRN
Status: DISCONTINUED | OUTPATIENT
Start: 2023-02-09 | End: 2023-02-09

## 2023-02-09 RX ORDER — METOPROLOL TARTRATE 1 MG/ML
INJECTION, SOLUTION INTRAVENOUS
Status: COMPLETED
Start: 2023-02-09 | End: 2023-02-09

## 2023-02-09 RX ORDER — OXYCODONE HYDROCHLORIDE 5 MG/1
2.5 TABLET ORAL
Status: DISCONTINUED | OUTPATIENT
Start: 2023-02-09 | End: 2023-02-15 | Stop reason: HOSPADM

## 2023-02-09 RX ORDER — DEXMEDETOMIDINE HYDROCHLORIDE 4 UG/ML
.1-1 INJECTION, SOLUTION INTRAVENOUS CONTINUOUS
Status: DISCONTINUED | OUTPATIENT
Start: 2023-02-09 | End: 2023-02-14

## 2023-02-09 RX ORDER — HALOPERIDOL 5 MG/ML
1 INJECTION INTRAMUSCULAR EVERY 4 HOURS PRN
Status: DISCONTINUED | OUTPATIENT
Start: 2023-02-09 | End: 2023-02-15 | Stop reason: HOSPADM

## 2023-02-09 RX ORDER — CHLORDIAZEPOXIDE HYDROCHLORIDE 25 MG/1
25 CAPSULE, GELATIN COATED ORAL EVERY 6 HOURS
Status: DISCONTINUED | OUTPATIENT
Start: 2023-02-10 | End: 2023-02-09

## 2023-02-09 RX ORDER — LORAZEPAM 2 MG/1
4 TABLET ORAL
Status: DISCONTINUED | OUTPATIENT
Start: 2023-02-09 | End: 2023-02-09

## 2023-02-09 RX ORDER — CHLORDIAZEPOXIDE HYDROCHLORIDE 25 MG/1
50 CAPSULE, GELATIN COATED ORAL EVERY 6 HOURS
Status: DISCONTINUED | OUTPATIENT
Start: 2023-02-09 | End: 2023-02-09

## 2023-02-09 RX ORDER — LORAZEPAM 2 MG/1
2 TABLET ORAL
Status: DISCONTINUED | OUTPATIENT
Start: 2023-02-09 | End: 2023-02-09

## 2023-02-09 RX ORDER — DIAZEPAM 5 MG/1
5 TABLET ORAL EVERY 6 HOURS
Status: DISCONTINUED | OUTPATIENT
Start: 2023-02-10 | End: 2023-02-09

## 2023-02-09 RX ORDER — METOPROLOL TARTRATE 1 MG/ML
5 INJECTION, SOLUTION INTRAVENOUS ONCE
Status: COMPLETED | OUTPATIENT
Start: 2023-02-09 | End: 2023-02-09

## 2023-02-09 RX ORDER — LORAZEPAM 2 MG/ML
1 INJECTION INTRAMUSCULAR ONCE
Status: COMPLETED | OUTPATIENT
Start: 2023-02-09 | End: 2023-02-09

## 2023-02-09 RX ORDER — POTASSIUM CHLORIDE 20 MEQ/1
20 TABLET, EXTENDED RELEASE ORAL 2 TIMES DAILY
Status: ON HOLD | COMMUNITY
End: 2023-02-15

## 2023-02-09 RX ORDER — LABETALOL HYDROCHLORIDE 5 MG/ML
10 INJECTION, SOLUTION INTRAVENOUS EVERY 4 HOURS PRN
Status: DISCONTINUED | OUTPATIENT
Start: 2023-02-09 | End: 2023-02-15 | Stop reason: HOSPADM

## 2023-02-09 RX ORDER — BISACODYL 10 MG
10 SUPPOSITORY, RECTAL RECTAL
Status: DISCONTINUED | OUTPATIENT
Start: 2023-02-09 | End: 2023-02-15 | Stop reason: HOSPADM

## 2023-02-09 RX ORDER — PROMETHAZINE HYDROCHLORIDE 25 MG/1
12.5-25 TABLET ORAL EVERY 4 HOURS PRN
Status: DISCONTINUED | OUTPATIENT
Start: 2023-02-09 | End: 2023-02-15 | Stop reason: HOSPADM

## 2023-02-09 RX ORDER — LORAZEPAM 2 MG/ML
1 INJECTION INTRAMUSCULAR
Status: DISCONTINUED | OUTPATIENT
Start: 2023-02-09 | End: 2023-02-09

## 2023-02-09 RX ORDER — LORAZEPAM 2 MG/ML
2 INJECTION INTRAMUSCULAR
Status: DISCONTINUED | OUTPATIENT
Start: 2023-02-09 | End: 2023-02-09

## 2023-02-09 RX ORDER — ESCITALOPRAM OXALATE 10 MG/1
10 TABLET ORAL DAILY
COMMUNITY
End: 2023-05-09

## 2023-02-09 RX ORDER — ONDANSETRON 8 MG/1
8 TABLET, ORALLY DISINTEGRATING ORAL EVERY 8 HOURS PRN
COMMUNITY
End: 2023-05-09

## 2023-02-09 RX ORDER — POLYETHYLENE GLYCOL 3350 17 G/17G
1 POWDER, FOR SOLUTION ORAL
Status: DISCONTINUED | OUTPATIENT
Start: 2023-02-09 | End: 2023-02-15 | Stop reason: HOSPADM

## 2023-02-09 RX ORDER — LAMOTRIGINE 25 MG/1
50 TABLET ORAL
Status: ON HOLD | COMMUNITY
End: 2023-02-15

## 2023-02-09 RX ORDER — AMOXICILLIN 250 MG
2 CAPSULE ORAL 2 TIMES DAILY
Status: DISCONTINUED | OUTPATIENT
Start: 2023-02-09 | End: 2023-02-15 | Stop reason: HOSPADM

## 2023-02-09 RX ORDER — MAGNESIUM SULFATE 1 G/100ML
1 INJECTION INTRAVENOUS ONCE
Status: COMPLETED | OUTPATIENT
Start: 2023-02-09 | End: 2023-02-09

## 2023-02-09 RX ORDER — CLONIDINE HYDROCHLORIDE 0.1 MG/1
0.1 TABLET ORAL
Status: DISCONTINUED | OUTPATIENT
Start: 2023-02-09 | End: 2023-02-15 | Stop reason: HOSPADM

## 2023-02-09 RX ORDER — SODIUM CHLORIDE AND POTASSIUM CHLORIDE 300; 900 MG/100ML; MG/100ML
INJECTION, SOLUTION INTRAVENOUS CONTINUOUS
Status: DISCONTINUED | OUTPATIENT
Start: 2023-02-09 | End: 2023-02-09

## 2023-02-09 RX ORDER — CELECOXIB 200 MG/1
200 CAPSULE ORAL 2 TIMES DAILY
Status: DISCONTINUED | OUTPATIENT
Start: 2023-02-09 | End: 2023-02-09

## 2023-02-09 RX ORDER — ONDANSETRON 4 MG/1
4 TABLET, ORALLY DISINTEGRATING ORAL EVERY 4 HOURS PRN
Status: DISCONTINUED | OUTPATIENT
Start: 2023-02-09 | End: 2023-02-15 | Stop reason: HOSPADM

## 2023-02-09 RX ORDER — ZIPRASIDONE HYDROCHLORIDE 20 MG/1
20 CAPSULE ORAL 2 TIMES DAILY
Status: ON HOLD | COMMUNITY
End: 2023-02-15

## 2023-02-09 RX ORDER — CELECOXIB 200 MG/1
200 CAPSULE ORAL 2 TIMES DAILY PRN
Status: DISCONTINUED | OUTPATIENT
Start: 2023-02-14 | End: 2023-02-09

## 2023-02-09 RX ORDER — GAUZE BANDAGE 2" X 2"
100 BANDAGE TOPICAL DAILY
Status: DISPENSED | OUTPATIENT
Start: 2023-02-10 | End: 2023-02-14

## 2023-02-09 RX ORDER — PROCHLORPERAZINE EDISYLATE 5 MG/ML
5-10 INJECTION INTRAMUSCULAR; INTRAVENOUS EVERY 4 HOURS PRN
Status: DISCONTINUED | OUTPATIENT
Start: 2023-02-09 | End: 2023-02-15 | Stop reason: HOSPADM

## 2023-02-09 RX ORDER — DIAZEPAM 5 MG/ML
10 INJECTION, SOLUTION INTRAMUSCULAR; INTRAVENOUS ONCE
Status: COMPLETED | OUTPATIENT
Start: 2023-02-09 | End: 2023-02-09

## 2023-02-09 RX ORDER — MORPHINE SULFATE 4 MG/ML
2 INJECTION INTRAVENOUS
Status: DISCONTINUED | OUTPATIENT
Start: 2023-02-09 | End: 2023-02-15 | Stop reason: HOSPADM

## 2023-02-09 RX ORDER — HALOPERIDOL 5 MG/ML
2 INJECTION INTRAMUSCULAR ONCE
Status: COMPLETED | OUTPATIENT
Start: 2023-02-09 | End: 2023-02-09

## 2023-02-09 RX ORDER — NICOTINE 21 MG/24HR
21 PATCH, TRANSDERMAL 24 HOURS TRANSDERMAL
Status: DISCONTINUED | OUTPATIENT
Start: 2023-02-09 | End: 2023-02-15 | Stop reason: HOSPADM

## 2023-02-09 RX ORDER — ACETAMINOPHEN 325 MG/1
650 TABLET ORAL EVERY 6 HOURS PRN
Status: DISCONTINUED | OUTPATIENT
Start: 2023-02-09 | End: 2023-02-15 | Stop reason: HOSPADM

## 2023-02-09 RX ORDER — OXYCODONE HYDROCHLORIDE 5 MG/1
5 TABLET ORAL
Status: DISCONTINUED | OUTPATIENT
Start: 2023-02-09 | End: 2023-02-15 | Stop reason: HOSPADM

## 2023-02-09 RX ORDER — HYDROXYZINE PAMOATE 25 MG/1
25 CAPSULE ORAL 3 TIMES DAILY PRN
Status: ON HOLD | COMMUNITY
End: 2023-02-15

## 2023-02-09 RX ADMIN — THIAMINE HYDROCHLORIDE 500 MG: 100 INJECTION, SOLUTION INTRAMUSCULAR; INTRAVENOUS at 13:52

## 2023-02-09 RX ADMIN — LORAZEPAM 1 MG: 2 INJECTION INTRAMUSCULAR; INTRAVENOUS at 04:55

## 2023-02-09 RX ADMIN — DIAZEPAM 10 MG: 5 INJECTION, SOLUTION INTRAMUSCULAR; INTRAVENOUS at 02:32

## 2023-02-09 RX ADMIN — HALOPERIDOL LACTATE 1 MG: 5 INJECTION, SOLUTION INTRAMUSCULAR at 15:29

## 2023-02-09 RX ADMIN — DIAZEPAM 10 MG: 5 TABLET ORAL at 09:22

## 2023-02-09 RX ADMIN — THIAMINE HYDROCHLORIDE 500 MG: 100 INJECTION, SOLUTION INTRAMUSCULAR; INTRAVENOUS at 11:32

## 2023-02-09 RX ADMIN — DIAZEPAM 10 MG: 5 TABLET ORAL at 03:20

## 2023-02-09 RX ADMIN — DIAZEPAM 10 MG: 5 TABLET ORAL at 15:06

## 2023-02-09 RX ADMIN — ONDANSETRON 4 MG: 2 INJECTION INTRAMUSCULAR; INTRAVENOUS at 07:26

## 2023-02-09 RX ADMIN — MORPHINE SULFATE 2 MG: 4 INJECTION, SOLUTION INTRAMUSCULAR; INTRAVENOUS at 09:20

## 2023-02-09 RX ADMIN — LORAZEPAM 2 MG: 2 INJECTION INTRAMUSCULAR; INTRAVENOUS at 17:16

## 2023-02-09 RX ADMIN — LORAZEPAM 2 MG: 2 INJECTION INTRAMUSCULAR; INTRAVENOUS at 17:55

## 2023-02-09 RX ADMIN — DIAZEPAM 10 MG: 5 INJECTION, SOLUTION INTRAMUSCULAR; INTRAVENOUS at 01:18

## 2023-02-09 RX ADMIN — METOPROLOL TARTRATE 5 MG: 1 INJECTION, SOLUTION INTRAVENOUS at 09:49

## 2023-02-09 RX ADMIN — LORAZEPAM 1 MG: 2 INJECTION INTRAMUSCULAR; INTRAVENOUS at 06:15

## 2023-02-09 RX ADMIN — LORAZEPAM 2 MG: 2 INJECTION INTRAMUSCULAR; INTRAVENOUS at 03:20

## 2023-02-09 RX ADMIN — LORAZEPAM 2 MG: 2 INJECTION INTRAMUSCULAR; INTRAVENOUS at 15:57

## 2023-02-09 RX ADMIN — LORAZEPAM 1 MG: 2 INJECTION INTRAMUSCULAR; INTRAVENOUS at 05:29

## 2023-02-09 RX ADMIN — NICOTINE TRANSDERMAL SYSTEM 21 MG: 21 PATCH, EXTENDED RELEASE TRANSDERMAL at 06:35

## 2023-02-09 RX ADMIN — METOPROLOL TARTRATE 25 MG: 25 TABLET, FILM COATED ORAL at 11:48

## 2023-02-09 RX ADMIN — POTASSIUM CHLORIDE AND SODIUM CHLORIDE 200 ML: 900; 300 INJECTION, SOLUTION INTRAVENOUS at 09:38

## 2023-02-09 RX ADMIN — CHLORDIAZEPOXIDE HYDROCHLORIDE 50 MG: 25 CAPSULE ORAL at 16:15

## 2023-02-09 RX ADMIN — MAGNESIUM SULFATE HEPTAHYDRATE 1 G: 1 INJECTION, SOLUTION INTRAVENOUS at 09:38

## 2023-02-09 RX ADMIN — THIAMINE HYDROCHLORIDE: 100 INJECTION, SOLUTION INTRAMUSCULAR; INTRAVENOUS at 03:20

## 2023-02-09 RX ADMIN — LORAZEPAM 2 MG: 2 INJECTION INTRAMUSCULAR; INTRAVENOUS at 17:07

## 2023-02-09 RX ADMIN — DEXMEDETOMIDINE 0.2 MCG/KG/HR: 200 INJECTION, SOLUTION INTRAVENOUS at 18:25

## 2023-02-09 RX ADMIN — HALOPERIDOL LACTATE 2 MG: 5 INJECTION, SOLUTION INTRAMUSCULAR at 16:30

## 2023-02-09 RX ADMIN — LORAZEPAM 1 MG: 2 INJECTION INTRAMUSCULAR; INTRAVENOUS at 14:10

## 2023-02-09 RX ADMIN — LORAZEPAM 1.5 MG: 2 INJECTION INTRAMUSCULAR; INTRAVENOUS at 16:14

## 2023-02-09 RX ADMIN — FAMOTIDINE 20 MG: 10 INJECTION, SOLUTION INTRAVENOUS at 06:40

## 2023-02-09 RX ADMIN — PROMETHAZINE HYDROCHLORIDE 25 MG: 25 TABLET ORAL at 11:49

## 2023-02-09 RX ADMIN — LORAZEPAM 2 MG: 2 INJECTION INTRAMUSCULAR; INTRAVENOUS at 15:25

## 2023-02-09 ASSESSMENT — LIFESTYLE VARIABLES
TREMOR: MODERATE TREMOR WITH ARMS EXTENDED
TOTAL SCORE: 12
PAROXYSMAL SWEATS: BARELY PERCEPTIBLE SWEATING. PALMS MOIST
TOTAL SCORE: MODERATE ITCHING, PINS AND NEEDLES SENSATION, BURNING OR NUMBNESS
AGITATION: SOMEWHAT MORE THAN NORMAL ACTIVITY
TREMOR: *
ORIENTATION AND CLOUDING OF SENSORIUM: CANNOT DO SERIAL ADDITIONS OR IS UNCERTAIN ABOUT DATE
TOTAL SCORE: 41
ANXIETY: MODERATELY ANXIOUS OR GUARDED, SO ANXIETY IS INFERRED
TOTAL SCORE: 27
ANXIETY: MODERATELY ANXIOUS OR GUARDED, SO ANXIETY IS INFERRED
AGITATION: *
NAUSEA AND VOMITING: NO NAUSEA AND NO VOMITING
ANXIETY: *
NAUSEA AND VOMITING: MILD NAUSEA WITH NO VOMITING
AUDITORY DISTURBANCES: MODERATELY SEVERE HALLUCINATIONS
ANXIETY: MODERATELY ANXIOUS OR GUARDED, SO ANXIETY IS INFERRED
AUDITORY DISTURBANCES: MODERATELY SEVERE HALLUCINATIONS
AGITATION: MODERATELY FIDGETY AND RESTLESS
NAUSEA AND VOMITING: *
TOTAL SCORE: MODERATE ITCHING, PINS AND NEEDLES SENSATION, BURNING OR NUMBNESS
NAUSEA AND VOMITING: INTERMITTENT NAUSEA WITH DRY HEAVES
AGITATION: *
VISUAL DISTURBANCES: MILD SENSITIVITY
HEADACHE, FULLNESS IN HEAD: MODERATELY SEVERE
PAROXYSMAL SWEATS: BEADS OF SWEAT OBVIOUS ON FOREHEAD
TREMOR: *
ANXIETY: *
PAROXYSMAL SWEATS: BEADS OF SWEAT OBVIOUS ON FOREHEAD
ANXIETY: EQUIVALENT TO ACUTE PANIC STATES AS OCCUR IN SEVERE DELIRIUM OR ACUTE SCHIZOPHRENIC REACTIONS
HEADACHE, FULLNESS IN HEAD: VERY MILD
TREMOR: MODERATE TREMOR WITH ARMS EXTENDED
ORIENTATION AND CLOUDING OF SENSORIUM: DATE DISORIENTATION BY NO MORE THAN TWO CALENDAR DAYS
AUDITORY DISTURBANCES: MODERATELY SEVERE HALLUCINATIONS
AGITATION: MODERATELY FIDGETY AND RESTLESS
TREMOR: *
AUDITORY DISTURBANCES: NOT PRESENT
NAUSEA AND VOMITING: MILD NAUSEA WITH NO VOMITING
TOTAL SCORE: 32
NAUSEA AND VOMITING: NO NAUSEA AND NO VOMITING
NAUSEA AND VOMITING: MILD NAUSEA WITH NO VOMITING
ORIENTATION AND CLOUDING OF SENSORIUM: DATE DISORIENTATION BY NO MORE THAN TWO CALENDAR DAYS
AGITATION: SOMEWHAT MORE THAN NORMAL ACTIVITY
TOTAL SCORE: 14
TREMOR: *
NAUSEA AND VOMITING: NO NAUSEA AND NO VOMITING
AUDITORY DISTURBANCES: VERY MILD HARSHNESS OR ABILITY TO FRIGHTEN
TOTAL SCORE: MILD ITCHING, PINS AND NEEDLES SENSATION, BURNING OR NUMBNESS
PAROXYSMAL SWEATS: NO SWEAT VISIBLE
VISUAL DISTURBANCES: MODERATELY SEVERE HALLUCINATIONS
AUDITORY DISTURBANCES: MODERATELY SEVERE HALLUCINATIONS
AGITATION: *
NAUSEA AND VOMITING: NO NAUSEA AND NO VOMITING
AGITATION: *
TOTAL SCORE: MODERATE ITCHING, PINS AND NEEDLES SENSATION, BURNING OR NUMBNESS
TOTAL SCORE: 37
VISUAL DISTURBANCES: MODERATELY SEVERE HALLUCINATIONS
NAUSEA AND VOMITING: *
TOTAL SCORE: MODERATE ITCHING, PINS AND NEEDLES SENSATION, BURNING OR NUMBNESS
NAUSEA AND VOMITING: INTERMITTENT NAUSEA WITH DRY HEAVES
TREMOR: *
ORIENTATION AND CLOUDING OF SENSORIUM: DATE DISORIENTATION BY NO MORE THAN TWO CALENDAR DAYS
AGITATION: *
ANXIETY: *
TREMOR: SEVERE TREMOR, EVEN WITH ARMS NOT EXTENDED
NAUSEA AND VOMITING: NO NAUSEA AND NO VOMITING
ORIENTATION AND CLOUDING OF SENSORIUM: DATE DISORIENTATION BY NO MORE THAN TWO CALENDAR DAYS
AUDITORY DISTURBANCES: NOT PRESENT
VISUAL DISTURBANCES: MODERATELY SEVERE HALLUCINATIONS
PAROXYSMAL SWEATS: *
NAUSEA AND VOMITING: *
TOTAL SCORE: MODERATE ITCHING, PINS AND NEEDLES SENSATION, BURNING OR NUMBNESS
TOTAL SCORE: 23
TOTAL SCORE: VERY MILD ITCHING, PINS AND NEEDLES SENSATION, BURNING OR NUMBNESS
AUDITORY DISTURBANCES: MODERATE HARSHNESS OR ABILITY TO FRIGHTEN
PAROXYSMAL SWEATS: BARELY PERCEPTIBLE SWEATING. PALMS MOIST
TOTAL SCORE: MILD ITCHING, PINS AND NEEDLES SENSATION, BURNING OR NUMBNESS
AUDITORY DISTURBANCES: MODERATE HARSHNESS OR ABILITY TO FRIGHTEN
VISUAL DISTURBANCES: VERY MILD SENSITIVITY
AUDITORY DISTURBANCES: MODERATELY SEVERE HALLUCINATIONS
ANXIETY: MODERATELY ANXIOUS OR GUARDED, SO ANXIETY IS INFERRED
VISUAL DISTURBANCES: MODERATELY SEVERE HALLUCINATIONS
PAROXYSMAL SWEATS: *
PAROXYSMAL SWEATS: NO SWEAT VISIBLE
TREMOR: TREMOR NOT VISIBLE BUT CAN BE FELT, FINGERTIP TO FINGERTIP
HEADACHE, FULLNESS IN HEAD: MILD
TOTAL SCORE: 34
PAROXYSMAL SWEATS: BEADS OF SWEAT OBVIOUS ON FOREHEAD
HEADACHE, FULLNESS IN HEAD: NOT PRESENT
TREMOR: *
PAROXYSMAL SWEATS: *
ORIENTATION AND CLOUDING OF SENSORIUM: ORIENTED AND CAN DO SERIAL ADDITIONS
HEADACHE, FULLNESS IN HEAD: MODERATE
HEADACHE, FULLNESS IN HEAD: NOT PRESENT
TOTAL SCORE: MODERATE ITCHING, PINS AND NEEDLES SENSATION, BURNING OR NUMBNESS
TOTAL SCORE: MODERATE ITCHING, PINS AND NEEDLES SENSATION, BURNING OR NUMBNESS
PAROXYSMAL SWEATS: BEADS OF SWEAT OBVIOUS ON FOREHEAD
TOTAL SCORE: 4
HEADACHE, FULLNESS IN HEAD: VERY MILD
VISUAL DISTURBANCES: MODERATELY SEVERE HALLUCINATIONS
PAROXYSMAL SWEATS: *
ORIENTATION AND CLOUDING OF SENSORIUM: ORIENTED AND CAN DO SERIAL ADDITIONS
HEADACHE, FULLNESS IN HEAD: MODERATE
VISUAL DISTURBANCES: MODERATELY SEVERE HALLUCINATIONS
PAROXYSMAL SWEATS: *
VISUAL DISTURBANCES: NOT PRESENT
SUBSTANCE_ABUSE: 1
TOTAL SCORE: 4
ANXIETY: *
HEADACHE, FULLNESS IN HEAD: MILD
TOTAL SCORE: 38
HEADACHE, FULLNESS IN HEAD: NOT PRESENT
ORIENTATION AND CLOUDING OF SENSORIUM: CANNOT DO SERIAL ADDITIONS OR IS UNCERTAIN ABOUT DATE
VISUAL DISTURBANCES: MILD SENSITIVITY
HEADACHE, FULLNESS IN HEAD: MODERATELY SEVERE
TREMOR: *
VISUAL DISTURBANCES: MODERATELY SEVERE HALLUCINATIONS
ORIENTATION AND CLOUDING OF SENSORIUM: CANNOT DO SERIAL ADDITIONS OR IS UNCERTAIN ABOUT DATE
NAUSEA AND VOMITING: INTERMITTENT NAUSEA WITH DRY HEAVES
ANXIETY: *
PAROXYSMAL SWEATS: BEADS OF SWEAT OBVIOUS ON FOREHEAD
VISUAL DISTURBANCES: MILD SENSITIVITY
VISUAL DISTURBANCES: MODERATELY SEVERE HALLUCINATIONS
ANXIETY: MODERATELY ANXIOUS OR GUARDED, SO ANXIETY IS INFERRED
PAROXYSMAL SWEATS: NO SWEAT VISIBLE
TOTAL SCORE: 14
AGITATION: NORMAL ACTIVITY
ANXIETY: MILDLY ANXIOUS
AUDITORY DISTURBANCES: MODERATE HARSHNESS OR ABILITY TO FRIGHTEN
HEADACHE, FULLNESS IN HEAD: MODERATELY SEVERE
ANXIETY: MODERATELY ANXIOUS OR GUARDED, SO ANXIETY IS INFERRED
AUDITORY DISTURBANCES: MODERATE HARSHNESS OR ABILITY TO FRIGHTEN
TREMOR: TREMOR NOT VISIBLE BUT CAN BE FELT, FINGERTIP TO FINGERTIP
TOTAL SCORE: 37
HEADACHE, FULLNESS IN HEAD: NOT PRESENT
TOTAL SCORE: 14
ORIENTATION AND CLOUDING OF SENSORIUM: DATE DISORIENTATION BY NO MORE THAN TWO CALENDAR DAYS
AGITATION: SOMEWHAT MORE THAN NORMAL ACTIVITY
TREMOR: *
AUDITORY DISTURBANCES: MODERATELY SEVERE HALLUCINATIONS
TREMOR: *
TOTAL SCORE: 34
ORIENTATION AND CLOUDING OF SENSORIUM: ORIENTED AND CAN DO SERIAL ADDITIONS
VISUAL DISTURBANCES: MODERATELY SEVERE HALLUCINATIONS
PAROXYSMAL SWEATS: BARELY PERCEPTIBLE SWEATING. PALMS MOIST
AGITATION: MODERATELY FIDGETY AND RESTLESS
VISUAL DISTURBANCES: VERY MILD SENSITIVITY
PAROXYSMAL SWEATS: *
TREMOR: *
HEADACHE, FULLNESS IN HEAD: NOT PRESENT
ORIENTATION AND CLOUDING OF SENSORIUM: CANNOT DO SERIAL ADDITIONS OR IS UNCERTAIN ABOUT DATE
VISUAL DISTURBANCES: MODERATELY SEVERE HALLUCINATIONS
NAUSEA AND VOMITING: INTERMITTENT NAUSEA WITH DRY HEAVES
TOTAL SCORE: MILD ITCHING, PINS AND NEEDLES SENSATION, BURNING OR NUMBNESS
ORIENTATION AND CLOUDING OF SENSORIUM: DATE DISORIENTATION BY NO MORE THAN TWO CALENDAR DAYS
AGITATION: MODERATELY FIDGETY AND RESTLESS
VISUAL DISTURBANCES: MODERATELY SEVERE HALLUCINATIONS
TOTAL SCORE: 34
TREMOR: *
ORIENTATION AND CLOUDING OF SENSORIUM: ORIENTED AND CAN DO SERIAL ADDITIONS
AGITATION: *
AUDITORY DISTURBANCES: MODERATELY SEVERE HALLUCINATIONS
ANXIETY: MODERATELY ANXIOUS OR GUARDED, SO ANXIETY IS INFERRED
ORIENTATION AND CLOUDING OF SENSORIUM: DATE DISORIENTATION BY NO MORE THAN TWO CALENDAR DAYS
ORIENTATION AND CLOUDING OF SENSORIUM: DATE DISORIENTATION BY NO MORE THAN TWO CALENDAR DAYS
ANXIETY: MILDLY ANXIOUS
HEADACHE, FULLNESS IN HEAD: MODERATE
ORIENTATION AND CLOUDING OF SENSORIUM: DATE DISORIENTATION BY NO MORE THAN TWO CALENDAR DAYS
AUDITORY DISTURBANCES: MODERATELY SEVERE HALLUCINATIONS
ANXIETY: *
NAUSEA AND VOMITING: MILD NAUSEA WITH NO VOMITING
NAUSEA AND VOMITING: INTERMITTENT NAUSEA WITH DRY HEAVES
AUDITORY DISTURBANCES: NOT PRESENT
TOTAL SCORE: 33
AGITATION: NORMAL ACTIVITY
TREMOR: *
ANXIETY: MODERATELY ANXIOUS OR GUARDED, SO ANXIETY IS INFERRED
HEADACHE, FULLNESS IN HEAD: VERY MILD
HEADACHE, FULLNESS IN HEAD: NOT PRESENT
AGITATION: *
AGITATION: NORMAL ACTIVITY
AUDITORY DISTURBANCES: MODERATE HARSHNESS OR ABILITY TO FRIGHTEN

## 2023-02-09 ASSESSMENT — COGNITIVE AND FUNCTIONAL STATUS - GENERAL
MOBILITY SCORE: 24
DAILY ACTIVITIY SCORE: 24
SUGGESTED CMS G CODE MODIFIER DAILY ACTIVITY: CH
SUGGESTED CMS G CODE MODIFIER MOBILITY: CH

## 2023-02-09 ASSESSMENT — ENCOUNTER SYMPTOMS
DIZZINESS: 0
HEARTBURN: 1
FEVER: 0
SHORTNESS OF BREATH: 0
DEPRESSION: 1
MYALGIAS: 0
PALPITATIONS: 1
VOMITING: 0
HEADACHES: 0
ABDOMINAL PAIN: 0
CHILLS: 1
HALLUCINATIONS: 1
COUGH: 0
NERVOUS/ANXIOUS: 1
BLURRED VISION: 0
VOMITING: 1
CHILLS: 0
DOUBLE VISION: 0
BRUISES/BLEEDS EASILY: 0
WEAKNESS: 1
NAUSEA: 1
INSOMNIA: 0

## 2023-02-09 ASSESSMENT — PAIN DESCRIPTION - PAIN TYPE: TYPE: ACUTE PAIN

## 2023-02-09 NOTE — ED NOTES
Pt resting in gurney with even and unlabored chest rise and fall, awakes easily to stimuli. 1:1 sitter in place. Care will continue as ordered.

## 2023-02-09 NOTE — ED NOTES
Break RN: PIV placed, labs sent. Valium administered as ordered. pt calm and cooperative, remaining restraints removed by security

## 2023-02-09 NOTE — ASSESSMENT & PLAN NOTE
2/11 stopping Precedex drip but restarted overnight and seroquel initiated.  2/12 am stopped precedex again.  2/13 off precedex and can transfer to medical floor  cessaton of alcohol encouraged  CIWA protocol discontinued   Continue Thiamine and MVI.
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
## 2023-02-09 NOTE — ED TRIAGE NOTES
Chief Complaint   Patient presents with    Legal 2000     BIB [facility] from home after pts  called PD because pt was attempting to crawl out a window and stated she wanted to die.      Pt was reported that her last drink was yesterday. Breathalyzer .106

## 2023-02-09 NOTE — ED PROVIDER NOTES
ER Provider Note    Scribed for Krystian Andrade M.d. by Reinier Roger. 2/8/2023  9:23 PM    Primary Care Provider: Pcp Pt States None    CHIEF COMPLAINT  Chief Complaint   Patient presents with    Legal 2000     BIB MS from home after pts  called PD because pt was attempting to crawl out a window and stated she wanted to die.      LIMITATION TO HISTORY   Select: Altered mental status / Confusion    HPI/ROS  OUTSIDE HISTORIAN(S):  EMS Gave history    EXTERNAL RECORDS REVIEWED  Inpatient records The patient has had multiple admissions for major depressive disorder with psychotic features.     Jessie Carney is a 22 y.o. female who presents to the ED via EMS for evaluation of altered mental status onset prior to arrival. EMS reports that the patient's partner contacted the police because she was attempting to crawl out of a window because she wanted to die. EMS transported the patient to the ED for further evaluation.  Associated symptoms include auditory hallucinations and buttock pain. She denies any suicidal ideation or homicidal ideation. The patient endorses suicidal ideation to nursing, but would not divulge any details about a plan. Jessie endorses alcohol consumption most recently yesterday. Additionally, she told nursing staff that she ran of Geodon approximately 6 days ago.     PAST MEDICAL HISTORY  Past Medical History:   Diagnosis Date    Asthma     Dx at age 1. not on meds    Migraine        SURGICAL HISTORY  Past Surgical History:   Procedure Laterality Date    ANTONIO BY LAPAROSCOPY  8/3/2018    Procedure: ANTONIO BY LAPAROSCOPY;  Surgeon: Germán Wtats M.D.;  Location: SURGERY [facility];  Service: General    CYSTECTOMY  07/30/2018    face cyst removal Right side       FAMILY HISTORY  Family History   Problem Relation Age of Onset    No Known Problems Mother     No Known Problems Father     No Known Problems Brother     Cancer Maternal Grandmother     No Known Problems Maternal  "Grandfather     No Known Problems Paternal Grandmother     No Known Problems Paternal Grandfather        SOCIAL HISTORY   reports that she has quit smoking. Her smoking use included cigarettes. She has a 1.50 pack-year smoking history. She has never used smokeless tobacco. She reports current alcohol use of about 3.0 oz per week. She reports current drug use. Drugs: Marijuana and Inhaled.    CURRENT MEDICATIONS  Previous Medications    FERROUS SULFATE 325 (65 FE) MG TABLET    TAKE 1 TABLET BY MOUTH EVERY DAY    PRENATAL VIT-FE FUMARATE-FA (PRENATAL VITAMIN) 27-0.8 MG TAB    Take  by mouth.       ALLERGIES  Lanolin, Pcn [penicillins], and Sulfa drugs    PHYSICAL EXAM  /70   Pulse 100   Temp 37.1 °C (98.7 °F) (Temporal)   Resp (!) 22   Ht 1.727 m (5' 8\")   Wt 81.6 kg (180 lb)   SpO2 93%   BMI 27.37 kg/m²     Constitutional: Responding to internal stimuli.  HENT: No signs of trauma, Bilateral external ears normal, Nose normal.   Eyes: Pupils are equal and reactive, Conjunctiva normal, Non-icteric.   Neck: Normal range of motion, No tenderness, Supple, No stridor.   Lymphatic: No lymphadenopathy noted.   Cardiovascular: Regular rate and rhythm, no murmurs.   Thorax & Lungs: Normal breath sounds, No respiratory distress, No wheezing, No chest tenderness.   Abdomen: Bowel sounds normal, Soft, No tenderness, No masses, No pulsatile masses. No peritoneal signs.  Skin: Warm, Dry, No erythema, No rash.   Back: No bony tenderness, No CVA tenderness.   Extremities: Intact distal pulses, No edema, No tenderness, No cyanosis.  Musculoskeletal: Good range of motion in all major joints. No tenderness to palpation or major deformities noted.   Neurologic: Alert , Normal motor function, Normal sensory function, No focal deficits noted.   Psychiatric: Spotting to internal stimuli and tremulous.    DIAGNOSTIC STUDIES & PROCEDURES    Labs:   Labs Reviewed   DIAGNOSTIC ALCOHOL - Abnormal; Notable for the following " components:       Result Value    Diagnostic Alcohol 72.5 (*)     All other components within normal limits   CBC WITH DIFFERENTIAL - Abnormal; Notable for the following components:    RBC 4.17 (*)     Hematocrit 34.4 (*)     Platelet Count 148 (*)     Neutrophils-Polys 76.60 (*)     Lymphocytes 17.80 (*)     All other components within normal limits   BASIC METABOLIC PANEL - Abnormal; Notable for the following components:    Sodium 132 (*)     Chloride 92 (*)     Bun 6 (*)     Anion Gap 20.0 (*)     All other components within normal limits   POC BREATHALIZER - Abnormal; Notable for the following components:    POC Breathalizer 0.160 (*)     All other components within normal limits   ESTIMATED GFR   URINE DRUG SCREEN   POC BREATHALIZER       All labs reviewed by me.    Radiology  Preliminary interpretation is as follows this is my independent interpretation.: questionable avulsion fracture to the right foot    DX-FOOT-COMPLETE 3+ RIGHT   Final Result         1.  Small bony fragment at the dorsal base of the metatarsals on lateral view, appearance suggests small ligamentous avulsion fracture fragment, although it is uncertain which metatarsal.      DX-ANKLE 3+ VIEWS RIGHT   Final Result         1.  No acute traumatic bony injury.               COURSE & MEDICAL DECISION MAKING    ED Observation Status? Yes; I am placing the patient in to an observation status due to a diagnostic uncertainty as well as therapeutic intensity. Patient placed in observation status at 9:23 PM, 2/8/2023.     Observation plan is as follows: Pending labs and evaluation from Behavioral Health    Upon Reevaluation, the patient's condition has: not improved; and will be escalated to hospitalization.    Patient discharged from ED Observation status at 2:44 on 2/9/2023.     INITIAL ASSESSMENT AND PLAN  Care Narrative:       9:23 PM - Patient seen and evaluated at bedside. Jessie Carney is a 22 y.o. female who presents with altered mental  status onset prior to arrival. Patient will be treated with Zyprexa TBDP 10 mg for her symptoms. Ordered POC Breathalizer and Urine Drug Screen to evaluate. She understands and agrees to the plan of care. Differential diagnoses include but are not limited to:       Patient has become tachycardic and appears to be in alcohol withdrawal.  Multiple doses of Valium were given.  The patient did have some ecchymosis noted to her right lower extremity and x-rays were performed which shows a small avulsion fracture.    11:31 AM - Ordered POC Breathalizer to further evaluate the patient.     11:56 AM - Nursing staff informed me that after removing the patient's socks, her right ankle is markedly swollen.  Additionally, the patient has removed 2 of her soft restraints. Ordered DX-Foot-Complete 3+ Right, DX-Ankle 3+ Right, and violent restraints. Also ordered Haldol 5 mg to treat the patient's symptoms.     12:43 AM - Ordered Diagnostic Alcohol to further evaluate the patient    12:57  AM - Ordered BMP and CBC with diff to further evaluate the patient. Also ordered Valium 10 mg to treat the patient's symptoms.     1:54 AM - Patient was reevaluated at bedside, she has generalized tremors likely consistent with alcohol withdrawal. Will proceed with a plan for admission.     2:01 AM - Ordered Valium 10  mg to treat the patient.     2:36 AM - Paged Hospitalist    2:44  AM - I discussed the patient's case and the above findings with Dr. Benson (Hospitalist) who will assess the patient for hospitalization.       ADDITIONAL PROBLEM LIST AND DISPOSITION  Patient has low sodium as well as an anion gap of 20.  At this time the patient requires multiple doses of Valium and will need to be admitted to the hospital.    The total critical care time on this patient is 35 minutes, resuscitating patient, speaking with admitting physician, and deciphering test results. This  is exclusive of separately billable procedures.                  DISPOSITION AND DISCUSSIONS  I have discussed management of the patient with the following physicians and RAKESH's: Dr. Benson (Hospitalist    Discussion of management with other [facility] or appropriate source(s): Behavioral Health Will evaluate the patient for a legal hold.           Barriers to care at this time, including but not limited to:  Psychiatric illness. .     Decision tools and prescription drugs considered including, but not limited to:  Doses of benzodiazepines given. .    Patient is unable to care for self and will need to be seen by psychiatry.    DISPOSITION:  Patient will be hospitalized by Dr. Benson in guarded condition.    FINAL IMPRESSION   1. Psychosis, unspecified psychosis type (HCC)    2. Closed nondisplaced fracture of metatarsal bone of right foot, unspecified metatarsal, initial encounter    3. Alcohol withdrawal syndrome without complication (HCC)         Reinier HOGAN (Scribe), am scribing for, and in the presence of, Krystian Andrade M.D..    Electronically signed by: Reinier Roger (Scribe), 2/8/2023    IKrystian M.D. personally performed the services described in this documentation, as scribed by Reinier Roger in my presence, and it is both accurate and complete.    The note accurately reflects work and decisions made by me.  Krystian Andrade M.D.  2/9/2023  3:39 AM

## 2023-02-09 NOTE — PROGRESS NOTES
Pt came up from ED with tremors and tachy CIWA done and ativian given. Pt still tachy 15 min after 1 mg ativian given HR 130s-150s. Admitting Dr. Benson notified of HR additonal dose of ativian ordered 0519. Pt still tachy at 0600 touching 160/170 Dr. Benson notified of HR and more agitation another 1 mg of ativian ordered. Admission questions completed as much as possible as pt was unwilling to answer most of the questions. Day shift RN updated and sitter at bedside for SI and safety.

## 2023-02-09 NOTE — DISCHARGE INSTRUCTIONS
Take medications as directed   It is important that you stop drinking alcohol   Follow up with your psychiatrist and primary care doctor as directed   Use the orthopedic shoe as directed, follow up with Dr. Jasso, call to make an appointment to schedule follow up   If you have any worsening depression, thoughts of harming yourself or other call [phone] or go to the closest ER     Alcohol Intoxication  Alcohol intoxication happens when you cannot think clearly or function well (get impaired) after drinking alcohol. This can happen after just one drink. The effect that alcohol has on how you think and function depends on:  How much alcohol you drank.  Your age, your weight, and whether you are a man or a woman.  How often you drink alcohol.  If you have other medical problems.  Alcohol intoxication can range from mild to very bad. It can be dangerous, especially if you:  Drink a large amount of alcohol in a short time (binge drink).  For women, binge drinking is having four or more drinks at one time.  For men, binge drinking is having five or more drinks at one time.  Take certain drugs or medicines.  If you or anyone around you seems intoxicated:  Tell someone.  Get help from someone.  Follow these instructions at home:  Eating and drinking    Ask your doctor if alcohol is safe for you.  If your doctor says that alcohol is safe for you, limit how much you drink to no more than 1 drink a day for women who are not pregnant and 2 drinks a day for men. One drink equals one of these:  12 oz of beer.  5 oz of wine.  1½ oz of hard liquor.  Do not drink alcohol if:  Your doctor tells you not to drink.  You are pregnant, may be pregnant, or are planning to get pregnant.  You are under the legal drinking age (21 years old in the [place]  You are taking medicines that you should not take with alcohol.  Alcohol causes your medical problem to get worse.  You have to drive or do activities that need you to be alert.  You have  substance use disorder. This is when using alcohol again and again causes problems with your health, your relationships, or with what you need to do at work, home, or school.  Be sure to eat before you drink alcohol. Avoid drinking when you have an empty stomach.  Make sure you have enough fluid in your body (stay hydrated). To do this:  Drink enough fluid to keep your pee (urine) pale yellow.  Avoid caffeine, which may be in coffee, tea, and some sodas. Caffeine can make you thirsty.  Try not to drink more than one drink an hour.  If you are having more than one drink, have a drink without alcohol (such as water) between your drinks.  General instructions    Take over-the-counter and prescription medicines only as told by your doctor.  Do not drive after drinking any amount of alcohol. Plan for a designated  or another way to go home.  Have someone you trust stay with you while you are intoxicated. Youshould not be left alone.  Keep all follow-up visits as told by your doctor. This is important.  Contact a doctor if:  You do not feel better after a few days.  You have problems at work, at school, or at home due to drinking.  Get help right away if:  You have any of the following:  Moderate or very bad trouble with:  Movement (coordination).  Talking.  Memory.  Paying attention to things.  Trouble staying awake.  Being very confused.  Jerky movements that you cannot control (seizure).  Light-headedness.  Fainting.  Throwing up (vomiting) blood. The blood may be bright red or look like coffee grounds.  Blood in your poop (stool). The blood may:  Be bright red.  Make your poop black and tarry and make it smell bad.  Feeling shaky when you try to stop drinking.  Thoughts about hurting yourself or others.  If you ever feel like you may hurt yourself or others, or have thoughts about taking your own life, get help right away. You can go to your nearest emergency department or call:  Your local emergency services  (911 in the [place]  A suicide crisis helpline, such as the National Suicide Prevention Lifeline at 1-366.615.3484. This is open 24 hours a day.  Summary  Alcohol intoxication happens when you cannot think clearly or function well (get impaired) after drinking alcohol. This can happen after just one drink.  If your doctor says that alcohol is safe for you, limit how much you drink to no more than 1 drink a day for women who are not pregnant and 2 drinks a day for men.  Contact a doctor if you have problems at work, at school, or at home due to drinking.  Get help right away if you have thoughts about hurting yourself or others.  This information is not intended to replace advice given to you by your health care provider. Make sure you discuss any questions you have with your health care provider.  Document Released: 06/05/2009 Document Revised: 04/09/2019 Document Reviewed: 04/09/2019  Elsevier Patient Education © 2020 [employer]

## 2023-02-09 NOTE — ED NOTES
Pt reports she is withdrawing from Geodon, pt states she ran out of medication 6 days ago and is unsure of why it was prescribed to her. Pt reports wanting to die but refusing to answer with a plan. Pt reports auditory hallucinations to ED tech

## 2023-02-09 NOTE — ASSESSMENT & PLAN NOTE
Under legal hold with sitter in room.  Stopped Zyprexa  On lexapro 10mg and seroquel.  Psych consulting.and recommending inpatient psychiatric facility

## 2023-02-09 NOTE — ED NOTES
Med rec completed per patient's spouse Ki ([phone]) and patient's pharmacy [facility] ([phone]).  Allergies reviewed with spouse.  No outpatient antibiotics within the last 30 days.    Patient's spouse states that patient ran out of her escitalopram, lamotrigine, and ziprasidone about 6 days ago.  Patient is on a 10 day course of potassium chloride prescribed 2/3/2023.

## 2023-02-09 NOTE — ED NOTES
Pt cooperative at this time, 2 restraints removed, pt medicated per MAR for tremors.   R ankle swelling and bruising noted. Xray ordered.

## 2023-02-09 NOTE — DISCHARGE PLANNING
Case Management Discharge Planning    Admission Date: 2/8/2023  GMLOS:    ALOS: 0    6-Clicks ADL Score: 24  6-Clicks Mobility Score: 24      Anticipated Discharge Dispo: Discharge Disposition: D/T to psych hosp or distinct part unit (65)    Medically Clear: No    Next Steps: f/u with pt and medical team to discuss dc needs and barriers.    Barriers to Discharge: Medical clearance, Legal hold, CIWA

## 2023-02-09 NOTE — ED NOTES
Report to NILES Colon  Pt transported to T703-02 via [facility] on monitor with RN and Tech

## 2023-02-09 NOTE — ED NOTES
Walking boot applied to R foot. Pt unable to use breathalyzer appropriately, blood draw order placed. ALERT team aware of pt.

## 2023-02-09 NOTE — PROGRESS NOTES
4 Eyes Skin Assessment Completed by NILES Colon and JAMES Bowden    Head WDL  Ears WDL  Nose WDL  Mouth WDL- pearcings  Neck WDL  Breast/Chest WDL  Shoulder Blades WDL  Spine WDL  (R) Arm/Elbow/Hand WDL  (L) Arm/Elbow/Hand WDL  Abdomen WDL  Groin WDL  Scrotum/Coccyx/Buttocks WDL- scratches  (R) Leg Bruising  (L) Leg Bruising  (R) Heel/Foot/Toe Bruising and Swelling  (L) Heel/Foot/Toe Scab          Devices In Places Tele Box and Pulse Ox      Interventions In Place N/A- R foot walking boot in place    Possible Skin Injury No    Pictures Uploaded Into Epic N/A  Wound Consult Placed N/A  RN Wound Prevention Protocol Ordered No

## 2023-02-09 NOTE — H&P
Hospital Medicine History & Physical Note    Date of Service  2/9/2023    Primary Care Physician  Pcp Pt States None    Consultants  Psychiatry    Code Status  Full Code    Chief Complaint  Chief Complaint   Patient presents with    Legal 2000     BIB MS from home after pts  called PD because pt was attempting to crawl out a window and stated she wanted to die.        History of Presenting Illness  Jessie Carney is a 22 y.o. female with history of alcohol abuse, tobacco abuse who presented 2/8/2023 with evaluation for altered mental status, suicidal ideation.  Per chart review, patient's , police due to patient being aggressive with spouse and attempting to jump out of the window to end her life, endorsing suicidal ideation.  She has been placed on legal hold.  In ER, found to have blood alcohol level 72.5, she still admits to having passive suicidal ideation at time of my evaluation.  Patient was reported to have been aggressive with staff (kicked security) in the ER and placed in upper extremities restraints earlier.  She was pleasant and cooperative at time of my evaluation.  Psychiatry has been consulted.  Admission requested by ERP for further evaluation and treatment.    I discussed the plan of care with patient and bedside RN.    Review of Systems  Review of Systems   Constitutional:  Positive for malaise/fatigue. Negative for chills and fever.   HENT:  Negative for hearing loss and tinnitus.    Eyes:  Negative for blurred vision and double vision.   Respiratory:  Negative for cough and shortness of breath.    Cardiovascular:  Positive for palpitations. Negative for chest pain.   Gastrointestinal:  Positive for heartburn, nausea and vomiting. Negative for abdominal pain.   Genitourinary:  Negative for dysuria and urgency.   Musculoskeletal:  Negative for joint pain and myalgias.   Skin:  Negative for itching and rash.   Neurological:  Positive for weakness. Negative for dizziness and  headaches.   Endo/Heme/Allergies:  Negative for environmental allergies. Does not bruise/bleed easily.   Psychiatric/Behavioral:  Positive for depression and suicidal ideas. The patient is nervous/anxious.    All other systems reviewed and are negative.    Past Medical History   has a past medical history of Asthma, Intractable nausea and vomiting (2/9/2023), and Migraine.    Surgical History   has a past surgical history that includes alfredo by laparoscopy (8/3/2018) and cystectomy (07/30/2018).     Family History  family history includes Cancer in her maternal grandmother; No Known Problems in her brother, father, maternal grandfather, mother, paternal grandfather, and paternal grandmother.   Family history reviewed with patient. There is no family history that is pertinent to the chief complaint.     Social History   reports that she has quit smoking. Her smoking use included cigarettes. She has a 1.50 pack-year smoking history. She has never used smokeless tobacco. She reports current alcohol use of about 3.0 oz per week. She reports current drug use. Drugs: Marijuana and Inhaled.    Allergies  Allergies   Allergen Reactions    Lanolin Rash     .    Pcn [Penicillins] Rash     .      Sulfa Drugs Rash     .         Medications  Prior to Admission Medications   Prescriptions Last Dose Informant Patient Reported? Taking?   Prenatal Vit-Fe Fumarate-FA (PRENATAL VITAMIN) 27-0.8 MG Tab   Yes No   Sig: Take  by mouth.   ferrous sulfate 325 (65 Fe) MG tablet   No No   Sig: TAKE 1 TABLET BY MOUTH EVERY DAY   Patient not taking: Reported on 10/9/2019      Facility-Administered Medications: None       Physical Exam  Temp:  [37.1 °C (98.7 °F)] 37.1 °C (98.7 °F)  Pulse:  [] 116  Resp:  [18-22] 18  BP: (130-151)/(70-96) 149/96  SpO2:  [93 %-96 %] 96 %  Blood Pressure: (!) 141/82   Temperature: 37.1 °C (98.7 °F)   Pulse: 94   Respiration: (!) 22   Pulse Oximetry: 96 %       Physical Exam  Vitals and nursing note reviewed.    Constitutional:       General: She is not in acute distress.  HENT:      Head: Normocephalic and atraumatic.      Nose: Nose normal.      Mouth/Throat:      Mouth: Mucous membranes are moist.      Pharynx: Oropharynx is clear.   Eyes:      General: No scleral icterus.     Extraocular Movements: Extraocular movements intact.   Cardiovascular:      Rate and Rhythm: Regular rhythm. Tachycardia present.      Pulses: Normal pulses.      Heart sounds:     No friction rub.   Pulmonary:      Effort: No respiratory distress.      Breath sounds: No stridor. No wheezing or rales.   Abdominal:      General: There is distension.      Tenderness: There is no abdominal tenderness. There is no guarding or rebound.   Musculoskeletal:      Cervical back: Neck supple. No tenderness.      Comments: Right foot - boot in place  FULL ROM of left foot   Skin:     General: Skin is warm and dry.      Capillary Refill: Capillary refill takes less than 2 seconds.   Neurological:      Mental Status: She is alert and oriented to person, place, and time.      Comments: Minimally verbal  Follows command appropriately  Cooperative with questioning   Psychiatric:      Comments: Flat affect       Laboratory:  Recent Labs     02/09/23  0120   WBC 7.5   RBC 4.17*   HEMOGLOBIN 12.0   HEMATOCRIT 34.4*   MCV 82.5   MCH 28.8   MCHC 34.9   RDW 47.7   PLATELETCT 148*   MPV 9.1     Recent Labs     02/09/23  0120   SODIUM 132*   POTASSIUM 3.6   CHLORIDE 92*   CO2 20   GLUCOSE 93   BUN 6*   CREATININE 0.56   CALCIUM 8.8     Recent Labs     02/09/23  0120   GLUCOSE 93         No results for input(s): NTPROBNP in the last 72 hours.      No results for input(s): TROPONINT in the last 72 hours.    Imaging:  DX-FOOT-COMPLETE 3+ RIGHT   Final Result         1.  Small bony fragment at the dorsal base of the metatarsals on lateral view, appearance suggests small ligamentous avulsion fracture fragment, although it is uncertain which metatarsal.      DX-ANKLE 3+ VIEWS  RIGHT   Final Result         1.  No acute traumatic bony injury.             X-Ray:  I have personally reviewed the images and compared with prior images.    Assessment/Plan:  Justification for Admission Status  I anticipate this patient will require at least 2 midnights of hospitalization, therefore appropriate for inpatient status.        * Alcohol withdrawal delirium, acute, hyperactive (HCC)- (present on admission)  Assessment & Plan  Cessation advised  Detox bag, MV  Scheduled valium  CIWA    Suicidal ideation  Assessment & Plan  Under legal hold  Continue Zyprexa  Psych f/u appreciated    Intractable nausea and vomiting  Assessment & Plan  IVF  Supportive anti-emetic  pepcid    Dehydration  Assessment & Plan  IVF    Closed avulsion fracture of metatarsal bone of right foot  Assessment & Plan  Noted on Xray right foot  Boot in place  Supportive pain control        VTE prophylaxis: SCDs/TEDs

## 2023-02-09 NOTE — ED NOTES
Pt kicked , pt taken to [facility], placed in leather restraints. Pt able to unclick soft restraints.

## 2023-02-09 NOTE — ED NOTES
Pt changed into gown, all belongings at nurses station, security at pt's door d/t pt trying to walk out of room, pt is redirectable at this time. Given ice chips.

## 2023-02-09 NOTE — DISCHARGE PLANNING
Alert Team:    Collateral information received from pt's significant other; pt was combative at home prior to being transferred to ED. She was repeatedly kicking her  and then attempt to jump out of a window in an attempt to end her life. Pt making threats about harming her significant other and her two children who were present in the home during incident. Police report filed online with [place] [employer] and duty to warn completed regarding HI toward pt's siginifcant other, Ki Morales. CPS report made regarding comments about harming her children and incident that occurred last night with kids present.

## 2023-02-09 NOTE — PROGRESS NOTES
Patient seen and examined, going through alcohol withdraws with a CIWA score o 165 on CIWA protocol and also on valium   HR in the 160 this am gave 5 mg of metoprolol IV and starting on oral metoprolol   Monitor on tele   Patient also on legal hold for SI psychiatry is following legal hold extended appreciate rec

## 2023-02-09 NOTE — ASSESSMENT & PLAN NOTE
Noted on Xray right foot  Boot changed to walking boot for comfort   Supportive pain control  2/11 started calcium and vitamin D  2/13 patient complains of boot causing more swelling and pain.  I discussed with Ortho PA and he is placing an order for other brace/shoe  Outpatient ortho follow up
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
## 2023-02-10 PROBLEM — F12.90 CANNABIS USE DISORDER: Status: ACTIVE | Noted: 2023-02-10

## 2023-02-10 PROBLEM — E66.3 OVERWEIGHT: Status: ACTIVE | Noted: 2023-02-10

## 2023-02-10 PROBLEM — R74.8 ELEVATED LIVER ENZYMES: Status: ACTIVE | Noted: 2023-02-10

## 2023-02-10 PROBLEM — E87.1 HYPONATREMIA: Status: ACTIVE | Noted: 2023-02-10

## 2023-02-10 LAB
ALBUMIN SERPL BCP-MCNC: 3.7 G/DL (ref 3.2–4.9)
ALBUMIN/GLOB SERPL: 1.7 G/DL
ALP SERPL-CCNC: 82 U/L (ref 30–99)
ALT SERPL-CCNC: 48 U/L (ref 2–50)
ANION GAP SERPL CALC-SCNC: 9 MMOL/L (ref 7–16)
AST SERPL-CCNC: 111 U/L (ref 12–45)
BASOPHILS # BLD AUTO: 0.7 % (ref 0–1.8)
BASOPHILS # BLD: 0.03 K/UL (ref 0–0.12)
BILIRUB SERPL-MCNC: 1.6 MG/DL (ref 0.1–1.5)
BUN SERPL-MCNC: 12 MG/DL (ref 8–22)
CALCIUM ALBUM COR SERPL-MCNC: 8.9 MG/DL (ref 8.5–10.5)
CALCIUM SERPL-MCNC: 8.7 MG/DL (ref 8.5–10.5)
CHLORIDE SERPL-SCNC: 99 MMOL/L (ref 96–112)
CO2 SERPL-SCNC: 25 MMOL/L (ref 20–33)
CREAT SERPL-MCNC: 0.91 MG/DL (ref 0.5–1.4)
EOSINOPHIL # BLD AUTO: 0.07 K/UL (ref 0–0.51)
EOSINOPHIL NFR BLD: 1.7 % (ref 0–6.9)
ERYTHROCYTE [DISTWIDTH] IN BLOOD BY AUTOMATED COUNT: 49.5 FL (ref 35.9–50)
GFR SERPLBLD CREATININE-BSD FMLA CKD-EPI: 91 ML/MIN/1.73 M 2
GLOBULIN SER CALC-MCNC: 2.2 G/DL (ref 1.9–3.5)
GLUCOSE SERPL-MCNC: 81 MG/DL (ref 65–99)
HCT VFR BLD AUTO: 34.5 % (ref 37–47)
HGB BLD-MCNC: 11.5 G/DL (ref 12–16)
IMM GRANULOCYTES # BLD AUTO: 0.03 K/UL (ref 0–0.11)
IMM GRANULOCYTES NFR BLD AUTO: 0.7 % (ref 0–0.9)
LYMPHOCYTES # BLD AUTO: 1.56 K/UL (ref 1–4.8)
LYMPHOCYTES NFR BLD: 38.1 % (ref 22–41)
MAGNESIUM SERPL-MCNC: 2.2 MG/DL (ref 1.5–2.5)
MCH RBC QN AUTO: 29.2 PG (ref 27–33)
MCHC RBC AUTO-ENTMCNC: 33.3 G/DL (ref 33.6–35)
MCV RBC AUTO: 87.6 FL (ref 81.4–97.8)
MONOCYTES # BLD AUTO: 0.3 K/UL (ref 0–0.85)
MONOCYTES NFR BLD AUTO: 7.3 % (ref 0–13.4)
NEUTROPHILS # BLD AUTO: 2.1 K/UL (ref 2–7.15)
NEUTROPHILS NFR BLD: 51.5 % (ref 44–72)
NRBC # BLD AUTO: 0 K/UL
NRBC BLD-RTO: 0 /100 WBC
PHOSPHATE SERPL-MCNC: 3.2 MG/DL (ref 2.5–4.5)
PLATELET # BLD AUTO: 96 K/UL (ref 164–446)
PMV BLD AUTO: 9.5 FL (ref 9–12.9)
POTASSIUM SERPL-SCNC: 3.7 MMOL/L (ref 3.6–5.5)
PROT SERPL-MCNC: 5.9 G/DL (ref 6–8.2)
RBC # BLD AUTO: 3.94 M/UL (ref 4.2–5.4)
SODIUM SERPL-SCNC: 133 MMOL/L (ref 135–145)
WBC # BLD AUTO: 4.1 K/UL (ref 4.8–10.8)

## 2023-02-10 PROCEDURE — 99233 SBSQ HOSP IP/OBS HIGH 50: CPT | Performed by: HOSPITALIST

## 2023-02-10 PROCEDURE — 700105 HCHG RX REV CODE 258: Performed by: INTERNAL MEDICINE

## 2023-02-10 PROCEDURE — A9270 NON-COVERED ITEM OR SERVICE: HCPCS | Performed by: STUDENT IN AN ORGANIZED HEALTH CARE EDUCATION/TRAINING PROGRAM

## 2023-02-10 PROCEDURE — A9270 NON-COVERED ITEM OR SERVICE: HCPCS | Performed by: HOSPITALIST

## 2023-02-10 PROCEDURE — 700111 HCHG RX REV CODE 636 W/ 250 OVERRIDE (IP): Performed by: STUDENT IN AN ORGANIZED HEALTH CARE EDUCATION/TRAINING PROGRAM

## 2023-02-10 PROCEDURE — 700102 HCHG RX REV CODE 250 W/ 637 OVERRIDE(OP): Performed by: HOSPITALIST

## 2023-02-10 PROCEDURE — 83735 ASSAY OF MAGNESIUM: CPT

## 2023-02-10 PROCEDURE — 700101 HCHG RX REV CODE 250: Performed by: INTERNAL MEDICINE

## 2023-02-10 PROCEDURE — 80053 COMPREHEN METABOLIC PANEL: CPT

## 2023-02-10 PROCEDURE — 84100 ASSAY OF PHOSPHORUS: CPT

## 2023-02-10 PROCEDURE — 700111 HCHG RX REV CODE 636 W/ 250 OVERRIDE (IP): Performed by: INTERNAL MEDICINE

## 2023-02-10 PROCEDURE — A9270 NON-COVERED ITEM OR SERVICE: HCPCS | Performed by: EMERGENCY MEDICINE

## 2023-02-10 PROCEDURE — 700102 HCHG RX REV CODE 250 W/ 637 OVERRIDE(OP): Performed by: EMERGENCY MEDICINE

## 2023-02-10 PROCEDURE — 770000 HCHG ROOM/CARE - INTERMEDIATE ICU *

## 2023-02-10 PROCEDURE — 85025 COMPLETE CBC W/AUTO DIFF WBC: CPT

## 2023-02-10 PROCEDURE — 700102 HCHG RX REV CODE 250 W/ 637 OVERRIDE(OP): Performed by: STUDENT IN AN ORGANIZED HEALTH CARE EDUCATION/TRAINING PROGRAM

## 2023-02-10 RX ADMIN — NICOTINE TRANSDERMAL SYSTEM 21 MG: 21 PATCH, EXTENDED RELEASE TRANSDERMAL at 06:16

## 2023-02-10 RX ADMIN — RIVAROXABAN 10 MG: 10 TABLET, FILM COATED ORAL at 17:45

## 2023-02-10 RX ADMIN — OLANZAPINE 10 MG: 5 TABLET, ORALLY DISINTEGRATING ORAL at 17:45

## 2023-02-10 RX ADMIN — OXYCODONE HYDROCHLORIDE 5 MG: 5 TABLET ORAL at 15:53

## 2023-02-10 RX ADMIN — FAMOTIDINE 20 MG: 10 INJECTION, SOLUTION INTRAVENOUS at 17:44

## 2023-02-10 RX ADMIN — FAMOTIDINE 20 MG: 10 INJECTION, SOLUTION INTRAVENOUS at 06:00

## 2023-02-10 RX ADMIN — DEXMEDETOMIDINE 1 MCG/KG/HR: 200 INJECTION, SOLUTION INTRAVENOUS at 01:37

## 2023-02-10 RX ADMIN — LORAZEPAM 2 MG: 2 INJECTION INTRAMUSCULAR; INTRAVENOUS at 17:45

## 2023-02-10 RX ADMIN — LORAZEPAM 2 MG: 2 INJECTION INTRAMUSCULAR; INTRAVENOUS at 05:58

## 2023-02-10 RX ADMIN — LORAZEPAM 2 MG: 2 INJECTION INTRAMUSCULAR; INTRAVENOUS at 11:53

## 2023-02-10 RX ADMIN — OXYCODONE HYDROCHLORIDE 5 MG: 5 TABLET ORAL at 19:31

## 2023-02-10 RX ADMIN — DEXMEDETOMIDINE 0.1 MCG/KG/HR: 200 INJECTION, SOLUTION INTRAVENOUS at 18:06

## 2023-02-10 RX ADMIN — DEXMEDETOMIDINE 1 MCG/KG/HR: 200 INJECTION, SOLUTION INTRAVENOUS at 06:39

## 2023-02-10 RX ADMIN — LORAZEPAM 2 MG: 2 INJECTION INTRAMUSCULAR; INTRAVENOUS at 14:27

## 2023-02-10 RX ADMIN — LORAZEPAM 2 MG: 2 INJECTION INTRAMUSCULAR; INTRAVENOUS at 22:24

## 2023-02-10 ASSESSMENT — FIBROSIS 4 INDEX
FIB4 SCORE: 1.1
FIB4 SCORE: 2.06

## 2023-02-10 ASSESSMENT — LIFESTYLE VARIABLES
TOTAL SCORE: VERY MILD ITCHING, PINS AND NEEDLES SENSATION, BURNING OR NUMBNESS
VISUAL DISTURBANCES: VERY MILD SENSITIVITY
PAROXYSMAL SWEATS: BARELY PERCEPTIBLE SWEATING. PALMS MOIST
VISUAL DISTURBANCES: VERY MILD SENSITIVITY
HEADACHE, FULLNESS IN HEAD: VERY MILD
ORIENTATION AND CLOUDING OF SENSORIUM: ORIENTED AND CAN DO SERIAL ADDITIONS
AGITATION: SOMEWHAT MORE THAN NORMAL ACTIVITY
ORIENTATION AND CLOUDING OF SENSORIUM: CANNOT DO SERIAL ADDITIONS OR IS UNCERTAIN ABOUT DATE
AGITATION: NORMAL ACTIVITY
AGITATION: NORMAL ACTIVITY
TREMOR: *
NAUSEA AND VOMITING: NO NAUSEA AND NO VOMITING
TREMOR: MODERATE TREMOR WITH ARMS EXTENDED
TOTAL SCORE: 13
AUDITORY DISTURBANCES: NOT PRESENT
HEADACHE, FULLNESS IN HEAD: NOT PRESENT
ANXIETY: *
VISUAL DISTURBANCES: NOT PRESENT
AUDITORY DISTURBANCES: NOT PRESENT
HEADACHE, FULLNESS IN HEAD: NOT PRESENT
ANXIETY: NO ANXIETY (AT EASE)
ORIENTATION AND CLOUDING OF SENSORIUM: CANNOT DO SERIAL ADDITIONS OR IS UNCERTAIN ABOUT DATE
AUDITORY DISTURBANCES: VERY MILD HARSHNESS OR ABILITY TO FRIGHTEN
ORIENTATION AND CLOUDING OF SENSORIUM: ORIENTED AND CAN DO SERIAL ADDITIONS
VISUAL DISTURBANCES: NOT PRESENT
ORIENTATION AND CLOUDING OF SENSORIUM: CANNOT DO SERIAL ADDITIONS OR IS UNCERTAIN ABOUT DATE
AUDITORY DISTURBANCES: NOT PRESENT
TREMOR: MODERATE TREMOR WITH ARMS EXTENDED
VISUAL DISTURBANCES: NOT PRESENT
HEADACHE, FULLNESS IN HEAD: NOT PRESENT
PAROXYSMAL SWEATS: NO SWEAT VISIBLE
AGITATION: NORMAL ACTIVITY
AGITATION: NORMAL ACTIVITY
TREMOR: *
NAUSEA AND VOMITING: NO NAUSEA AND NO VOMITING
ANXIETY: *
NAUSEA AND VOMITING: NO NAUSEA AND NO VOMITING
AUDITORY DISTURBANCES: VERY MILD HARSHNESS OR ABILITY TO FRIGHTEN
ANXIETY: NO ANXIETY (AT EASE)
TOTAL SCORE: 5
PAROXYSMAL SWEATS: NO SWEAT VISIBLE
AUDITORY DISTURBANCES: NOT PRESENT
PAROXYSMAL SWEATS: BARELY PERCEPTIBLE SWEATING. PALMS MOIST
ANXIETY: NO ANXIETY (AT EASE)
ORIENTATION AND CLOUDING OF SENSORIUM: ORIENTED AND CAN DO SERIAL ADDITIONS
HEADACHE, FULLNESS IN HEAD: VERY MILD
TOTAL SCORE: 4
NAUSEA AND VOMITING: NO NAUSEA AND NO VOMITING
TREMOR: *
PAROXYSMAL SWEATS: BARELY PERCEPTIBLE SWEATING. PALMS MOIST
TREMOR: MODERATE TREMOR WITH ARMS EXTENDED
AGITATION: SOMEWHAT MORE THAN NORMAL ACTIVITY
TOTAL SCORE: 5
TOTAL SCORE: 3
ANXIETY: NO ANXIETY (AT EASE)
PAROXYSMAL SWEATS: NO SWEAT VISIBLE
HEADACHE, FULLNESS IN HEAD: NOT PRESENT
TOTAL SCORE: 13
VISUAL DISTURBANCES: NOT PRESENT
TOTAL SCORE: VERY MILD ITCHING, PINS AND NEEDLES SENSATION, BURNING OR NUMBNESS

## 2023-02-10 ASSESSMENT — PAIN DESCRIPTION - PAIN TYPE
TYPE: ACUTE PAIN

## 2023-02-10 NOTE — PROGRESS NOTES
Hospital Medicine Daily Progress Note    Date of Service  2/10/2023    Chief Complaint  Suicide attempt, attempt to crawl out a window.    Hospital Course  Jessie Carney is a 22 y.o. female wit alcohol abuse, MDD, PTSD admitted 2/8/2023 with suicide attempt and hallucinations.  She was admitted for psychosis and SI. During her admit there were concerns of EtOH withdrawal and patient was transferred to [facility] for higher level of care and Precedex drip.    Interval Problem Update  2/10: On Precedex and sedate.  Responds vocally but slow and follows some commands.  States year and knows she is in [facility].    I have discussed this patient's plan of care and discharge plan at IDT rounds today with Case Management, Nursing, Nursing leadership, and other members of the IDT team.    Consultants/Specialty  psychiatry    Code Status  Full Code    Disposition  Patient is not medically cleared for discharge.   Anticipate discharge to to home with close outpatient follow-up.  I have placed the appropriate orders for post-discharge needs.    Review of Systems  Review of Systems   Unable to perform ROS: Mental acuity      Physical Exam  Temp:  [36.4 °C (97.6 °F)-37.3 °C (99.1 °F)] 36.7 °C (98.1 °F)  Pulse:  [] 82  Resp:  [15-38] 16  BP: ()/(55-97) 111/61  SpO2:  [93 %-97 %] 96 %    Physical Exam  Vitals reviewed.   Constitutional:       Appearance: Normal appearance. She is overweight. She is not diaphoretic.      Interventions: She is sedated and restrained.   HENT:      Head: Normocephalic and atraumatic.      Nose: Nose normal.      Mouth/Throat:      Mouth: Mucous membranes are moist.      Pharynx: No oropharyngeal exudate.   Eyes:      General: No scleral icterus.        Right eye: No discharge.         Left eye: No discharge.      Extraocular Movements: Extraocular movements intact.      Conjunctiva/sclera: Conjunctivae normal.   Cardiovascular:      Rate and Rhythm: Normal rate and regular rhythm.      Pulses:            Radial pulses are 2+ on the right side and 2+ on the left side.        Dorsalis pedis pulses are 2+ on the right side and 2+ on the left side.      Heart sounds: No murmur heard.  Pulmonary:      Effort: Pulmonary effort is normal. No respiratory distress.      Breath sounds: Normal breath sounds. No wheezing or rales.   Abdominal:      General: Bowel sounds are normal. There is no distension.      Palpations: Abdomen is soft.      Tenderness: There is no abdominal tenderness.   Musculoskeletal:         General: No swelling or tenderness.      Cervical back: No tenderness. No muscular tenderness.      Right lower leg: No edema.      Left lower leg: No edema.   Lymphadenopathy:      Cervical: No cervical adenopathy.   Skin:     Coloration: Skin is not jaundiced or pale.      Findings: Bruising (right foot and lower leg) present.   Neurological:      Mental Status: She is lethargic.      Cranial Nerves: No cranial nerve deficit.   Psychiatric:         Speech: Speech is delayed.         Behavior: Behavior is slowed.       Fluids    Intake/Output Summary (Last 24 hours) at 2/10/2023 1135  Last data filed at 2/10/2023 1110  Gross per 24 hour   Intake 240.34 ml   Output --   Net 240.34 ml       Laboratory  Recent Labs     02/09/23  0120 02/10/23  0520   WBC 7.5 4.1*   RBC 4.17* 3.94*   HEMOGLOBIN 12.0 11.5*   HEMATOCRIT 34.4* 34.5*   MCV 82.5 87.6   MCH 28.8 29.2   MCHC 34.9 33.3*   RDW 47.7 49.5   PLATELETCT 148* 96*   MPV 9.1 9.5     Recent Labs     02/09/23  0120 02/10/23  0520   SODIUM 132* 133*   POTASSIUM 3.6 3.7   CHLORIDE 92* 99   CO2 20 25   GLUCOSE 93 81   BUN 6* 12   CREATININE 0.56 0.91   CALCIUM 8.8 8.7                   Imaging  DX-FOOT-COMPLETE 3+ RIGHT   Final Result         1.  Small bony fragment at the dorsal base of the metatarsals on lateral view, appearance suggests small ligamentous avulsion fracture fragment, although it is uncertain which metatarsal.      DX-ANKLE 3+ VIEWS RIGHT   Final  Result         1.  No acute traumatic bony injury.              Assessment/Plan  * Alcohol withdrawal delirium, acute, hyperactive (HCC)- (present on admission)  Assessment & Plan  On Precedex drip and scheduled ativan (per protocol), attempt to wean precedex as able.  Thiamine and MVI.  Once more awake ongoing discussion for EtOH sobriety      Hyponatremia  Assessment & Plan  Appears euvolemic  IV fluids  Monitor BMP  2/10 Na:133    Elevated liver enzymes  Assessment & Plan  EtOH use likely.  Possible component of hepatosteatosis   Monitor cmp    Cannabis use disorder  Assessment & Plan  Positive on drug scree    Overweight  Assessment & Plan  Body mass index is 27.65 kg/m².  Encourage active and healthy lifestyle choices once more alert    Closed avulsion fracture of metatarsal bone of right foot  Assessment & Plan  Noted on Xray right foot  Boot in place  Supportive pain control  Future calcium and vitamin D    Intractable nausea and vomiting  Assessment & Plan  IVF  Supportive anti-emetic prn  pepcid    Dehydration  Assessment & Plan  IVF    Suicidal ideation  Assessment & Plan  Under legal hold with sitter in room.  Continue Zyprexa  Psych consulting.         VTE prophylaxis: SCDs/TEDs    I have performed a physical exam and reviewed and updated ROS and Plan today (2/10/2023). In review of yesterday's note (2/9/2023), there are no changes except as documented above.

## 2023-02-10 NOTE — PROGRESS NOTES
Writer alerted to room. Pt recently medicated per IRLANDA and continuing to present with aggressive behavior. Pt attempting to kick safety sitter and pull out IV's. Writer attempted to verbally de-escalate pt to no avail. Md Kiran notified and order for nv restraints received.

## 2023-02-10 NOTE — PROGRESS NOTES
Pt's  called and updated on status change and transfer to icu. Report given bedside to receiving NILES Ugalde. All belongings sent with pt to icu.

## 2023-02-10 NOTE — PROGRESS NOTES
Pt to room t604.  Pt is confused and slurred words.  Making NPO and MD made aware.  Pt on legal and everything removed from room.

## 2023-02-10 NOTE — PROGRESS NOTES
[facility] Acceptance Note    Called by hospitalist for admission to IMCU for 22-year-old female, going through severe alcohol withdrawal, actively hallucinating with high CIWA scores, on legal hold.  Severe alcohol withdrawal order placed to include scheduled and as needed Ativan, dexmedetomidine infusion.  Will admit to IMCU under the care of the hospitalist.

## 2023-02-10 NOTE — CONSULTS
"  [facility] PSYCHIATRIC FOLLOW-UP:(established)    DOS: 02/10/23     Reason for admission:  \"Patient was brought in by EMS, activated by her , due to suicidal ideations, attempting to crawl and jump out of the window.  During rounds, patient is delirious, unable to fully participate in evaluation.  She is currently going through alcohol withdrawal.  Patient has a history of mental illness including MDD, PTSD and alcohol dependence.  It appears that she was noncompliant with the Geodon for the past few days.  Patient has 2 previous suicide attempts.  Patient at this time continues to have acute elevated risk for danger to himself, therefore I am extending legal hold for further psychiatric stabilization once her alcohol withdrawal/delirium resolves.\"  Legal Hold Status: on legal hold      ID/Chief Complaint:    Chief Complaint   Patient presents with    Legal 2000     BIB MS from home after pts  called PD because pt was attempting to crawl out a window and stated she wanted to die.              Chart review - see excellent intake 2/9/23 for further psych background. Transferred to ICU d/t severity of withdrawals.     S:  Seen today as f/u psych consult. Mood is \"buzz\" says \"maybe\" when asked if this means anxiety. States she is here because of \"a mental breakdown and DT's\" - notes one prior episode of DT's in c/o ETOH withdrawal, which occurred this year. Appetite intact. Denies current SI/HI, A/VH. Knows she is in a hospital, not sure which, says full date, knows president. Notes pain 6-7/10 in R foot, requests ice chips and pain medication.       O: Medical ROS (as pertinent): No new changes reported to this writer.    Recent Labs     02/09/23  0120 02/10/23  0520   WBC 7.5 4.1*   RBC 4.17* 3.94*   HEMOGLOBIN 12.0 11.5*   HEMATOCRIT 34.4* 34.5*   MCV 82.5 87.6   MCH 28.8 29.2   RDW 47.7 49.5   PLATELETCT 148* 96*   MPV 9.1 9.5   NEUTSPOLYS 76.60* 51.50   LYMPHOCYTES 17.80* 38.10 " "  MONOCYTES 4.70 7.30   EOSINOPHILS 0.10 1.70   BASOPHILS 0.40 0.70     Recent Labs     02/09/23  0120 02/10/23  0520   SODIUM 132* 133*   POTASSIUM 3.6 3.7   CHLORIDE 92* 99   CO2 20 25   GLUCOSE 93 81   BUN 6* 12     Recent Labs     02/09/23  0120 02/10/23  0520   ALBUMIN  --  3.7   TBILIRUBIN  --  1.6*   ALKPHOSPHAT  --  82   TOTPROTEIN  --  5.9*   ALTSGPT  --  48   ASTSGOT  --  111*   CREATININE 0.56 0.91         PSYCHIATRIC EXAM (MSE):   /64   Pulse 82   Temp 36.4 °C (97.6 °F)   Resp 20   Ht 1.727 m (5' 8\")   Wt 82.5 kg (181 lb 14.1 oz)   SpO2 96%     Constitutional: as noted above  General Appearance/Behavior: 22 y.o. appears stated age, normal habitus good eye contact superficially cooperative not able to participate meaningfully, No behavioral disturbances  Abnormal Movements: none, no PMA/PMR or tremor observed.  Gait and Posture: not observed  Musculoskeletal: as noted above  Mood: \"buzz\"  Affect: very somnolent and difficult to engage   Speech: short quiet responses  Language:  spontaneous, comprehends spoken commands, and fluent   Thought Process: Linear and Logical   Thought Content: Denies current SI/HI. Denies A/VH, no e/o delusions, PI, or internal preoccupation.   Insight/Judgement:  limited  Alert/Orientation: stuporous, oriented to person, place and time  Attn/Concentration: difficulty focusing  Fund of Knowledge: average, names president  Memory recent/remote: No gross evidence of memory deficits  MMSE: deferred this visit          Meds Current:  Scheduled Medications   Medication Dose Frequency    rivaroxaban  10 mg DAILY AT 1800    thiamine  100 mg DAILY    And    multivitamin  1 Tablet DAILY    And    folic acid  1 mg DAILY    famotidine  20 mg BID    senna-docusate  2 Tablet BID    nicotine  21 mg Daily-0600    Pharmacy Consult Request  1 Each PHARMACY TO DOSE    LORazepam  2 mg Q4HRS    MD Alert...Adult ICU Electrolyte Replacement per Pharmacy   PHARMACY TO DOSE    OLANZapine " "zydis  10 mg Q EVENING     Allergies:   Allergies   Allergen Reactions    Lanolin Rash          Pcn [Penicillins] Rash     \"Family is allergic\"    Sulfa Drugs Rash                 *ASSESSMENT:   1. Psychosis, unspecified psychosis type (HCC)    2. Closed nondisplaced fracture of metatarsal bone of right foot, unspecified metatarsal, initial encounter    3. Alcohol withdrawal syndrome without complication (HCC)       ETOH use d/o, severe  ETOH withdrawal with delirium  Hx MDD, possible psychotic fx  Hx PTSD    Medical: per tx team  Closed fracture of R foot   QTc 457 2/9/23    *RECOMMENDATIONS:  Legal Status: on legal hold    Please transfer patient to inpatient psychiatric hospital when medically cleared and bed is available.    Observation status:   -line of site with sitter    Visitors: No   Personal belongings: No     Discussed/voalted: RN    Medication Recommendations: Final orders as per Treatment Team  No med changes, pt very somnolent still, will reassess tomorrow for decreasing olanzapine should somnolence continue  Risks/benefits/side effects discussed, patient verbalized understanding  Medication reconciliation was completed.  Reviewed safety plan: 911, ER, PCM, MHC, suicide crisis line, nursing staff while inpatient.    Per psych note 2/9  \"  No medications will be initiated at this time until patient is no longer in withdrawal and able to participate in interview.  Medical team has initiated Zyprexa Zydis 10 mg nightly.  Will consider/re-evaluate reinitiation of home medications when patient has stabilized, less altered and is better able to participate in interview.  \"    Will Continue to Follow. Thank you for the consult.         Discharge recommendations:   -inpt psych  -Please assist with outpatient psychiatric/substance use follow up appointments at discharge once medically cleared.      "

## 2023-02-11 PROBLEM — J02.9 SORE THROAT: Status: ACTIVE | Noted: 2023-02-11

## 2023-02-11 PROBLEM — T14.91XA SUICIDE ATTEMPT (HCC): Status: ACTIVE | Noted: 2023-02-11

## 2023-02-11 LAB
ALBUMIN SERPL BCP-MCNC: 3.7 G/DL (ref 3.2–4.9)
ALBUMIN/GLOB SERPL: 1.4 G/DL
ALP SERPL-CCNC: 81 U/L (ref 30–99)
ALT SERPL-CCNC: 43 U/L (ref 2–50)
ANION GAP SERPL CALC-SCNC: 7 MMOL/L (ref 7–16)
AST SERPL-CCNC: 71 U/L (ref 12–45)
BASOPHILS # BLD AUTO: 0.6 % (ref 0–1.8)
BASOPHILS # BLD: 0.03 K/UL (ref 0–0.12)
BILIRUB SERPL-MCNC: 0.8 MG/DL (ref 0.1–1.5)
BUN SERPL-MCNC: 15 MG/DL (ref 8–22)
CALCIUM ALBUM COR SERPL-MCNC: 9 MG/DL (ref 8.5–10.5)
CALCIUM SERPL-MCNC: 8.8 MG/DL (ref 8.5–10.5)
CHLORIDE SERPL-SCNC: 100 MMOL/L (ref 96–112)
CO2 SERPL-SCNC: 24 MMOL/L (ref 20–33)
CREAT SERPL-MCNC: 0.69 MG/DL (ref 0.5–1.4)
EOSINOPHIL # BLD AUTO: 0.14 K/UL (ref 0–0.51)
EOSINOPHIL NFR BLD: 2.6 % (ref 0–6.9)
ERYTHROCYTE [DISTWIDTH] IN BLOOD BY AUTOMATED COUNT: 48.1 FL (ref 35.9–50)
GFR SERPLBLD CREATININE-BSD FMLA CKD-EPI: 125 ML/MIN/1.73 M 2
GLOBULIN SER CALC-MCNC: 2.6 G/DL (ref 1.9–3.5)
GLUCOSE SERPL-MCNC: 107 MG/DL (ref 65–99)
HCT VFR BLD AUTO: 34.3 % (ref 37–47)
HGB BLD-MCNC: 11.7 G/DL (ref 12–16)
IMM GRANULOCYTES # BLD AUTO: 0.06 K/UL (ref 0–0.11)
IMM GRANULOCYTES NFR BLD AUTO: 1.1 % (ref 0–0.9)
LYMPHOCYTES # BLD AUTO: 2.54 K/UL (ref 1–4.8)
LYMPHOCYTES NFR BLD: 47.2 % (ref 22–41)
MAGNESIUM SERPL-MCNC: 1.9 MG/DL (ref 1.5–2.5)
MCH RBC QN AUTO: 29 PG (ref 27–33)
MCHC RBC AUTO-ENTMCNC: 34.1 G/DL (ref 33.6–35)
MCV RBC AUTO: 84.9 FL (ref 81.4–97.8)
MONOCYTES # BLD AUTO: 0.27 K/UL (ref 0–0.85)
MONOCYTES NFR BLD AUTO: 5 % (ref 0–13.4)
NEUTROPHILS # BLD AUTO: 2.34 K/UL (ref 2–7.15)
NEUTROPHILS NFR BLD: 43.5 % (ref 44–72)
NRBC # BLD AUTO: 0 K/UL
NRBC BLD-RTO: 0 /100 WBC
PHOSPHATE SERPL-MCNC: 3.3 MG/DL (ref 2.5–4.5)
PLATELET # BLD AUTO: 121 K/UL (ref 164–446)
PMV BLD AUTO: 9.4 FL (ref 9–12.9)
POTASSIUM SERPL-SCNC: 3.7 MMOL/L (ref 3.6–5.5)
PROT SERPL-MCNC: 6.3 G/DL (ref 6–8.2)
RBC # BLD AUTO: 4.04 M/UL (ref 4.2–5.4)
SODIUM SERPL-SCNC: 131 MMOL/L (ref 135–145)
WBC # BLD AUTO: 5.4 K/UL (ref 4.8–10.8)

## 2023-02-11 PROCEDURE — 84100 ASSAY OF PHOSPHORUS: CPT

## 2023-02-11 PROCEDURE — A9270 NON-COVERED ITEM OR SERVICE: HCPCS | Performed by: HOSPITALIST

## 2023-02-11 PROCEDURE — 700102 HCHG RX REV CODE 250 W/ 637 OVERRIDE(OP): Performed by: HOSPITALIST

## 2023-02-11 PROCEDURE — 80053 COMPREHEN METABOLIC PANEL: CPT

## 2023-02-11 PROCEDURE — 700102 HCHG RX REV CODE 250 W/ 637 OVERRIDE(OP): Performed by: EMERGENCY MEDICINE

## 2023-02-11 PROCEDURE — A9270 NON-COVERED ITEM OR SERVICE: HCPCS | Performed by: STUDENT IN AN ORGANIZED HEALTH CARE EDUCATION/TRAINING PROGRAM

## 2023-02-11 PROCEDURE — 700105 HCHG RX REV CODE 258: Performed by: HOSPITALIST

## 2023-02-11 PROCEDURE — 700111 HCHG RX REV CODE 636 W/ 250 OVERRIDE (IP): Performed by: HOSPITALIST

## 2023-02-11 PROCEDURE — 700111 HCHG RX REV CODE 636 W/ 250 OVERRIDE (IP): Performed by: INTERNAL MEDICINE

## 2023-02-11 PROCEDURE — 99233 SBSQ HOSP IP/OBS HIGH 50: CPT | Performed by: HOSPITALIST

## 2023-02-11 PROCEDURE — 83735 ASSAY OF MAGNESIUM: CPT

## 2023-02-11 PROCEDURE — 770001 HCHG ROOM/CARE - MED/SURG/GYN PRIV*

## 2023-02-11 PROCEDURE — 700102 HCHG RX REV CODE 250 W/ 637 OVERRIDE(OP): Performed by: STUDENT IN AN ORGANIZED HEALTH CARE EDUCATION/TRAINING PROGRAM

## 2023-02-11 PROCEDURE — 700111 HCHG RX REV CODE 636 W/ 250 OVERRIDE (IP): Performed by: STUDENT IN AN ORGANIZED HEALTH CARE EDUCATION/TRAINING PROGRAM

## 2023-02-11 PROCEDURE — A9270 NON-COVERED ITEM OR SERVICE: HCPCS | Performed by: EMERGENCY MEDICINE

## 2023-02-11 PROCEDURE — 85025 COMPLETE CBC W/AUTO DIFF WBC: CPT

## 2023-02-11 RX ORDER — MAGNESIUM SULFATE HEPTAHYDRATE 40 MG/ML
2 INJECTION, SOLUTION INTRAVENOUS ONCE
Status: COMPLETED | OUTPATIENT
Start: 2023-02-11 | End: 2023-02-11

## 2023-02-11 RX ORDER — OMEPRAZOLE 20 MG/1
20 CAPSULE, DELAYED RELEASE ORAL DAILY
Status: DISCONTINUED | OUTPATIENT
Start: 2023-02-11 | End: 2023-02-15 | Stop reason: HOSPADM

## 2023-02-11 RX ORDER — POTASSIUM CHLORIDE 20 MEQ/1
40 TABLET, EXTENDED RELEASE ORAL ONCE
Status: COMPLETED | OUTPATIENT
Start: 2023-02-11 | End: 2023-02-11

## 2023-02-11 RX ORDER — SODIUM CHLORIDE 9 MG/ML
INJECTION, SOLUTION INTRAVENOUS CONTINUOUS
Status: ACTIVE | OUTPATIENT
Start: 2023-02-11 | End: 2023-02-12

## 2023-02-11 RX ORDER — CALCIUM CARBONATE-CHOLECALCIFEROL TAB 250 MG-125 UNIT 250-125 MG-UNIT
1 TAB ORAL 3 TIMES DAILY
Status: DISCONTINUED | OUTPATIENT
Start: 2023-02-11 | End: 2023-02-15 | Stop reason: HOSPADM

## 2023-02-11 RX ADMIN — LORAZEPAM 2 MG: 2 INJECTION INTRAMUSCULAR; INTRAVENOUS at 22:05

## 2023-02-11 RX ADMIN — LORAZEPAM 2 MG: 2 INJECTION INTRAMUSCULAR; INTRAVENOUS at 11:03

## 2023-02-11 RX ADMIN — FOLIC ACID 1 MG: 1 TABLET ORAL at 05:41

## 2023-02-11 RX ADMIN — LORAZEPAM 2 MG: 2 INJECTION INTRAMUSCULAR; INTRAVENOUS at 13:24

## 2023-02-11 RX ADMIN — LORAZEPAM 2 MG: 2 INJECTION INTRAMUSCULAR; INTRAVENOUS at 01:00

## 2023-02-11 RX ADMIN — LORAZEPAM 2 MG: 2 INJECTION INTRAMUSCULAR; INTRAVENOUS at 19:20

## 2023-02-11 RX ADMIN — LORAZEPAM 2 MG: 2 INJECTION INTRAMUSCULAR; INTRAVENOUS at 05:40

## 2023-02-11 RX ADMIN — OXYCODONE HYDROCHLORIDE 5 MG: 5 TABLET ORAL at 11:50

## 2023-02-11 RX ADMIN — NICOTINE TRANSDERMAL SYSTEM 21 MG: 21 PATCH, EXTENDED RELEASE TRANSDERMAL at 05:41

## 2023-02-11 RX ADMIN — OLANZAPINE 10 MG: 5 TABLET, ORALLY DISINTEGRATING ORAL at 18:15

## 2023-02-11 RX ADMIN — THERA TABS 1 TABLET: TAB at 05:41

## 2023-02-11 RX ADMIN — OMEPRAZOLE 20 MG: 20 CAPSULE, DELAYED RELEASE ORAL at 17:40

## 2023-02-11 RX ADMIN — SODIUM CHLORIDE: 9 INJECTION, SOLUTION INTRAVENOUS at 12:48

## 2023-02-11 RX ADMIN — OXYCODONE HYDROCHLORIDE 5 MG: 5 TABLET ORAL at 21:19

## 2023-02-11 RX ADMIN — OXYCODONE HYDROCHLORIDE 5 MG: 5 TABLET ORAL at 00:08

## 2023-02-11 RX ADMIN — MAGNESIUM SULFATE HEPTAHYDRATE 2 G: 40 INJECTION, SOLUTION INTRAVENOUS at 12:50

## 2023-02-11 RX ADMIN — THIAMINE HCL TAB 100 MG 100 MG: 100 TAB at 05:41

## 2023-02-11 RX ADMIN — RIVAROXABAN 10 MG: 10 TABLET, FILM COATED ORAL at 17:39

## 2023-02-11 RX ADMIN — LORAZEPAM 2 MG: 2 INJECTION INTRAMUSCULAR; INTRAVENOUS at 21:06

## 2023-02-11 RX ADMIN — LORAZEPAM 2 MG: 2 INJECTION INTRAMUSCULAR; INTRAVENOUS at 17:40

## 2023-02-11 RX ADMIN — MORPHINE SULFATE 2 MG: 4 INJECTION, SOLUTION INTRAMUSCULAR; INTRAVENOUS at 22:55

## 2023-02-11 RX ADMIN — POTASSIUM CHLORIDE 40 MEQ: 1500 TABLET, EXTENDED RELEASE ORAL at 12:44

## 2023-02-11 RX ADMIN — Medication 1 TABLET: at 17:40

## 2023-02-11 RX ADMIN — OXYCODONE HYDROCHLORIDE 5 MG: 5 TABLET ORAL at 08:02

## 2023-02-11 RX ADMIN — FAMOTIDINE 20 MG: 10 INJECTION, SOLUTION INTRAVENOUS at 05:41

## 2023-02-11 ASSESSMENT — ENCOUNTER SYMPTOMS
ABDOMINAL PAIN: 0
DIARRHEA: 0
BACK PAIN: 0
COUGH: 0
VOMITING: 0
HEADACHES: 0
SINUS PAIN: 0
PALPITATIONS: 0
NAUSEA: 0
FEVER: 0
SORE THROAT: 1
SHORTNESS OF BREATH: 0
SPEECH CHANGE: 0
NERVOUS/ANXIOUS: 0
DIZZINESS: 0
STRIDOR: 0
EYE DISCHARGE: 0
DEPRESSION: 1
HALLUCINATIONS: 1

## 2023-02-11 ASSESSMENT — LIFESTYLE VARIABLES
HEADACHE, FULLNESS IN HEAD: VERY MILD
TOTAL SCORE: VERY MILD ITCHING, PINS AND NEEDLES SENSATION, BURNING OR NUMBNESS
TOTAL SCORE: 13
PAROXYSMAL SWEATS: NO SWEAT VISIBLE
TOTAL SCORE: VERY MILD ITCHING, PINS AND NEEDLES SENSATION, BURNING OR NUMBNESS
TREMOR: MODERATE TREMOR WITH ARMS EXTENDED
TOTAL SCORE: 9
VISUAL DISTURBANCES: VERY MILD SENSITIVITY
TOTAL SCORE: 9
NAUSEA AND VOMITING: NO NAUSEA AND NO VOMITING
VISUAL DISTURBANCES: VERY MILD SENSITIVITY
ANXIETY: NO ANXIETY (AT EASE)
NAUSEA AND VOMITING: NO NAUSEA AND NO VOMITING
PAROXYSMAL SWEATS: NO SWEAT VISIBLE
AUDITORY DISTURBANCES: VERY MILD HARSHNESS OR ABILITY TO FRIGHTEN
AGITATION: NORMAL ACTIVITY
AGITATION: NORMAL ACTIVITY
ANXIETY: NO ANXIETY (AT EASE)
SUBSTANCE_ABUSE: 1
ORIENTATION AND CLOUDING OF SENSORIUM: ORIENTED AND CAN DO SERIAL ADDITIONS
NAUSEA AND VOMITING: MILD NAUSEA WITH NO VOMITING
TREMOR: MODERATE TREMOR WITH ARMS EXTENDED
PAROXYSMAL SWEATS: NO SWEAT VISIBLE
VISUAL DISTURBANCES: VERY MILD SENSITIVITY
AUDITORY DISTURBANCES: VERY MILD HARSHNESS OR ABILITY TO FRIGHTEN
TOTAL SCORE: VERY MILD ITCHING, PINS AND NEEDLES SENSATION, BURNING OR NUMBNESS
HEADACHE, FULLNESS IN HEAD: MILD
AGITATION: SOMEWHAT MORE THAN NORMAL ACTIVITY
AUDITORY DISTURBANCES: VERY MILD HARSHNESS OR ABILITY TO FRIGHTEN
HEADACHE, FULLNESS IN HEAD: SEVERE
TREMOR: MODERATE TREMOR WITH ARMS EXTENDED
ORIENTATION AND CLOUDING OF SENSORIUM: ORIENTED AND CAN DO SERIAL ADDITIONS
ORIENTATION AND CLOUDING OF SENSORIUM: ORIENTED AND CAN DO SERIAL ADDITIONS
ANXIETY: NO ANXIETY (AT EASE)

## 2023-02-11 ASSESSMENT — PAIN DESCRIPTION - PAIN TYPE
TYPE: ACUTE PAIN

## 2023-02-11 NOTE — CARE PLAN
The patient is Watcher - Medium risk of patient condition declining or worsening    Shift Goals  Clinical Goals: monitor CIWA  Patient Goals: Rest  Family Goals: XUAN    Progress made toward(s) clinical / shift goals:      Problem: Knowledge Deficit - Standard  Goal: Patient and family/care givers will demonstrate understanding of plan of care, disease process/condition, diagnostic tests and medications  Outcome: Progressing     Problem: Optimal Care for Alcohol Withdrawal  Goal: Optimal Care for the alcohol withdrawal patient  Outcome: Progressing     Problem: Seizure Precautions  Goal: Implementation of seizure precautions  Outcome: Progressing     Problem: Psychosocial  Goal: Patient's level of anxiety will decrease  Outcome: Progressing     Problem: Risk for Aspiration  Goal: Patient's risk for aspiration will be absent or decrease  Outcome: Progressing     Problem: Pain - Standard  Goal: Alleviation of pain or a reduction in pain to the patient’s comfort goal  Outcome: Progressing     Problem: Provide Safe Environment  Goal: Suicide environmental safety, protocols, policies, and practices will be implemented  Outcome: Progressing     Problem: Safety - Medical Restraint  Goal: Remains free of injury from restraints (Restraint for Interference with Medical Device)  Outcome: Met  Goal: Free from restraint(s) (Restraint for Interference with Medical Device)  Outcome: Met     Problem: Skin Integrity  Goal: Skin integrity is maintained or improved  Outcome: Progressing     Problem: Fall Risk  Goal: Patient will remain free from falls  Outcome: Progressing

## 2023-02-11 NOTE — CONSULTS
"  [facility] PSYCHIATRIC FOLLOW-UP:(established)    DOS: 02/11/23     Reason for admission:  \"Patient was brought in by EMS, activated by her , due to suicidal ideations, attempting to crawl and jump out of the window.  During rounds, patient is delirious, unable to fully participate in evaluation.  She is currently going through alcohol withdrawal.  Patient has a history of mental illness including MDD, PTSD and alcohol dependence.  It appears that she was noncompliant with the Geodon for the past few days.  Patient has 2 previous suicide attempts.  Patient at this time continues to have acute elevated risk for danger to himself, therefore I am extending legal hold for further psychiatric stabilization once her alcohol withdrawal/delirium resolves.\"  Legal Hold Status: on legal hold      ID/Chief Complaint:    Chief Complaint   Patient presents with    Legal 2000     BIB MS from home after pts  called PD because pt was attempting to crawl out a window and stated she wanted to die.              Chart review - see excellent intake 2/9/23 for further psych background. Transferred to ICU d/t severity of withdrawals.     S:  Seen today as f/u psych consult. Patient more awake and engaged today. Says that her mood is \"drowsy/depressed/anxious\". Would like to have her  visit. Sleep intact/increased. Appetite intact. Denies current SI/HI, A/VH to this writer, however did endorse to primary MD this morning.     Would like to restart her psych meds, feels that they were beneficial for her mood/stability.       Spoke with her , Ki, over the phone ([phone]) - notes that he has been working with the mental health crisis team since this event - he will be going to the [facility] on Monday to work on getting pt set up with outpatient services. Plan should pt be discharged would be to make sure there is childcare to take stress off of pt, and he would be with her " "24/7 (his job allows for this amount of flexibility). He thinks that she may be safe enough to discharge straight home rather than to inpt psych given the resources lined up. Would like to be involved in care, says he can be easily reached if needed.       O: Medical ROS (as pertinent): No new changes reported to this writer.    Recent Labs     02/09/23  0120 02/10/23  0520 02/11/23  0424   WBC 7.5 4.1* 5.4   RBC 4.17* 3.94* 4.04*   HEMOGLOBIN 12.0 11.5* 11.7*   HEMATOCRIT 34.4* 34.5* 34.3*   MCV 82.5 87.6 84.9   MCH 28.8 29.2 29.0   RDW 47.7 49.5 48.1   PLATELETCT 148* 96* 121*   MPV 9.1 9.5 9.4   NEUTSPOLYS 76.60* 51.50 43.50*   LYMPHOCYTES 17.80* 38.10 47.20*   MONOCYTES 4.70 7.30 5.00   EOSINOPHILS 0.10 1.70 2.60   BASOPHILS 0.40 0.70 0.60       Recent Labs     02/09/23  0120 02/10/23  0520 02/11/23  0424   SODIUM 132* 133* 131*   POTASSIUM 3.6 3.7 3.7   CHLORIDE 92* 99 100   CO2 20 25 24   GLUCOSE 93 81 107*   BUN 6* 12 15       Recent Labs     02/09/23  0120 02/10/23  0520 02/11/23  0424   ALBUMIN  --  3.7 3.7   TBILIRUBIN  --  1.6* 0.8   ALKPHOSPHAT  --  82 81   TOTPROTEIN  --  5.9* 6.3   ALTSGPT  --  48 43   ASTSGOT  --  111* 71*   CREATININE 0.56 0.91 0.69           PSYCHIATRIC EXAM (MSE):   /76   Pulse (!) 129   Temp 36.8 °C (98.2 °F) (Temporal)   Resp (!) 21   Ht 1.727 m (5' 8\")   Wt 82.5 kg (181 lb 14.1 oz)   SpO2 97%     Constitutional: as noted above  General Appearance/Behavior: fair eye contact.  Calm, cooperative and friendly with interviewer, no behavioral disturbances.  Abnormal Movements: none - no PMA/PMR or tremor observed.  Gait and Posture: gait not observed.  Musculoskeletal: as noted above  Mood: \"drowsy/depressed/anxious\"  Affect: Mood/Congruent   Speech: normal rate/rhythm/tone/volume/fluency  Language:  spontaneous, comprehends spoken commands, fluent.  Thought Process: linear, logical  Thought Content: Denies SI/HI. Denies A/VH, no e/o delusions, PI, or internal " "preoccupation.   Insight/Judgement:  fair - improving  Alert/Orientation: groggy, oriented to person, place and time  Attn/Concentration: normal  Fund of Knowledge: not tested  Memory recent/remote: No gross evidence of memory deficits  MMSE: deferred this visit         Meds Current:  Scheduled Medications   Medication Dose Frequency    rivaroxaban  10 mg DAILY AT 1800    thiamine  100 mg DAILY    And    multivitamin  1 Tablet DAILY    And    folic acid  1 mg DAILY    famotidine  20 mg BID    senna-docusate  2 Tablet BID    nicotine  21 mg Daily-0600    Pharmacy Consult Request  1 Each PHARMACY TO DOSE    LORazepam  2 mg Q4HRS    MD Alert...Adult ICU Electrolyte Replacement per Pharmacy   PHARMACY TO DOSE    OLANZapine zydis  10 mg Q EVENING     Allergies:   Allergies   Allergen Reactions    Lanolin Rash          Pcn [Penicillins] Rash     \"Family is allergic\"    Sulfa Drugs Rash                 *ASSESSMENT:   1. Psychosis, unspecified psychosis type (HCC)    2. Closed nondisplaced fracture of metatarsal bone of right foot, unspecified metatarsal, initial encounter    3. Alcohol withdrawal syndrome without complication (HCC)       ETOH use d/o, severe  ETOH withdrawal with delirium  Hx MDD, possible psychotic fx  Hx PTSD    Medical: per tx team  Closed fracture of R foot   QTc 457 2/9/23  hyponatremia    *RECOMMENDATIONS:  Legal Status: on legal hold    Please transfer patient to inpatient psychiatric hospital when medically cleared and bed is available.    Observation status:   -line of site with sitter    Visitors: Yes - for now just her  Ki and her friend Adeline who works at [employer].   Personal belongings: No     Discussed/voalted: RN and Dr. Beavers    Medication Recommendations: Final orders as per Treatment Team  Would recommend the following medication changes:  D/c olanzapine or decrease dose to 5mg QHS given continued sedation. Psychosis appears more c/w ETOH delirium rather than primary psychotic " "d/o at this time  Restart lexapro 10mg daily for mood (close monitoring of Na levels as this can contribute to hyponatremia)  Restart lamotrigine - 25mg daily for mood  Risks/benefits/side effects discussed, patient verbalized understanding  Medication reconciliation was completed.  Reviewed safety plan: 911, ER, PCM, MHC, suicide crisis line, nursing staff while inpatient.    Per psych note 2/9  \"  No medications will be initiated at this time until patient is no longer in withdrawal and able to participate in interview.  Medical team has initiated Zyprexa Zydis 10 mg nightly.  Will consider/re-evaluate reinitiation of home medications when patient has stabilized, less altered and is better able to participate in interview.  \"    Will Continue to Follow. Thank you for the consult.         Discharge recommendations:   -inpt psych  -Please assist with outpatient psychiatric/substance use follow up appointments at discharge once medically cleared.      "

## 2023-02-11 NOTE — CARE PLAN
The patient is Watcher - Medium risk of patient condition declining or worsening    Shift Goals  Clinical Goals: pain relief, CIWA  Patient Goals: Pain relief  Family Goals: XUAN    Progress made toward(s) clinical / shift goals:    Problem: Knowledge Deficit - Standard  Goal: Patient and family/care givers will demonstrate understanding of plan of care, disease process/condition, diagnostic tests and medications  Outcome: Progressing     Problem: Optimal Care for Alcohol Withdrawal  Goal: Optimal Care for the alcohol withdrawal patient  Outcome: Progressing     Problem: Seizure Precautions  Goal: Implementation of seizure precautions  Outcome: Progressing     Problem: Lifestyle Changes  Goal: Patient's ability to identify lifestyle changes and available resources to help reduce recurrence of condition will improve  Outcome: Progressing     Problem: Psychosocial  Goal: Patient's level of anxiety will decrease  Outcome: Progressing  Goal: Spiritual and cultural needs incorporated into hospitalization  Outcome: Progressing     Problem: Risk for Aspiration  Goal: Patient's risk for aspiration will be absent or decrease  Outcome: Progressing     Problem: Provide Safe Environment  Goal: Suicide environmental safety, protocols, policies, and practices will be implemented  Outcome: Progressing     Problem: Psychosocial  Goal: Patient's ability to identify and develop effective coping behaviors will improve  Outcome: Progressing  Goal: Patient's ability to identify and utilize available support systems will improve  Outcome: Progressing     Problem: Pain - Standard  Goal: Alleviation of pain or a reduction in pain to the patient’s comfort goal  Outcome: Progressing     Problem: Skin Integrity  Goal: Skin integrity is maintained or improved  Outcome: Progressing     Problem: Fall Risk  Goal: Patient will remain free from falls  Outcome: Progressing       Patient is not progressing towards the following goals:

## 2023-02-11 NOTE — PROGRESS NOTES
Hospital Medicine Daily Progress Note    Date of Service  2/11/2023    Chief Complaint  Suicide attempt, attempt to crawl out a window.    Hospital Course  Jessie Carney is a 22 y.o. female wit alcohol abuse, MDD, PTSD admitted 2/8/2023 with suicide attempt and hallucinations.  She was admitted for psychosis and SI. During her admit there were concerns of EtOH withdrawal and patient was transferred to [facility] for higher level of care and Precedex drip.    Interval Problem Update  2/11: sedate on Precedex drip but awakens and slowly follows commands.  States a sore throat.  States she is having audible and visual hallucinations.  Tearful at times stating she has suicidal thoughts.    I have discussed this patient's plan of care and discharge plan at IDT rounds today with Case Management, Nursing, Nursing leadership, and other members of the IDT team.    Consultants/Specialty  psychiatry    Code Status  Full Code    Disposition  Patient is not medically cleared for discharge.   Anticipate discharge to to home with close outpatient follow-up.  I have placed the appropriate orders for post-discharge needs.    Review of Systems  Review of Systems   Constitutional:  Positive for malaise/fatigue. Negative for fever.   HENT:  Positive for sore throat. Negative for sinus pain.    Eyes:  Negative for discharge.   Respiratory:  Negative for cough, shortness of breath and stridor.    Cardiovascular:  Negative for chest pain, palpitations and leg swelling.   Gastrointestinal:  Negative for abdominal pain, diarrhea, nausea and vomiting.   Genitourinary:  Negative for dysuria and hematuria.   Musculoskeletal:  Negative for back pain and joint pain.   Skin:  Negative for rash.   Neurological:  Negative for dizziness, speech change and headaches.   Psychiatric/Behavioral:  Positive for depression, hallucinations, substance abuse and suicidal ideas. The patient is not nervous/anxious.       Physical Exam  Temp:  [36.8 °C (98.2 °F)]  36.8 °C (98.2 °F)  Pulse:  [] 131  Resp:  [8-31] 27  BP: (100-153)/() 107/77  SpO2:  [93 %-99 %] 98 %    Physical Exam  Vitals reviewed.   Constitutional:       Appearance: Normal appearance. She is overweight. She is not diaphoretic.      Interventions: She is sedated and restrained.   HENT:      Head: Normocephalic and atraumatic.      Nose: Nose normal.      Mouth/Throat:      Mouth: Mucous membranes are moist.      Pharynx: No oropharyngeal exudate.   Eyes:      General: No scleral icterus.        Right eye: No discharge.         Left eye: No discharge.      Extraocular Movements: Extraocular movements intact.      Conjunctiva/sclera: Conjunctivae normal.   Cardiovascular:      Rate and Rhythm: Normal rate and regular rhythm.      Pulses:           Radial pulses are 2+ on the right side and 2+ on the left side.        Dorsalis pedis pulses are 2+ on the right side and 2+ on the left side.      Heart sounds: No murmur heard.  Pulmonary:      Effort: Pulmonary effort is normal. No respiratory distress.      Breath sounds: Normal breath sounds. No wheezing or rales.   Abdominal:      General: Bowel sounds are normal. There is no distension.      Palpations: Abdomen is soft.      Tenderness: There is no abdominal tenderness.   Musculoskeletal:         General: No swelling or tenderness.      Cervical back: Neck supple. No tenderness. No muscular tenderness.      Right lower leg: No edema.      Left lower leg: No edema.   Lymphadenopathy:      Cervical: No cervical adenopathy.   Skin:     Coloration: Skin is not jaundiced or pale.      Findings: Bruising (right foot and lower leg) present.   Neurological:      General: No focal deficit present.      Mental Status: She is lethargic.      Cranial Nerves: No cranial nerve deficit.   Psychiatric:         Attention and Perception: She perceives auditory and visual hallucinations.         Mood and Affect: Mood is depressed. Affect is tearful.         Speech:  Speech is delayed.         Behavior: Behavior is slowed.         Thought Content: Thought content includes suicidal ideation.       Fluids    Intake/Output Summary (Last 24 hours) at 2/11/2023 1217  Last data filed at 2/11/2023 1129  Gross per 24 hour   Intake 914.8 ml   Output 1400 ml   Net -485.2 ml       Laboratory  Recent Labs     02/09/23  0120 02/10/23  0520 02/11/23  0424   WBC 7.5 4.1* 5.4   RBC 4.17* 3.94* 4.04*   HEMOGLOBIN 12.0 11.5* 11.7*   HEMATOCRIT 34.4* 34.5* 34.3*   MCV 82.5 87.6 84.9   MCH 28.8 29.2 29.0   MCHC 34.9 33.3* 34.1   RDW 47.7 49.5 48.1   PLATELETCT 148* 96* 121*   MPV 9.1 9.5 9.4     Recent Labs     02/09/23  0120 02/10/23  0520 02/11/23  0424   SODIUM 132* 133* 131*   POTASSIUM 3.6 3.7 3.7   CHLORIDE 92* 99 100   CO2 20 25 24   GLUCOSE 93 81 107*   BUN 6* 12 15   CREATININE 0.56 0.91 0.69   CALCIUM 8.8 8.7 8.8                   Imaging  DX-FOOT-COMPLETE 3+ RIGHT   Final Result         1.  Small bony fragment at the dorsal base of the metatarsals on lateral view, appearance suggests small ligamentous avulsion fracture fragment, although it is uncertain which metatarsal.      DX-ANKLE 3+ VIEWS RIGHT   Final Result         1.  No acute traumatic bony injury.              Assessment/Plan  * Alcohol withdrawal delirium, acute, hyperactive (HCC)- (present on admission)  Assessment & Plan  2/11 stopping Precedex drip  CIWA protocol  If stable transfer to medical floor with need of SI/Legal hold  Thiamine and MVI.  Once more awake ongoing discussion for EtOH sobriety      Sore throat  Assessment & Plan  Likley GERD/refllux.  Supportive care.    Hyponatremia  Assessment & Plan  Appears euvolemic  IV fluids  Monitor BMP  2/10 Na:133  2/11 Na:131    Elevated liver enzymes  Assessment & Plan  EtOH use likely.  2/11 AST:71 <111  Possible component of hepatosteatosis   Monitor cmp    Cannabis use disorder  Assessment & Plan  Positive on drug screen    Overweight  Assessment & Plan  Body mass index  is 27.65 kg/m².  Encourage active and healthy lifestyle choices once more alert    Closed avulsion fracture of metatarsal bone of right foot  Assessment & Plan  Noted on Xray right foot  Boot in place  Supportive pain control  2/11 starting calcium and vitamin D    Intractable nausea and vomiting  Assessment & Plan  IVF  Supportive anti-emetic prn  pepcid    Dehydration  Assessment & Plan  IVF    Suicidal ideation  Assessment & Plan  Under legal hold with sitter in room.  Continue Zyprexa  Psych consulting.         VTE prophylaxis: SCDs/TEDs    I have performed a physical exam and reviewed and updated ROS and Plan today (2/11/2023). In review of yesterday's note (2/10/2023), there are no changes except as documented above.

## 2023-02-12 PROCEDURE — A9270 NON-COVERED ITEM OR SERVICE: HCPCS | Performed by: HOSPITALIST

## 2023-02-12 PROCEDURE — 700105 HCHG RX REV CODE 258: Performed by: INTERNAL MEDICINE

## 2023-02-12 PROCEDURE — 700111 HCHG RX REV CODE 636 W/ 250 OVERRIDE (IP): Performed by: STUDENT IN AN ORGANIZED HEALTH CARE EDUCATION/TRAINING PROGRAM

## 2023-02-12 PROCEDURE — 700102 HCHG RX REV CODE 250 W/ 637 OVERRIDE(OP): Performed by: HOSPITALIST

## 2023-02-12 PROCEDURE — 700111 HCHG RX REV CODE 636 W/ 250 OVERRIDE (IP): Performed by: INTERNAL MEDICINE

## 2023-02-12 PROCEDURE — A9270 NON-COVERED ITEM OR SERVICE: HCPCS | Performed by: STUDENT IN AN ORGANIZED HEALTH CARE EDUCATION/TRAINING PROGRAM

## 2023-02-12 PROCEDURE — 700102 HCHG RX REV CODE 250 W/ 637 OVERRIDE(OP): Performed by: STUDENT IN AN ORGANIZED HEALTH CARE EDUCATION/TRAINING PROGRAM

## 2023-02-12 PROCEDURE — 770001 HCHG ROOM/CARE - MED/SURG/GYN PRIV*

## 2023-02-12 PROCEDURE — 700101 HCHG RX REV CODE 250: Performed by: INTERNAL MEDICINE

## 2023-02-12 PROCEDURE — 99233 SBSQ HOSP IP/OBS HIGH 50: CPT | Performed by: HOSPITALIST

## 2023-02-12 RX ORDER — ESCITALOPRAM OXALATE 10 MG/1
10 TABLET ORAL DAILY
Status: DISCONTINUED | OUTPATIENT
Start: 2023-02-12 | End: 2023-02-15 | Stop reason: HOSPADM

## 2023-02-12 RX ORDER — POTASSIUM CHLORIDE 20 MEQ/1
40 TABLET, EXTENDED RELEASE ORAL ONCE
Status: DISCONTINUED | OUTPATIENT
Start: 2023-02-12 | End: 2023-02-12

## 2023-02-12 RX ORDER — HALOPERIDOL 5 MG/ML
1 INJECTION INTRAMUSCULAR ONCE
Status: COMPLETED | OUTPATIENT
Start: 2023-02-12 | End: 2023-02-12

## 2023-02-12 RX ORDER — QUETIAPINE FUMARATE 25 MG/1
25 TABLET, FILM COATED ORAL 2 TIMES DAILY
Status: DISCONTINUED | OUTPATIENT
Start: 2023-02-12 | End: 2023-02-14

## 2023-02-12 RX ORDER — MAGNESIUM SULFATE HEPTAHYDRATE 40 MG/ML
2 INJECTION, SOLUTION INTRAVENOUS ONCE
Status: DISCONTINUED | OUTPATIENT
Start: 2023-02-12 | End: 2023-02-12

## 2023-02-12 RX ADMIN — DEXMEDETOMIDINE 0.2 MCG/KG/HR: 200 INJECTION, SOLUTION INTRAVENOUS at 01:44

## 2023-02-12 RX ADMIN — LORAZEPAM 2 MG: 2 INJECTION INTRAMUSCULAR; INTRAVENOUS at 22:00

## 2023-02-12 RX ADMIN — LORAZEPAM 2 MG: 2 INJECTION INTRAMUSCULAR; INTRAVENOUS at 14:22

## 2023-02-12 RX ADMIN — OMEPRAZOLE 20 MG: 20 CAPSULE, DELAYED RELEASE ORAL at 05:09

## 2023-02-12 RX ADMIN — THIAMINE HCL TAB 100 MG 100 MG: 100 TAB at 05:10

## 2023-02-12 RX ADMIN — LORAZEPAM 2 MG: 2 INJECTION INTRAMUSCULAR; INTRAVENOUS at 00:30

## 2023-02-12 RX ADMIN — LORAZEPAM 1 MG: 2 INJECTION INTRAMUSCULAR; INTRAVENOUS at 19:37

## 2023-02-12 RX ADMIN — NICOTINE TRANSDERMAL SYSTEM 21 MG: 21 PATCH, EXTENDED RELEASE TRANSDERMAL at 05:09

## 2023-02-12 RX ADMIN — Medication 1 TABLET: at 17:36

## 2023-02-12 RX ADMIN — HALOPERIDOL LACTATE 1 MG: 5 INJECTION, SOLUTION INTRAMUSCULAR at 06:26

## 2023-02-12 RX ADMIN — LORAZEPAM 2 MG: 2 INJECTION INTRAMUSCULAR; INTRAVENOUS at 16:47

## 2023-02-12 RX ADMIN — RIVAROXABAN 10 MG: 10 TABLET, FILM COATED ORAL at 17:37

## 2023-02-12 RX ADMIN — QUETIAPINE FUMARATE 25 MG: 25 TABLET ORAL at 06:30

## 2023-02-12 RX ADMIN — THERA TABS 1 TABLET: TAB at 05:09

## 2023-02-12 RX ADMIN — OXYCODONE HYDROCHLORIDE 5 MG: 5 TABLET ORAL at 20:41

## 2023-02-12 RX ADMIN — ONDANSETRON 4 MG: 2 INJECTION INTRAMUSCULAR; INTRAVENOUS at 00:30

## 2023-02-12 RX ADMIN — OXYCODONE HYDROCHLORIDE 5 MG: 5 TABLET ORAL at 17:37

## 2023-02-12 RX ADMIN — DEXMEDETOMIDINE 1 MCG/KG/HR: 200 INJECTION, SOLUTION INTRAVENOUS at 08:09

## 2023-02-12 RX ADMIN — Medication 1 TABLET: at 05:09

## 2023-02-12 RX ADMIN — LORAZEPAM 2 MG: 2 INJECTION INTRAMUSCULAR; INTRAVENOUS at 10:57

## 2023-02-12 RX ADMIN — MORPHINE SULFATE 2 MG: 4 INJECTION, SOLUTION INTRAMUSCULAR; INTRAVENOUS at 21:50

## 2023-02-12 RX ADMIN — LORAZEPAM 2 MG: 2 INJECTION INTRAMUSCULAR; INTRAVENOUS at 02:05

## 2023-02-12 RX ADMIN — ONDANSETRON 4 MG: 2 INJECTION INTRAMUSCULAR; INTRAVENOUS at 16:18

## 2023-02-12 RX ADMIN — LORAZEPAM 2 MG: 2 INJECTION INTRAMUSCULAR; INTRAVENOUS at 05:09

## 2023-02-12 RX ADMIN — ESCITALOPRAM OXALATE 10 MG: 10 TABLET ORAL at 14:17

## 2023-02-12 RX ADMIN — QUETIAPINE FUMARATE 25 MG: 25 TABLET ORAL at 17:37

## 2023-02-12 RX ADMIN — MORPHINE SULFATE 2 MG: 4 INJECTION, SOLUTION INTRAMUSCULAR; INTRAVENOUS at 16:22

## 2023-02-12 RX ADMIN — FOLIC ACID 1 MG: 1 TABLET ORAL at 05:09

## 2023-02-12 ASSESSMENT — LIFESTYLE VARIABLES
TREMOR: MODERATE TREMOR WITH ARMS EXTENDED
TREMOR: MODERATE TREMOR WITH ARMS EXTENDED
AGITATION: *
ORIENTATION AND CLOUDING OF SENSORIUM: ORIENTED AND CAN DO SERIAL ADDITIONS
ORIENTATION AND CLOUDING OF SENSORIUM: ORIENTED AND CAN DO SERIAL ADDITIONS
AUDITORY DISTURBANCES: NOT PRESENT
PAROXYSMAL SWEATS: NO SWEAT VISIBLE
TOTAL SCORE: 6
HEADACHE, FULLNESS IN HEAD: NOT PRESENT
TREMOR: MODERATE TREMOR WITH ARMS EXTENDED
ANXIETY: MILDLY ANXIOUS
VISUAL DISTURBANCES: NOT PRESENT
AGITATION: NORMAL ACTIVITY
ANXIETY: MODERATELY ANXIOUS OR GUARDED, SO ANXIETY IS INFERRED
TOTAL SCORE: 7
VISUAL DISTURBANCES: NOT PRESENT
VISUAL DISTURBANCES: NOT PRESENT
PAROXYSMAL SWEATS: NO SWEAT VISIBLE
ORIENTATION AND CLOUDING OF SENSORIUM: ORIENTED AND CAN DO SERIAL ADDITIONS
ANXIETY: *
NAUSEA AND VOMITING: NO NAUSEA AND NO VOMITING
NAUSEA AND VOMITING: MILD NAUSEA WITH NO VOMITING
HEADACHE, FULLNESS IN HEAD: NOT PRESENT
AUDITORY DISTURBANCES: NOT PRESENT
AGITATION: NORMAL ACTIVITY
HEADACHE, FULLNESS IN HEAD: NOT PRESENT
NAUSEA AND VOMITING: NO NAUSEA AND NO VOMITING
PAROXYSMAL SWEATS: NO SWEAT VISIBLE
TOTAL SCORE: 9
AUDITORY DISTURBANCES: NOT PRESENT

## 2023-02-12 ASSESSMENT — PAIN DESCRIPTION - PAIN TYPE
TYPE: ACUTE PAIN

## 2023-02-12 ASSESSMENT — FIBROSIS 4 INDEX: FIB4 SCORE: 2.06

## 2023-02-12 NOTE — ASSESSMENT & PLAN NOTE
Psych consulting, pt is on legal hold   Continue lexapro and Seroquel   Suicide precautions  Pt is medically clear

## 2023-02-12 NOTE — CARE PLAN
The patient is Stable - Low risk of patient condition declining or worsening    Shift Goals  Clinical Goals: pain relief, CIWA  Patient Goals: Pain relief  Family Goals: XUAN    Progress made toward(s) clinical / shift goals:  CIWA 9-13, c/o R foot pain and throat pain/pain w/ swallowing, off precedex gtt,  at bedside, plan to transfer out of [facility], [place]        Problem: Knowledge Deficit - Standard  Goal: Patient and family/care givers will demonstrate understanding of plan of care, disease process/condition, diagnostic tests and medications  Outcome: Progressing     Problem: Optimal Care for Alcohol Withdrawal  Goal: Optimal Care for the alcohol withdrawal patient  Outcome: Progressing     Problem: Seizure Precautions  Goal: Implementation of seizure precautions  Outcome: Progressing     Problem: Lifestyle Changes  Goal: Patient's ability to identify lifestyle changes and available resources to help reduce recurrence of condition will improve  Outcome: Progressing     Problem: Psychosocial  Goal: Patient's level of anxiety will decrease  Outcome: Progressing  Goal: Spiritual and cultural needs incorporated into hospitalization  Outcome: Progressing     Problem: Provide Safe Environment  Goal: Suicide environmental safety, protocols, policies, and practices will be implemented  Outcome: Progressing     Problem: Psychosocial  Goal: Patient's ability to identify and develop effective coping behaviors will improve  Outcome: Progressing  Goal: Patient's ability to identify and utilize available support systems will improve  Outcome: Progressing     Problem: Pain - Standard  Goal: Alleviation of pain or a reduction in pain to the patient’s comfort goal  Outcome: Progressing     Problem: Skin Integrity  Goal: Skin integrity is maintained or improved  Outcome: Progressing     Problem: Fall Risk  Goal: Patient will remain free from falls  Outcome: Progressing

## 2023-02-12 NOTE — PROGRESS NOTES
Hospital Medicine Daily Progress Note    Date of Service  2/12/2023    Chief Complaint  Suicide attempt, attempt to crawl out a window.    Hospital Course  Jessie Carney is a 22 y.o. female wit alcohol abuse, MDD, PTSD admitted 2/8/2023 with suicide attempt and hallucinations.  She was admitted for psychosis and SI. During her admit there were concerns of EtOH withdrawal and patient was transferred to [facility] for higher level of care and Precedex drip.    Interval Problem Update  2/12: Returned to Precedex drip overnight per nursing.  Sleeping and did not want to respond to me this am.  Sitter at bedside.     2/11: sedate on Precedex drip but awakens and slowly follows commands.  States a sore throat.  States she is having audible and visual hallucinations.  Tearful at times stating she has suicidal thoughts.    I have discussed this patient's plan of care and discharge plan at IDT rounds today with Case Management, Nursing, Nursing leadership, and other members of the IDT team.    Consultants/Specialty  psychiatry    Code Status  Full Code    Disposition  Patient is not medically cleared for discharge.   Anticipate discharge to to home with close outpatient follow-up.  I have placed the appropriate orders for post-discharge needs.    Review of Systems  Review of Systems   Unable to perform ROS: Psychiatric disorder      Physical Exam  Temp:  [35.8 °C (96.4 °F)-36.9 °C (98.4 °F)] 35.8 °C (96.4 °F)  Pulse:  [] 94  Resp:  [10-41] 21  BP: ()/() 108/74  SpO2:  [94 %-99 %] 95 %    Physical Exam  Vitals reviewed.   Constitutional:       Appearance: Normal appearance. She is overweight. She is not ill-appearing or diaphoretic.      Interventions: She is sedated and restrained.   HENT:      Head: Normocephalic and atraumatic.      Nose: Nose normal.      Mouth/Throat:      Pharynx: No oropharyngeal exudate.   Eyes:      General:         Right eye: No discharge.         Left eye: No discharge.    Cardiovascular:      Rate and Rhythm: Normal rate and regular rhythm.      Pulses:           Radial pulses are 2+ on the right side and 2+ on the left side.        Dorsalis pedis pulses are 2+ on the right side and 2+ on the left side.      Heart sounds: No murmur heard.  Pulmonary:      Effort: Pulmonary effort is normal. No respiratory distress.      Breath sounds: Normal breath sounds. No wheezing or rales.   Abdominal:      General: Bowel sounds are normal. There is no distension.      Palpations: Abdomen is soft.      Tenderness: There is no abdominal tenderness.   Musculoskeletal:         General: No swelling or tenderness.      Cervical back: No muscular tenderness.      Right lower leg: No edema.      Left lower leg: No edema.   Skin:     Coloration: Skin is not jaundiced or pale.      Findings: Bruising (right foot and lower leg) present.   Neurological:      Mental Status: She is lethargic.   Psychiatric:         Mood and Affect: Affect is inappropriate.         Behavior: Behavior is uncooperative.       Fluids    Intake/Output Summary (Last 24 hours) at 2/12/2023 1234  Last data filed at 2/12/2023 1000  Gross per 24 hour   Intake 1210.04 ml   Output 850 ml   Net 360.04 ml       Laboratory  Recent Labs     02/10/23  0520 02/11/23  0424   WBC 4.1* 5.4   RBC 3.94* 4.04*   HEMOGLOBIN 11.5* 11.7*   HEMATOCRIT 34.5* 34.3*   MCV 87.6 84.9   MCH 29.2 29.0   MCHC 33.3* 34.1   RDW 49.5 48.1   PLATELETCT 96* 121*   MPV 9.5 9.4     Recent Labs     02/10/23  0520 02/11/23  0424   SODIUM 133* 131*   POTASSIUM 3.7 3.7   CHLORIDE 99 100   CO2 25 24   GLUCOSE 81 107*   BUN 12 15   CREATININE 0.91 0.69   CALCIUM 8.7 8.8                   Imaging  DX-FOOT-COMPLETE 3+ RIGHT   Final Result         1.  Small bony fragment at the dorsal base of the metatarsals on lateral view, appearance suggests small ligamentous avulsion fracture fragment, although it is uncertain which metatarsal.      DX-ANKLE 3+ VIEWS RIGHT   Final  Result         1.  No acute traumatic bony injury.              Assessment/Plan  * Alcohol withdrawal delirium, acute, hyperactive (HCC)- (present on admission)  Assessment & Plan  2/11 stopping Precedex drip but restarted overnight and seroquel initiated.  2/12 am stopped precedex again.  CIWA protocol  If stable transfer to medical floor with need of SI/Legal hold  Thiamine and MVI.      Sore throat  Assessment & Plan  Likley GERD/refllux.  Supportive care.    Suicide attempt (HCC)- (present on admission)  Assessment & Plan  Psych consulting.  Suicide precautions    Hyponatremia  Assessment & Plan  Appears euvolemic  IV fluids  Monitor BMP  2/10 Na:133  2/11 Na:131    Elevated liver enzymes  Assessment & Plan  EtOH use likely.  2/11 AST:71 <111  Possible component of hepatosteatosis   Monitor cmp    Cannabis use disorder  Assessment & Plan  Positive on drug screen    Overweight  Assessment & Plan  Body mass index is 29 kg/m².  Encourage active and healthy lifestyle choices once more alert    Closed avulsion fracture of metatarsal bone of right foot  Assessment & Plan  Noted on Xray right foot  Boot in place  Supportive pain control  2/11 started calcium and vitamin D    Intractable nausea and vomiting  Assessment & Plan  IVF  Supportive anti-emetic prn  pepcid    Dehydration  Assessment & Plan  IVF wean once taking better oral intake    Suicidal ideation  Assessment & Plan  Under legal hold with sitter in room.  Stopped Zyprexa  Psych consulting.         VTE prophylaxis: SCDs/TEDs    I have performed a physical exam and reviewed and updated ROS and Plan today (2/12/2023). In review of yesterday's note (2/11/2023), there are no changes except as documented above.

## 2023-02-12 NOTE — PROGRESS NOTES
Pt became extremely combative and stated that she was going to leave. Pt started screaming and crawling out of bed. Precedex drip restarted. Will continue to monitor.

## 2023-02-13 PROBLEM — D69.6 THROMBOCYTOPENIA (HCC): Status: ACTIVE | Noted: 2023-02-13

## 2023-02-13 LAB
ALBUMIN SERPL BCP-MCNC: 3.8 G/DL (ref 3.2–4.9)
ALBUMIN SERPL BCP-MCNC: 4.2 G/DL (ref 3.2–4.9)
ALBUMIN/GLOB SERPL: 1.5 G/DL
ALBUMIN/GLOB SERPL: 1.6 G/DL
ALP SERPL-CCNC: 73 U/L (ref 30–99)
ALP SERPL-CCNC: 75 U/L (ref 30–99)
ALT SERPL-CCNC: 38 U/L (ref 2–50)
ALT SERPL-CCNC: 39 U/L (ref 2–50)
ANION GAP SERPL CALC-SCNC: 9 MMOL/L (ref 7–16)
ANION GAP SERPL CALC-SCNC: 9 MMOL/L (ref 7–16)
AST SERPL-CCNC: 36 U/L (ref 12–45)
AST SERPL-CCNC: 41 U/L (ref 12–45)
BASOPHILS # BLD AUTO: 0.9 % (ref 0–1.8)
BASOPHILS # BLD AUTO: 0.9 % (ref 0–1.8)
BASOPHILS # BLD: 0.04 K/UL (ref 0–0.12)
BASOPHILS # BLD: 0.04 K/UL (ref 0–0.12)
BILIRUB SERPL-MCNC: 0.5 MG/DL (ref 0.1–1.5)
BILIRUB SERPL-MCNC: 0.6 MG/DL (ref 0.1–1.5)
BUN SERPL-MCNC: 5 MG/DL (ref 8–22)
BUN SERPL-MCNC: 6 MG/DL (ref 8–22)
CALCIUM ALBUM COR SERPL-MCNC: 9 MG/DL (ref 8.5–10.5)
CALCIUM ALBUM COR SERPL-MCNC: 9.1 MG/DL (ref 8.5–10.5)
CALCIUM SERPL-MCNC: 8.8 MG/DL (ref 8.5–10.5)
CALCIUM SERPL-MCNC: 9.3 MG/DL (ref 8.5–10.5)
CHLORIDE SERPL-SCNC: 103 MMOL/L (ref 96–112)
CHLORIDE SERPL-SCNC: 103 MMOL/L (ref 96–112)
CO2 SERPL-SCNC: 23 MMOL/L (ref 20–33)
CO2 SERPL-SCNC: 26 MMOL/L (ref 20–33)
CREAT SERPL-MCNC: 0.59 MG/DL (ref 0.5–1.4)
CREAT SERPL-MCNC: 0.67 MG/DL (ref 0.5–1.4)
EOSINOPHIL # BLD AUTO: 0.09 K/UL (ref 0–0.51)
EOSINOPHIL # BLD AUTO: 0.12 K/UL (ref 0–0.51)
EOSINOPHIL NFR BLD: 2.1 % (ref 0–6.9)
EOSINOPHIL NFR BLD: 2.7 % (ref 0–6.9)
ERYTHROCYTE [DISTWIDTH] IN BLOOD BY AUTOMATED COUNT: 53.2 FL (ref 35.9–50)
ERYTHROCYTE [DISTWIDTH] IN BLOOD BY AUTOMATED COUNT: 53.7 FL (ref 35.9–50)
GFR SERPLBLD CREATININE-BSD FMLA CKD-EPI: 125 ML/MIN/1.73 M 2
GFR SERPLBLD CREATININE-BSD FMLA CKD-EPI: 129 ML/MIN/1.73 M 2
GLOBULIN SER CALC-MCNC: 2.6 G/DL (ref 1.9–3.5)
GLOBULIN SER CALC-MCNC: 2.7 G/DL (ref 1.9–3.5)
GLUCOSE SERPL-MCNC: 102 MG/DL (ref 65–99)
GLUCOSE SERPL-MCNC: 90 MG/DL (ref 65–99)
HCT VFR BLD AUTO: 38.2 % (ref 37–47)
HCT VFR BLD AUTO: 38.8 % (ref 37–47)
HGB BLD-MCNC: 12.4 G/DL (ref 12–16)
HGB BLD-MCNC: 12.8 G/DL (ref 12–16)
IMM GRANULOCYTES # BLD AUTO: 0.08 K/UL (ref 0–0.11)
IMM GRANULOCYTES # BLD AUTO: 0.09 K/UL (ref 0–0.11)
IMM GRANULOCYTES NFR BLD AUTO: 1.9 % (ref 0–0.9)
IMM GRANULOCYTES NFR BLD AUTO: 2 % (ref 0–0.9)
LYMPHOCYTES # BLD AUTO: 1.61 K/UL (ref 1–4.8)
LYMPHOCYTES # BLD AUTO: 2.03 K/UL (ref 1–4.8)
LYMPHOCYTES NFR BLD: 38 % (ref 22–41)
LYMPHOCYTES NFR BLD: 45.2 % (ref 22–41)
MAGNESIUM SERPL-MCNC: 1.9 MG/DL (ref 1.5–2.5)
MAGNESIUM SERPL-MCNC: 2.2 MG/DL (ref 1.5–2.5)
MCH RBC QN AUTO: 28.8 PG (ref 27–33)
MCH RBC QN AUTO: 29.2 PG (ref 27–33)
MCHC RBC AUTO-ENTMCNC: 32.5 G/DL (ref 33.6–35)
MCHC RBC AUTO-ENTMCNC: 33 G/DL (ref 33.6–35)
MCV RBC AUTO: 88.4 FL (ref 81.4–97.8)
MCV RBC AUTO: 88.8 FL (ref 81.4–97.8)
MONOCYTES # BLD AUTO: 0.43 K/UL (ref 0–0.85)
MONOCYTES # BLD AUTO: 0.54 K/UL (ref 0–0.85)
MONOCYTES NFR BLD AUTO: 12.7 % (ref 0–13.4)
MONOCYTES NFR BLD AUTO: 9.6 % (ref 0–13.4)
NEUTROPHILS # BLD AUTO: 1.78 K/UL (ref 2–7.15)
NEUTROPHILS # BLD AUTO: 1.88 K/UL (ref 2–7.15)
NEUTROPHILS NFR BLD: 39.6 % (ref 44–72)
NEUTROPHILS NFR BLD: 44.4 % (ref 44–72)
NRBC # BLD AUTO: 0 K/UL
NRBC # BLD AUTO: 0 K/UL
NRBC BLD-RTO: 0 /100 WBC
NRBC BLD-RTO: 0 /100 WBC
PHOSPHATE SERPL-MCNC: 3.8 MG/DL (ref 2.5–4.5)
PHOSPHATE SERPL-MCNC: 4.1 MG/DL (ref 2.5–4.5)
PLATELET # BLD AUTO: 131 K/UL (ref 164–446)
PLATELET # BLD AUTO: 171 K/UL (ref 164–446)
PMV BLD AUTO: 8.7 FL (ref 9–12.9)
PMV BLD AUTO: 8.9 FL (ref 9–12.9)
POTASSIUM SERPL-SCNC: 3.8 MMOL/L (ref 3.6–5.5)
POTASSIUM SERPL-SCNC: 4.5 MMOL/L (ref 3.6–5.5)
PROT SERPL-MCNC: 6.4 G/DL (ref 6–8.2)
PROT SERPL-MCNC: 6.9 G/DL (ref 6–8.2)
RBC # BLD AUTO: 4.3 M/UL (ref 4.2–5.4)
RBC # BLD AUTO: 4.39 M/UL (ref 4.2–5.4)
SODIUM SERPL-SCNC: 135 MMOL/L (ref 135–145)
SODIUM SERPL-SCNC: 138 MMOL/L (ref 135–145)
WBC # BLD AUTO: 4.2 K/UL (ref 4.8–10.8)
WBC # BLD AUTO: 4.5 K/UL (ref 4.8–10.8)

## 2023-02-13 PROCEDURE — 84100 ASSAY OF PHOSPHORUS: CPT

## 2023-02-13 PROCEDURE — 36415 COLL VENOUS BLD VENIPUNCTURE: CPT

## 2023-02-13 PROCEDURE — 700111 HCHG RX REV CODE 636 W/ 250 OVERRIDE (IP): Performed by: INTERNAL MEDICINE

## 2023-02-13 PROCEDURE — 700111 HCHG RX REV CODE 636 W/ 250 OVERRIDE (IP): Performed by: STUDENT IN AN ORGANIZED HEALTH CARE EDUCATION/TRAINING PROGRAM

## 2023-02-13 PROCEDURE — 85025 COMPLETE CBC W/AUTO DIFF WBC: CPT

## 2023-02-13 PROCEDURE — A9270 NON-COVERED ITEM OR SERVICE: HCPCS | Performed by: HOSPITALIST

## 2023-02-13 PROCEDURE — 80053 COMPREHEN METABOLIC PANEL: CPT

## 2023-02-13 PROCEDURE — 99232 SBSQ HOSP IP/OBS MODERATE 35: CPT | Mod: GC | Performed by: PSYCHIATRY & NEUROLOGY

## 2023-02-13 PROCEDURE — 83735 ASSAY OF MAGNESIUM: CPT

## 2023-02-13 PROCEDURE — 700102 HCHG RX REV CODE 250 W/ 637 OVERRIDE(OP): Performed by: HOSPITALIST

## 2023-02-13 PROCEDURE — A9270 NON-COVERED ITEM OR SERVICE: HCPCS | Performed by: STUDENT IN AN ORGANIZED HEALTH CARE EDUCATION/TRAINING PROGRAM

## 2023-02-13 PROCEDURE — 700102 HCHG RX REV CODE 250 W/ 637 OVERRIDE(OP): Performed by: STUDENT IN AN ORGANIZED HEALTH CARE EDUCATION/TRAINING PROGRAM

## 2023-02-13 PROCEDURE — 99232 SBSQ HOSP IP/OBS MODERATE 35: CPT | Performed by: HOSPITALIST

## 2023-02-13 PROCEDURE — 770001 HCHG ROOM/CARE - MED/SURG/GYN PRIV*

## 2023-02-13 RX ORDER — POTASSIUM CHLORIDE 20 MEQ/1
40 TABLET, EXTENDED RELEASE ORAL ONCE
Status: COMPLETED | OUTPATIENT
Start: 2023-02-13 | End: 2023-02-13

## 2023-02-13 RX ORDER — LANOLIN ALCOHOL/MO/W.PET/CERES
400 CREAM (GRAM) TOPICAL DAILY
Status: COMPLETED | OUTPATIENT
Start: 2023-02-13 | End: 2023-02-15

## 2023-02-13 RX ORDER — MAGNESIUM SULFATE HEPTAHYDRATE 40 MG/ML
2 INJECTION, SOLUTION INTRAVENOUS ONCE
Status: DISCONTINUED | OUTPATIENT
Start: 2023-02-13 | End: 2023-02-13

## 2023-02-13 RX ADMIN — POTASSIUM CHLORIDE 40 MEQ: 1500 TABLET, EXTENDED RELEASE ORAL at 10:44

## 2023-02-13 RX ADMIN — OXYCODONE HYDROCHLORIDE 5 MG: 5 TABLET ORAL at 06:05

## 2023-02-13 RX ADMIN — Medication 1 TABLET: at 06:04

## 2023-02-13 RX ADMIN — LORAZEPAM 1 MG: 2 INJECTION INTRAMUSCULAR; INTRAVENOUS at 00:13

## 2023-02-13 RX ADMIN — THIAMINE HCL TAB 100 MG 100 MG: 100 TAB at 06:06

## 2023-02-13 RX ADMIN — THERA TABS 1 TABLET: TAB at 06:04

## 2023-02-13 RX ADMIN — OXYCODONE HYDROCHLORIDE 5 MG: 5 TABLET ORAL at 14:39

## 2023-02-13 RX ADMIN — LORAZEPAM 2 MG: 2 INJECTION INTRAMUSCULAR; INTRAVENOUS at 02:00

## 2023-02-13 RX ADMIN — LORAZEPAM 2 MG: 2 INJECTION INTRAMUSCULAR; INTRAVENOUS at 21:14

## 2023-02-13 RX ADMIN — Medication 400 MG: at 10:44

## 2023-02-13 RX ADMIN — Medication 1 TABLET: at 18:22

## 2023-02-13 RX ADMIN — LORAZEPAM 2 MG: 2 INJECTION INTRAMUSCULAR; INTRAVENOUS at 06:04

## 2023-02-13 RX ADMIN — LORAZEPAM 2 MG: 2 INJECTION INTRAMUSCULAR; INTRAVENOUS at 16:02

## 2023-02-13 RX ADMIN — ESCITALOPRAM OXALATE 10 MG: 10 TABLET ORAL at 06:05

## 2023-02-13 RX ADMIN — OXYCODONE HYDROCHLORIDE 5 MG: 5 TABLET ORAL at 21:30

## 2023-02-13 RX ADMIN — QUETIAPINE FUMARATE 25 MG: 25 TABLET ORAL at 06:05

## 2023-02-13 RX ADMIN — FOLIC ACID 1 MG: 1 TABLET ORAL at 06:04

## 2023-02-13 RX ADMIN — LORAZEPAM 1 MG: 2 INJECTION INTRAMUSCULAR; INTRAVENOUS at 18:20

## 2023-02-13 RX ADMIN — Medication 1 TABLET: at 10:44

## 2023-02-13 RX ADMIN — OMEPRAZOLE 20 MG: 20 CAPSULE, DELAYED RELEASE ORAL at 06:04

## 2023-02-13 RX ADMIN — HALOPERIDOL LACTATE 1 MG: 5 INJECTION, SOLUTION INTRAMUSCULAR at 00:10

## 2023-02-13 RX ADMIN — QUETIAPINE FUMARATE 25 MG: 25 TABLET ORAL at 18:21

## 2023-02-13 RX ADMIN — OXYCODONE HYDROCHLORIDE 5 MG: 5 TABLET ORAL at 18:22

## 2023-02-13 RX ADMIN — SENNOSIDES AND DOCUSATE SODIUM 2 TABLET: 50; 8.6 TABLET ORAL at 06:05

## 2023-02-13 RX ADMIN — SENNOSIDES AND DOCUSATE SODIUM 2 TABLET: 50; 8.6 TABLET ORAL at 18:26

## 2023-02-13 RX ADMIN — RIVAROXABAN 10 MG: 10 TABLET, FILM COATED ORAL at 18:25

## 2023-02-13 RX ADMIN — ONDANSETRON 4 MG: 4 TABLET, ORALLY DISINTEGRATING ORAL at 12:52

## 2023-02-13 RX ADMIN — LORAZEPAM 1 MG: 2 INJECTION INTRAMUSCULAR; INTRAVENOUS at 12:52

## 2023-02-13 RX ADMIN — NICOTINE TRANSDERMAL SYSTEM 21 MG: 21 PATCH, EXTENDED RELEASE TRANSDERMAL at 06:06

## 2023-02-13 ASSESSMENT — LIFESTYLE VARIABLES
AUDITORY DISTURBANCES: NOT PRESENT
AGITATION: *
HEADACHE, FULLNESS IN HEAD: NOT PRESENT
PAROXYSMAL SWEATS: NO SWEAT VISIBLE
TOTAL SCORE: 8
ANXIETY: MILDLY ANXIOUS
HEADACHE, FULLNESS IN HEAD: NOT PRESENT
AUDITORY DISTURBANCES: NOT PRESENT
TOTAL SCORE: 3
ANXIETY: MODERATELY ANXIOUS OR GUARDED, SO ANXIETY IS INFERRED
ORIENTATION AND CLOUDING OF SENSORIUM: ORIENTED AND CAN DO SERIAL ADDITIONS
ORIENTATION AND CLOUDING OF SENSORIUM: ORIENTED AND CAN DO SERIAL ADDITIONS
AUDITORY DISTURBANCES: NOT PRESENT
VISUAL DISTURBANCES: NOT PRESENT
HEADACHE, FULLNESS IN HEAD: VERY MILD
TOTAL SCORE: 5
VISUAL DISTURBANCES: NOT PRESENT
TREMOR: TREMOR NOT VISIBLE BUT CAN BE FELT, FINGERTIP TO FINGERTIP
NAUSEA AND VOMITING: NO NAUSEA AND NO VOMITING
PAROXYSMAL SWEATS: NO SWEAT VISIBLE
HEADACHE, FULLNESS IN HEAD: NOT PRESENT
PAROXYSMAL SWEATS: BARELY PERCEPTIBLE SWEATING. PALMS MOIST
AGITATION: SOMEWHAT MORE THAN NORMAL ACTIVITY
ORIENTATION AND CLOUDING OF SENSORIUM: CANNOT DO SERIAL ADDITIONS OR IS UNCERTAIN ABOUT DATE
TOTAL SCORE: 7
VISUAL DISTURBANCES: NOT PRESENT
ANXIETY: MODERATELY ANXIOUS OR GUARDED, SO ANXIETY IS INFERRED
ANXIETY: NO ANXIETY (AT EASE)
NAUSEA AND VOMITING: MILD NAUSEA WITH NO VOMITING
AGITATION: *
TREMOR: TREMOR NOT VISIBLE BUT CAN BE FELT, FINGERTIP TO FINGERTIP
NAUSEA AND VOMITING: NO NAUSEA AND NO VOMITING
AGITATION: NORMAL ACTIVITY
NAUSEA AND VOMITING: MILD NAUSEA WITH NO VOMITING
TREMOR: TREMOR NOT VISIBLE BUT CAN BE FELT, FINGERTIP TO FINGERTIP
TREMOR: TREMOR NOT VISIBLE BUT CAN BE FELT, FINGERTIP TO FINGERTIP
VISUAL DISTURBANCES: NOT PRESENT
AUDITORY DISTURBANCES: NOT PRESENT
PAROXYSMAL SWEATS: NO SWEAT VISIBLE
SUBSTANCE_ABUSE: 1
ORIENTATION AND CLOUDING OF SENSORIUM: ORIENTED AND CAN DO SERIAL ADDITIONS

## 2023-02-13 ASSESSMENT — PAIN DESCRIPTION - PAIN TYPE
TYPE: ACUTE PAIN
TYPE: ACUTE PAIN;CHRONIC PAIN
TYPE: ACUTE PAIN;CHRONIC PAIN
TYPE: ACUTE PAIN

## 2023-02-13 ASSESSMENT — ENCOUNTER SYMPTOMS
NAUSEA: 0
HALLUCINATIONS: 1
SORE THROAT: 0
HEADACHES: 0
TREMORS: 1

## 2023-02-13 NOTE — PROGRESS NOTES
Security and ED called as pt does not have belongings - jacket, shirt, leggings, and black vans. Also her piercings that were removed.    Unable to get any information on missing items.    T7 charge RN called, no information yet.     Lost & found phone # given to pt family

## 2023-02-13 NOTE — PROGRESS NOTES
Dr. Beavers with hospital team at bedside. Pt denies SI/HI at this time. Pt RASS -1 but oriented x 4. Dr. Beavers states he will look adjust medications, see MAR.

## 2023-02-13 NOTE — PROGRESS NOTES
0630 got report from anne-marie that pt is mobile x1, she’s 1:1 for safety and help to the restroom.  Pt belongings include her cell phone/wall  and small purse. Note the “STOP” sign checklist was initialed by someone.    0700 I asked RN if I was here for just safety and was told “yes” and she would let me know if something changed.

## 2023-02-13 NOTE — CARE PLAN
Problem: Knowledge Deficit - Standard  Goal: Patient and family/care givers will demonstrate understanding of plan of care, disease process/condition, diagnostic tests and medications  Outcome: Progressing     Problem: Optimal Care for Alcohol Withdrawal  Goal: Optimal Care for the alcohol withdrawal patient  Outcome: Progressing     Problem: Seizure Precautions  Goal: Implementation of seizure precautions  Outcome: Progressing     Problem: Lifestyle Changes  Goal: Patient's ability to identify lifestyle changes and available resources to help reduce recurrence of condition will improve  Outcome: Progressing     Problem: Psychosocial  Goal: Patient's level of anxiety will decrease  Outcome: Progressing  Goal: Spiritual and cultural needs incorporated into hospitalization  Outcome: Progressing     Problem: Risk for Aspiration  Goal: Patient's risk for aspiration will be absent or decrease  Outcome: Progressing     Problem: Provide Safe Environment  Goal: Suicide environmental safety, protocols, policies, and practices will be implemented  Outcome: Progressing     Problem: Psychosocial  Goal: Patient's ability to identify and develop effective coping behaviors will improve  Outcome: Progressing  Goal: Patient's ability to identify and utilize available support systems will improve  Outcome: Progressing     Problem: Pain - Standard  Goal: Alleviation of pain or a reduction in pain to the patient’s comfort goal  Outcome: Progressing     Problem: Skin Integrity  Goal: Skin integrity is maintained or improved  Outcome: Progressing     Problem: Fall Risk  Goal: Patient will remain free from falls  Outcome: Progressing

## 2023-02-13 NOTE — CARE PLAN
The patient is Stable - Low risk of patient condition declining or worsening    Shift Goals  Clinical Goals: patient will remain vitally stable and appropriate  Patient Goals: comfort and sleep  Family Goals: renzo    Progress made toward(s) clinical / shift goals:      Problem: Knowledge Deficit - Standard  Goal: Patient and family/care givers will demonstrate understanding of plan of care, disease process/condition, diagnostic tests and medications  Outcome: Progressing  Note: Patient reports an understanding in plan of care     Problem: Optimal Care for Alcohol Withdrawal  Goal: Optimal Care for the alcohol withdrawal patient  Outcome: Progressing     Problem: Seizure Precautions  Goal: Implementation of seizure precautions  Outcome: Progressing     Problem: Provide Safe Environment  Goal: Suicide environmental safety, protocols, policies, and practices will be implemented  Outcome: Progressing     Problem: Pain - Standard  Goal: Alleviation of pain or a reduction in pain to the patient’s comfort goal  Outcome: Progressing  Note: Patient reports PRN pain meds helping manage pain      Problem: Skin Integrity  Goal: Skin integrity is maintained or improved  Outcome: Progressing     Problem: Fall Risk  Goal: Patient will remain free from falls  Outcome: Progressing       Patient is not progressing towards the following goals:

## 2023-02-13 NOTE — PROGRESS NOTES
[facility] PSYCHIATRIC FOLLOW-UP:(established)  *Reason for admission:    BIB MS from home after pts  called PD because pt was attempting to crawl out a window and stated she wanted to die....   auditory hallucinations ... denies any suicidal ideation or homicidal ideation.     *Legal Hold Status: on legal hold                S: she is very sleepy but is trying to answer and says she is depressed, anxious but no longer having hallucinations and that she only gets these with alcohol withdrawal. She does not want geodon.  and bedside and agrees with her. He doesn't know why the geodon because he also believes it is alcohol related but does say that lexapro and lamictal worked well for her. She is not SI.  is setting up outpt f/u for when she is dc'd and can be with her 24/7.      O: Medical ROS (as pertinent):                      *Psychiatric Examination:   Vitals:   Vitals:    02/12/23 1300 02/12/23 1400 02/12/23 1500 02/12/23 1600   BP: 113/72 114/87 (!) 119/91 (!) 129/93   Pulse: 90 (!) 120 (!) 119 (!) 113   Resp: 15 (!) 22 18 (!) 24   Temp:    36 °C (96.8 °F)   TempSrc:    Temporal   SpO2: 95% 98% 98% 100%   Weight:       Height:         General Appearance:  obese and poor eye contact, falling asleep  Abnormal Movements: slowed   Gait and Posture: not observed  Speech: minimal and slowed  Thought Process: slow rate  Associations:   linear  Abnormal or Psychotic Thoughts: none  Judgement and Insight: impaired based on behavior  Orientation: grossly intact  Recent and Remote Memory: grossly intact  Attention Span and Concentration: diminished  Language:fluent  Fund of Knowledge: not tested  Mood and Affect: depressed, anxious, and blunted  SI/HI:  suicidal - no and homicidal - no        Current Medications:  Scheduled Medications   Medication Dose Frequency    QUEtiapine  25 mg BID    escitalopram  10 mg DAILY    calcium-vitamin D  1 Tablet TID    omeprazole  20 mg DAILY     rivaroxaban  10 mg DAILY AT 1800    thiamine  100 mg DAILY    And    multivitamin  1 Tablet DAILY    And    folic acid  1 mg DAILY    senna-docusate  2 Tablet BID    nicotine  21 mg Daily-0600    Pharmacy Consult Request  1 Each PHARMACY TO DOSE    LORazepam  2 mg Q4HRS    MD Alert...Adult ICU Electrolyte Replacement per Pharmacy   PHARMACY TO DOSE     POC Breathalizer 0.160       Latest Reference Range & Units Most Recent   Diagnostic Alcohol <10.1 mg/dL 72.5 (H)  2/9/23 00:57   (H): Data is abnormally high   Latest Reference Range & Units Most Recent   TSH 0.380 - 5.330 uIU/mL 0.570  12/21/22 12:00     EKG: qtc 457     *ASSESSMENT/RECOMENDATIONS:  1. Alcohol use disorder,severe and withdrawals with perceptual disturbance: the latter resolved.   2  Alcohol withdrawal delirum continues  3 Depressive disorder unspc  4  Anxiety disorder unspc  5  Hx of PTSD         Medical:  -R foot closed fx   -hyponatremia improved  -elevated LFTs   -dehydrated    Legal hold: on legal hold  Observation status:   -line of site with sitter  -telemonitor  -no sitter needed    Visitors: yes    Personal belongings:  no       Medication and Other Recommendations: final orders as per Tx Tm  No change to meds today    Will continue to follow with you.        Discharge recommendations: TBD

## 2023-02-14 LAB
ALBUMIN SERPL BCP-MCNC: 3.9 G/DL (ref 3.2–4.9)
ALBUMIN/GLOB SERPL: 1.7 G/DL
ALP SERPL-CCNC: 69 U/L (ref 30–99)
ALT SERPL-CCNC: 39 U/L (ref 2–50)
ANION GAP SERPL CALC-SCNC: 11 MMOL/L (ref 7–16)
AST SERPL-CCNC: 37 U/L (ref 12–45)
BASOPHILS # BLD AUTO: 0.8 % (ref 0–1.8)
BASOPHILS # BLD: 0.03 K/UL (ref 0–0.12)
BILIRUB SERPL-MCNC: 0.5 MG/DL (ref 0.1–1.5)
BUN SERPL-MCNC: 6 MG/DL (ref 8–22)
CALCIUM ALBUM COR SERPL-MCNC: 9.1 MG/DL (ref 8.5–10.5)
CALCIUM SERPL-MCNC: 9 MG/DL (ref 8.5–10.5)
CHLORIDE SERPL-SCNC: 100 MMOL/L (ref 96–112)
CO2 SERPL-SCNC: 23 MMOL/L (ref 20–33)
CREAT SERPL-MCNC: 0.76 MG/DL (ref 0.5–1.4)
EOSINOPHIL # BLD AUTO: 0.11 K/UL (ref 0–0.51)
EOSINOPHIL NFR BLD: 2.8 % (ref 0–6.9)
ERYTHROCYTE [DISTWIDTH] IN BLOOD BY AUTOMATED COUNT: 54.4 FL (ref 35.9–50)
GFR SERPLBLD CREATININE-BSD FMLA CKD-EPI: 112 ML/MIN/1.73 M 2
GLOBULIN SER CALC-MCNC: 2.3 G/DL (ref 1.9–3.5)
GLUCOSE SERPL-MCNC: 135 MG/DL (ref 65–99)
HCT VFR BLD AUTO: 35.1 % (ref 37–47)
HGB BLD-MCNC: 11.6 G/DL (ref 12–16)
IMM GRANULOCYTES # BLD AUTO: 0.07 K/UL (ref 0–0.11)
IMM GRANULOCYTES NFR BLD AUTO: 1.8 % (ref 0–0.9)
LYMPHOCYTES # BLD AUTO: 1.78 K/UL (ref 1–4.8)
LYMPHOCYTES NFR BLD: 45.4 % (ref 22–41)
MAGNESIUM SERPL-MCNC: 2 MG/DL (ref 1.5–2.5)
MCH RBC QN AUTO: 29.1 PG (ref 27–33)
MCHC RBC AUTO-ENTMCNC: 33 G/DL (ref 33.6–35)
MCV RBC AUTO: 88.2 FL (ref 81.4–97.8)
MONOCYTES # BLD AUTO: 0.41 K/UL (ref 0–0.85)
MONOCYTES NFR BLD AUTO: 10.5 % (ref 0–13.4)
NEUTROPHILS # BLD AUTO: 1.52 K/UL (ref 2–7.15)
NEUTROPHILS NFR BLD: 38.7 % (ref 44–72)
NRBC # BLD AUTO: 0 K/UL
NRBC BLD-RTO: 0 /100 WBC
PHOSPHATE SERPL-MCNC: 3.8 MG/DL (ref 2.5–4.5)
PLATELET # BLD AUTO: 167 K/UL (ref 164–446)
PMV BLD AUTO: 8.8 FL (ref 9–12.9)
POTASSIUM SERPL-SCNC: 4.1 MMOL/L (ref 3.6–5.5)
PROT SERPL-MCNC: 6.2 G/DL (ref 6–8.2)
RBC # BLD AUTO: 3.98 M/UL (ref 4.2–5.4)
SODIUM SERPL-SCNC: 134 MMOL/L (ref 135–145)
WBC # BLD AUTO: 3.9 K/UL (ref 4.8–10.8)

## 2023-02-14 PROCEDURE — 700102 HCHG RX REV CODE 250 W/ 637 OVERRIDE(OP): Performed by: HOSPITALIST

## 2023-02-14 PROCEDURE — A9270 NON-COVERED ITEM OR SERVICE: HCPCS | Performed by: HOSPITALIST

## 2023-02-14 PROCEDURE — A9270 NON-COVERED ITEM OR SERVICE: HCPCS | Performed by: STUDENT IN AN ORGANIZED HEALTH CARE EDUCATION/TRAINING PROGRAM

## 2023-02-14 PROCEDURE — 80053 COMPREHEN METABOLIC PANEL: CPT

## 2023-02-14 PROCEDURE — 99233 SBSQ HOSP IP/OBS HIGH 50: CPT | Performed by: STUDENT IN AN ORGANIZED HEALTH CARE EDUCATION/TRAINING PROGRAM

## 2023-02-14 PROCEDURE — 83735 ASSAY OF MAGNESIUM: CPT

## 2023-02-14 PROCEDURE — 84100 ASSAY OF PHOSPHORUS: CPT

## 2023-02-14 PROCEDURE — 700102 HCHG RX REV CODE 250 W/ 637 OVERRIDE(OP): Performed by: STUDENT IN AN ORGANIZED HEALTH CARE EDUCATION/TRAINING PROGRAM

## 2023-02-14 PROCEDURE — 770001 HCHG ROOM/CARE - MED/SURG/GYN PRIV*

## 2023-02-14 PROCEDURE — 99232 SBSQ HOSP IP/OBS MODERATE 35: CPT | Performed by: PSYCHIATRY & NEUROLOGY

## 2023-02-14 PROCEDURE — 700111 HCHG RX REV CODE 636 W/ 250 OVERRIDE (IP): Performed by: INTERNAL MEDICINE

## 2023-02-14 PROCEDURE — 700111 HCHG RX REV CODE 636 W/ 250 OVERRIDE (IP): Performed by: STUDENT IN AN ORGANIZED HEALTH CARE EDUCATION/TRAINING PROGRAM

## 2023-02-14 PROCEDURE — 85025 COMPLETE CBC W/AUTO DIFF WBC: CPT

## 2023-02-14 RX ORDER — QUETIAPINE FUMARATE 25 MG/1
50 TABLET, FILM COATED ORAL NIGHTLY
Status: DISCONTINUED | OUTPATIENT
Start: 2023-02-14 | End: 2023-02-15 | Stop reason: HOSPADM

## 2023-02-14 RX ORDER — QUETIAPINE FUMARATE 25 MG/1
50 TABLET, FILM COATED ORAL NIGHTLY
Status: DISCONTINUED | OUTPATIENT
Start: 2023-02-15 | End: 2023-02-14

## 2023-02-14 RX ADMIN — ONDANSETRON 4 MG: 4 TABLET, ORALLY DISINTEGRATING ORAL at 07:15

## 2023-02-14 RX ADMIN — LORAZEPAM 1 MG: 2 INJECTION INTRAMUSCULAR; INTRAVENOUS at 22:40

## 2023-02-14 RX ADMIN — LORAZEPAM 1 MG: 2 INJECTION INTRAMUSCULAR; INTRAVENOUS at 11:25

## 2023-02-14 RX ADMIN — ESCITALOPRAM OXALATE 10 MG: 10 TABLET ORAL at 05:08

## 2023-02-14 RX ADMIN — LORAZEPAM 1 MG: 2 INJECTION INTRAMUSCULAR; INTRAVENOUS at 15:14

## 2023-02-14 RX ADMIN — Medication 1 TABLET: at 05:08

## 2023-02-14 RX ADMIN — SENNOSIDES AND DOCUSATE SODIUM 2 TABLET: 50; 8.6 TABLET ORAL at 05:09

## 2023-02-14 RX ADMIN — QUETIAPINE FUMARATE 25 MG: 25 TABLET ORAL at 05:09

## 2023-02-14 RX ADMIN — OXYCODONE HYDROCHLORIDE 5 MG: 5 TABLET ORAL at 10:01

## 2023-02-14 RX ADMIN — OXYCODONE HYDROCHLORIDE 5 MG: 5 TABLET ORAL at 17:44

## 2023-02-14 RX ADMIN — OXYCODONE HYDROCHLORIDE 5 MG: 5 TABLET ORAL at 00:31

## 2023-02-14 RX ADMIN — LORAZEPAM 2 MG: 2 INJECTION INTRAMUSCULAR; INTRAVENOUS at 00:28

## 2023-02-14 RX ADMIN — Medication 1 TABLET: at 11:25

## 2023-02-14 RX ADMIN — OMEPRAZOLE 20 MG: 20 CAPSULE, DELAYED RELEASE ORAL at 05:08

## 2023-02-14 RX ADMIN — QUETIAPINE FUMARATE 50 MG: 25 TABLET ORAL at 20:44

## 2023-02-14 RX ADMIN — Medication 1 TABLET: at 17:44

## 2023-02-14 RX ADMIN — Medication 400 MG: at 05:08

## 2023-02-14 RX ADMIN — SENNOSIDES AND DOCUSATE SODIUM 2 TABLET: 50; 8.6 TABLET ORAL at 17:44

## 2023-02-14 RX ADMIN — LORAZEPAM 1 MG: 2 INJECTION INTRAMUSCULAR; INTRAVENOUS at 18:20

## 2023-02-14 RX ADMIN — OXYCODONE HYDROCHLORIDE 5 MG: 5 TABLET ORAL at 20:44

## 2023-02-14 RX ADMIN — OXYCODONE HYDROCHLORIDE 5 MG: 5 TABLET ORAL at 14:35

## 2023-02-14 RX ADMIN — NICOTINE TRANSDERMAL SYSTEM 21 MG: 21 PATCH, EXTENDED RELEASE TRANSDERMAL at 05:08

## 2023-02-14 RX ADMIN — RIVAROXABAN 10 MG: 10 TABLET, FILM COATED ORAL at 17:44

## 2023-02-14 RX ADMIN — OXYCODONE HYDROCHLORIDE 5 MG: 5 TABLET ORAL at 05:09

## 2023-02-14 ASSESSMENT — LIFESTYLE VARIABLES
TREMOR: *
TOTAL SCORE: 5
AGITATION: NORMAL ACTIVITY
AGITATION: SOMEWHAT MORE THAN NORMAL ACTIVITY
TOTAL SCORE: 3
HEADACHE, FULLNESS IN HEAD: NOT PRESENT
TREMOR: NO TREMOR
ORIENTATION AND CLOUDING OF SENSORIUM: ORIENTED AND CAN DO SERIAL ADDITIONS
TOTAL SCORE: 2
PAROXYSMAL SWEATS: NO SWEAT VISIBLE
ORIENTATION AND CLOUDING OF SENSORIUM: ORIENTED AND CAN DO SERIAL ADDITIONS
HEADACHE, FULLNESS IN HEAD: NOT PRESENT
ANXIETY: MILDLY ANXIOUS
HEADACHE, FULLNESS IN HEAD: NOT PRESENT
NAUSEA AND VOMITING: NO NAUSEA AND NO VOMITING
PAROXYSMAL SWEATS: NO SWEAT VISIBLE
NAUSEA AND VOMITING: MILD NAUSEA WITH NO VOMITING
AGITATION: SOMEWHAT MORE THAN NORMAL ACTIVITY
AUDITORY DISTURBANCES: NOT PRESENT
AUDITORY DISTURBANCES: NOT PRESENT
ORIENTATION AND CLOUDING OF SENSORIUM: ORIENTED AND CAN DO SERIAL ADDITIONS
AUDITORY DISTURBANCES: NOT PRESENT
VISUAL DISTURBANCES: NOT PRESENT
TREMOR: TREMOR NOT VISIBLE BUT CAN BE FELT, FINGERTIP TO FINGERTIP
SUBSTANCE_ABUSE: 1
PAROXYSMAL SWEATS: NO SWEAT VISIBLE
VISUAL DISTURBANCES: NOT PRESENT
ANXIETY: MILDLY ANXIOUS
NAUSEA AND VOMITING: NO NAUSEA AND NO VOMITING
VISUAL DISTURBANCES: NOT PRESENT
ANXIETY: *

## 2023-02-14 ASSESSMENT — ENCOUNTER SYMPTOMS
HALLUCINATIONS: 0
SORE THROAT: 0
CARDIOVASCULAR NEGATIVE: 1
HEADACHES: 1
TREMORS: 1
NAUSEA: 1
SHORTNESS OF BREATH: 0
HEADACHES: 0
NAUSEA: 0
DEPRESSION: 1
RESPIRATORY NEGATIVE: 1
MUSCULOSKELETAL NEGATIVE: 1

## 2023-02-14 ASSESSMENT — PAIN DESCRIPTION - PAIN TYPE
TYPE: ACUTE PAIN
TYPE: ACUTE PAIN;CHRONIC PAIN

## 2023-02-14 NOTE — CONSULTS
PSYCHIATRIC FOLLOW-UP:(established)  *Reason for admission:  SI, SA  *Legal Hold Status:  on legal hold  Chart reviewed.         No acute events overnight. Pt is on CIWA protocol. She is also receiving PO opioids for pain control of recent foot fracture.  Patient seen at bedside, with  in attendance.  She states that she is doing better today and attributes it to reconciling with herself and wanting to move forward with a more positive life.  She denies suicidal ideation, and reports that it has been a couple of days since the last instance.  She denies self-harm urges.   states that she has an appointment next week with [facility] to set up therapy and establish with a primary care physician.  They also have plans to attend alcohol Anonymous meetings together.   is safe for her to return home; he has no safety concerns.  He states that since his job is flexible and its hours, he can be monitoring her 24/7.  They have also coordinated babysitters for their children so that the patient does not have to be around the children by herself.  They are also planning to remove all alcohol.  's weapon has been relocated to his friend's house.  He has also recently acquired a lock box for the medication and we will plan to administer them to the patient.  In terms of physical symptoms, the patient is slightly tremulous and does endorse visual hallucinations in the form of little figures dancing and writing on the wall.    Medical ROS (as pertinent):     Review of Systems   Neurological:  Positive for tremors.   Psychiatric/Behavioral:  Positive for hallucinations. Negative for suicidal ideas.        *Psychiatric Examination:  Vitals:   Vitals:    02/13/23 1612   BP: (!) 127/90   Pulse: 95   Resp: 18   Temp: 36.3 °C (97.3 °F)   SpO2: 96%      General Appearance: Appears older than stated age, no acute distress, wearing hospital gown, laying in bed, tattoos on bilateral forearms  "visible  Abnormal Movements: Slight tremors in bilateral forearms   gait and Posture: Gait not observed; posture is okay  Speech: Normal rate and rhythm, nonpressured speech  Thought processes: Goal oriented, linear, logical  Associations: No loose associations  Abnormal or Psychotic Thoughts: Patient endorses visual hallucinations; does not appear to be responding to internal stimuli.  No paranoia or delusional content evident on evaluation.  Judgement and Insight: Fair-fair  Orientation: Alert and fully oriented  Recent and Remote Memory: No deficits in short or long-term memory  Attention Span and Concentration: Within normal limits  Language: Fluent and coherent English  Fund of Knowledge: Estimated to be average for age group  Mood and Affect: \"Better\" restricted range, nonlabile   SI/HI: Denies/denies      *EKG:   Results for orders placed or performed during the hospital encounter of 23   EKG   Result Value Ref Range    Report       [facility]    Test Date:  2023  Pt Name:    MARISSA VAUGHAN                 Department: [facility]  MRN:        [id number]                      Room:       [facility] [place]  Gender:     Female                       Technician: [facility]  :        2000                   Requested By:LÁZARO ALEXANDER  Order #:    [id number]                    Reading MD: Issac Andrews MD    Measurements  Intervals                                Axis  Rate:       104                          P:          34  NH:         138                          QRS:        42  QRSD:       88                           T:          19  QT:         347  QTc:        457    Interpretive Statements  Sinus tachycardia  ST elev, probable normal early repol pattern  No previous ECG available for comparison  Electronically Signed On 2023 19:02:34 PST by Issac Andrews MD        *Imaging: N/a   EEG:  N/a    Assessment:  Patient is a 22-year-old female with history of severe alcohol use disorder presenting with " suicidal ideation, attempting to crawl and jump out the window.  She presented with alcohol withdrawal delirium.  Patient is currently on CIWA.  She did endorse visual hallucinations today.  Patient currently denies suicidal ideation.   has set up appointments for next week for primary care and therapy.  Patient plans to attend AA meetings and cut alcohol out of her life.   is okay with her going home and can safely monitor her 24/7.  He has safely read the household of any weapons and has purchased a lock box for medication.  Although improving, will continue to observe patient for stability and therefore continue her legal hold in the meantime.    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  AST and ALT both within normal range.    Plan:  1- Legal hold: On legal hold  2- Psychotropic medications   Continue Lexapro 10 mg for mood   Continue Seroquel 25 mg bid for hallucinations related to alcohol withdrawal delirium   Recommend continuing to hold home Lamictal   3- Please transfer pt to inpatient psychiatric hospital when medically cleared and bed is available  4- Labs reviewed:  5- EKG reviewed  6- Old records reviewed  7- Collateral obtained  8-we will plan to conduct safety plan prior to discharge  9- Discussed the case with: Dr Franks  10- Psychiatry will follow up    Sitter: 1:1   Phone: only to speak to family  Visitors: family may visit  Personal belongings: none    This note was created using voice recognition software (Dragon). The accuracy of the dictation is limited by the abilities of the software. I have reviewed the note prior to signing. However, error related to voice recognition software and /or scribes may still exist and should be interpreted within the appropriate context.

## 2023-02-14 NOTE — DISCHARGE PLANNING
Legal Hold    Referral: Legal Hold Court     Intervention: Pt presented for legal hold meeting with  via phone call.  advised pt will meet with court MD's via telemedicine monitor to contest the legal hold.      Plan: Pt will present to telemedicine mental health to meet with court physicians 2/15. Will call bedside RN once time has been determined.

## 2023-02-14 NOTE — CONSULTS
"PSYCHIATRIC FOLLOW-UP:(established)  *Reason for admission:   Chief Complaint   Patient presents with    Legal 2000     BIB MS from home after pts  called PD because pt was attempting to crawl out a window and stated she wanted to die.           *Legal Hold Status: On hold           Chart reviewed.         *HPI:    Patient reports feeling depressed, and states that she does not want to go to psychiatric hospital.  She denies any SI/HIl.  She denies AVH 8 since yesterday afternoon.  Today she has not experienced any psychosis.  Her  is planning to come this afternoon for visit.  During the day she has been playing games on her phone or solitary with cards.  Patient is reporting fatigue and somnolence.  After discussing Seroquel side effects, she agreed with switching to nighttime dose.    Patient reports fair sleep and appetite.    Medical ROS (as pertinent):     Review of Systems   Constitutional:  Positive for malaise/fatigue.   Respiratory: Negative.     Cardiovascular: Negative.    Gastrointestinal:  Positive for nausea.   Genitourinary: Negative.    Musculoskeletal: Negative.    Neurological:  Positive for tremors and headaches.   Psychiatric/Behavioral:  Positive for depression. Negative for suicidal ideas.          *Psychiatric Examination:  Vitals:   Vitals:    02/14/23 0735   BP: 129/74   Pulse: 99   Resp: 18   Temp: 36.5 °C (97.7 °F)   SpO2: 96%       General Appearance: Appears stated age, calm and cooperative  Abnormal Movements: Tremors, psychomotor retardation  Gait and Posture: Normal sitting up on bed  Speech: Low volume, slowed  Thought processes: Linear  Associations: No loose associations  Abnormal or Psychotic Thoughts: Denies AVH, no paranoid delusions elicited  Judgement and Insight: Fair/fair  Orientation: Grossly oriented  Recent and Remote Memory: Appears intact  Attention Span and Concentration: Intact  Language: Fluid  Fund of Knowledge: Not tested  Mood and Affect:\" " "Depressed\", depressed  SI/HI: Denies       *Labs personally reviewed:   Recent Results (from the past 24 hour(s))   CBC WITH DIFFERENTIAL    Collection Time: 02/13/23  8:57 PM   Result Value Ref Range    WBC 4.2 (L) 4.8 - 10.8 K/uL    RBC 4.39 4.20 - 5.40 M/uL    Hemoglobin 12.8 12.0 - 16.0 g/dL    Hematocrit 38.8 37.0 - 47.0 %    MCV 88.4 81.4 - 97.8 fL    MCH 29.2 27.0 - 33.0 pg    MCHC 33.0 (L) 33.6 - 35.0 g/dL    RDW 53.7 (H) 35.9 - 50.0 fL    Platelet Count 171 164 - 446 K/uL    MPV 8.7 (L) 9.0 - 12.9 fL    Neutrophils-Polys 44.40 44.00 - 72.00 %    Lymphocytes 38.00 22.00 - 41.00 %    Monocytes 12.70 0.00 - 13.40 %    Eosinophils 2.10 0.00 - 6.90 %    Basophils 0.90 0.00 - 1.80 %    Immature Granulocytes 1.90 (H) 0.00 - 0.90 %    Nucleated RBC 0.00 /100 WBC    Neutrophils (Absolute) 1.88 (L) 2.00 - 7.15 K/uL    Lymphs (Absolute) 1.61 1.00 - 4.80 K/uL    Monos (Absolute) 0.54 0.00 - 0.85 K/uL    Eos (Absolute) 0.09 0.00 - 0.51 K/uL    Baso (Absolute) 0.04 0.00 - 0.12 K/uL    Immature Granulocytes (abs) 0.08 0.00 - 0.11 K/uL    NRBC (Absolute) 0.00 K/uL   Comp Metabolic Panel    Collection Time: 02/13/23  8:57 PM   Result Value Ref Range    Sodium 138 135 - 145 mmol/L    Potassium 4.5 3.6 - 5.5 mmol/L    Chloride 103 96 - 112 mmol/L    Co2 26 20 - 33 mmol/L    Anion Gap 9.0 7.0 - 16.0    Glucose 102 (H) 65 - 99 mg/dL    Bun 6 (L) 8 - 22 mg/dL    Creatinine 0.67 0.50 - 1.40 mg/dL    Calcium 9.3 8.5 - 10.5 mg/dL    AST(SGOT) 36 12 - 45 U/L    ALT(SGPT) 39 2 - 50 U/L    Alkaline Phosphatase 75 30 - 99 U/L    Total Bilirubin 0.6 0.1 - 1.5 mg/dL    Albumin 4.2 3.2 - 4.9 g/dL    Total Protein 6.9 6.0 - 8.2 g/dL    Globulin 2.7 1.9 - 3.5 g/dL    A-G Ratio 1.6 g/dL   Magnesium    Collection Time: 02/13/23  8:57 PM   Result Value Ref Range    Magnesium 2.2 1.5 - 2.5 mg/dL   Phosphorus    Collection Time: 02/13/23  8:57 PM   Result Value Ref Range    Phosphorus 4.1 2.5 - 4.5 mg/dL   CORRECTED CALCIUM    Collection Time: " 02/13/23  8:57 PM   Result Value Ref Range    Correct Calcium 9.1 8.5 - 10.5 mg/dL   ESTIMATED GFR    Collection Time: 02/13/23  8:57 PM   Result Value Ref Range    GFR (CKD-EPI) 125 >60 mL/min/1.73 m 2   CBC WITH DIFFERENTIAL    Collection Time: 02/14/23  7:25 AM   Result Value Ref Range    WBC 3.9 (L) 4.8 - 10.8 K/uL    RBC 3.98 (L) 4.20 - 5.40 M/uL    Hemoglobin 11.6 (L) 12.0 - 16.0 g/dL    Hematocrit 35.1 (L) 37.0 - 47.0 %    MCV 88.2 81.4 - 97.8 fL    MCH 29.1 27.0 - 33.0 pg    MCHC 33.0 (L) 33.6 - 35.0 g/dL    RDW 54.4 (H) 35.9 - 50.0 fL    Platelet Count 167 164 - 446 K/uL    MPV 8.8 (L) 9.0 - 12.9 fL    Neutrophils-Polys 38.70 (L) 44.00 - 72.00 %    Lymphocytes 45.40 (H) 22.00 - 41.00 %    Monocytes 10.50 0.00 - 13.40 %    Eosinophils 2.80 0.00 - 6.90 %    Basophils 0.80 0.00 - 1.80 %    Immature Granulocytes 1.80 (H) 0.00 - 0.90 %    Nucleated RBC 0.00 /100 WBC    Neutrophils (Absolute) 1.52 (L) 2.00 - 7.15 K/uL    Lymphs (Absolute) 1.78 1.00 - 4.80 K/uL    Monos (Absolute) 0.41 0.00 - 0.85 K/uL    Eos (Absolute) 0.11 0.00 - 0.51 K/uL    Baso (Absolute) 0.03 0.00 - 0.12 K/uL    Immature Granulocytes (abs) 0.07 0.00 - 0.11 K/uL    NRBC (Absolute) 0.00 K/uL   Comp Metabolic Panel    Collection Time: 02/14/23  7:25 AM   Result Value Ref Range    Sodium 134 (L) 135 - 145 mmol/L    Potassium 4.1 3.6 - 5.5 mmol/L    Chloride 100 96 - 112 mmol/L    Co2 23 20 - 33 mmol/L    Anion Gap 11.0 7.0 - 16.0    Glucose 135 (H) 65 - 99 mg/dL    Bun 6 (L) 8 - 22 mg/dL    Creatinine 0.76 0.50 - 1.40 mg/dL    Calcium 9.0 8.5 - 10.5 mg/dL    AST(SGOT) 37 12 - 45 U/L    ALT(SGPT) 39 2 - 50 U/L    Alkaline Phosphatase 69 30 - 99 U/L    Total Bilirubin 0.5 0.1 - 1.5 mg/dL    Albumin 3.9 3.2 - 4.9 g/dL    Total Protein 6.2 6.0 - 8.2 g/dL    Globulin 2.3 1.9 - 3.5 g/dL    A-G Ratio 1.7 g/dL   Magnesium    Collection Time: 02/14/23  7:25 AM   Result Value Ref Range    Magnesium 2.0 1.5 - 2.5 mg/dL   Phosphorus    Collection Time:  02/14/23  7:25 AM   Result Value Ref Range    Phosphorus 3.8 2.5 - 4.5 mg/dL   ESTIMATED GFR    Collection Time: 02/14/23  7:25 AM   Result Value Ref Range    GFR (CKD-EPI) 112 >60 mL/min/1.73 m 2   CORRECTED CALCIUM    Collection Time: 02/14/23  7:25 AM   Result Value Ref Range    Correct Calcium 9.1 8.5 - 10.5 mg/dL       Current Facility-Administered Medications   Medication Dose Route Frequency Provider Last Rate Last Admin    magnesium oxide tablet 400 mg  400 mg Oral DAILY KOTA RodriguesOJazzmine   400 mg at 02/14/23 0508    QUEtiapine (Seroquel) tablet 25 mg  25 mg Oral BID Kevin Benson M.D.   25 mg at 02/14/23 0509    escitalopram (Lexapro) tablet 10 mg  10 mg Oral DAILY KOTA RodriguesOJazzmine   10 mg at 02/14/23 0508    calcium-vitamin D 250-3.125 MG-MCG tablet 1 Tablet  1 Tablet Oral TID KOTA RodriguesO.   1 Tablet at 02/14/23 1125    omeprazole (PRILOSEC) capsule 20 mg  20 mg Oral DAILY KOTA RodriguesO.   20 mg at 02/14/23 0508    rivaroxaban (XARELTO) tablet 10 mg  10 mg Oral DAILY AT 1800 KOTA RodriguesO.   10 mg at 02/13/23 1825    cloNIDine (CATAPRES) tablet 0.1 mg  0.1 mg Oral Q HOUR PRN Kevin Benson M.D.        haloperidol lactate (HALDOL) injection 1 mg  1 mg Intravenous Q4HRS PRN Kevin Benson M.D.   1 mg at 02/13/23 0010    senna-docusate (PERICOLACE or SENOKOT S) 8.6-50 MG per tablet 2 Tablet  2 Tablet Oral BID Kevin Benson M.D.   2 Tablet at 02/14/23 0509    And    polyethylene glycol/lytes (MIRALAX) PACKET 1 Packet  1 Packet Oral QDAY PRN Kevin Benson M.D.        And    magnesium hydroxide (MILK OF MAGNESIA) suspension 30 mL  30 mL Oral QDAY PRN Kevin Benson M.D.        And    bisacodyl (DULCOLAX) suppository 10 mg  10 mg Rectal QDAY PRN Kevin Benson M.D.        acetaminophen (Tylenol) tablet 650 mg  650 mg Oral Q6HRS PRN Kevin Benson M.D.        labetalol (NORMODYNE/TRANDATE) injection 10 mg  10 mg Intravenous Q4HRS PRN Kevin Benson M.D.        ondansetron (ZOFRAN) syringe/vial  injection 4 mg  4 mg Intravenous Q4HRS PRN Kevin Benson M.D.   4 mg at 02/12/23 1618    ondansetron (ZOFRAN ODT) dispertab 4 mg  4 mg Oral Q4HRS PRN Kevin Benson M.D.   4 mg at 02/14/23 0715    promethazine (PHENERGAN) tablet 12.5-25 mg  12.5-25 mg Oral Q4HRS PRN Kevin Benson M.D.   25 mg at 02/09/23 1149    promethazine (PHENERGAN) suppository 12.5-25 mg  12.5-25 mg Rectal Q4HRS PRN Kevin Benson M.D.        prochlorperazine (COMPAZINE) injection 5-10 mg  5-10 mg Intravenous Q4HRS PRN Kevin Benson M.D.        guaiFENesin dextromethorphan (ROBITUSSIN DM) 100-10 MG/5ML syrup 10 mL  10 mL Oral Q6HRS PRN Kevin Benson M.D.        nicotine (NICODERM) 21 MG/24HR 21 mg  21 mg Transdermal Daily-0600 Kevin Benson M.D.   21 mg at 02/14/23 0508    And    nicotine polacrilex (NICORETTE) 2 MG piece 2 mg  2 mg Oral Q HOUR PRN Kevin Benson M.D.        Pharmacy Consult Request ...Pain Management Review 1 Each  1 Each Other PHARMACY TO DOSE Kevin Benson M.D.        oxyCODONE immediate-release (ROXICODONE) tablet 2.5 mg  2.5 mg Oral Q3HRS PRGAY Benson M.D.        Or    oxyCODONE immediate-release (ROXICODONE) tablet 5 mg  5 mg Oral Q3HRS PRN Kevin Benson M.D.   5 mg at 02/14/23 1001    Or    morphine 4 MG/ML injection 2 mg  2 mg Intravenous Q3HRS PRN Kevin Benson M.D.   2 mg at 02/12/23 2150    dexmedetomidine (PRECEDEX) 400 mcg/100mL NS premix infusion  0.1-1 mcg/kg/hr (Ideal) Intravenous Continuous Chris Goyal M.D.   Stopped at 02/12/23 1000    LORazepam (ATIVAN) injection 1-2 mg  1-2 mg Intravenous Q2HRS PRN Chris Goyal M.D.   1 mg at 02/14/23 112         Assessment:  She continues to deny SI, but reports depressed mood.  She also has a depressed affect.  I will continue psychiatric legal hold for further psychiatric stabilization for a safe discharge.      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 nightly for mood stabilization.  Continue Lexapro 10 mg p.o. daily for depression  3- Please transfer pt to inpatient psychiatric hospital when a bed is available  4- Psychiatry will follow up    Thank you for the consult.       Sitter:yes  Phone:yes  Visitors:family only, supervised  Personal belongings: phone    This note was created using voice recognition software (Dragon). The accuracy of the dictation is limited by the abilities of the software. I have reviewed the note prior to signing. However, error related to voice recognition software and /or scribes may still exist and should be interpreted within the appropriate context.

## 2023-02-14 NOTE — PROGRESS NOTES
"Patient has requested an appeal for legal hold, process started today, pt spoke with  who explained process.     I have spoke with psych and they are going to keep legal hold. Patient has been medically cleared.     Spoke with pt and  at bedside today and they have a discharge plan in place if patient were to be sent home. This includes the pt being with  24/7, even while he works. Their kids will have a . And they plan to follow up out pt with her psych doctor.     Pt  states that he does not agree with decision for pt to go to inpatient facility as he does not think it helped her on her prior admission.     Pt indecisive about her feelings about going inpatient psych. Pt stated yesterday that she would be open to going to facility if psych thought she needed it, for a maximum of five days. Today however, patient states that she is going to fight it. She states that she will be more depressed if she goes to inpatient psych and cannot see her kids.    When speaking to patient about how she feels about being home, pt states that she feels safe going home but that she \"shouldn't be left with the kids alone.\"     Pt still verbalizing and presenting symptoms of depression and anxiety, but no SI. Pt medicated per mar for symptoms.   "

## 2023-02-14 NOTE — PROGRESS NOTES
Size medium post op shoe has been sent to [phone] for pt's staff to deliver to pt to wear when ready.

## 2023-02-14 NOTE — PROGRESS NOTES
"Hospital Medicine Daily Progress Note    Date of Service  2/14/2023    Chief Complaint  Suicide attempt, attempt to crawl out a window.    Hospital Course  Jessie Carney is a 22 y.o. female with alcohol abuse, MDD, PTSD admitted 2/8/2023 with suicide attempt after attempting to jump out of a boubacar, hallucinations and ETOH withdrawal. She was admitted for psychosis and SI. Blood alcohol level elevated on admission 72.5. During her admit there were concerns of EtOH withdrawal and patient was transferred to [facility] for higher level of care and Precedex drip. This has since been improved and stabilized and she was transferred to medical floor. Psych has been following, patient is on a legal hold.   She does have a closed avulsion fraction of metatarsal bone of the right foot and is in walking shoe.       Interval Problem Update  Pt seen and examined, sitting up eating breakfast,  at bedside. States her mood is \"happy\", feels well. Wants to go home. Having pain in the Rt foot but stable and walking shoe is better than boot.   - still looks a bit tremulous but does not appear to be withdrawing   - vitals stable, labs with pancytopenia but stable  - psych following, pt is on legal hold     From a medical standpoint patient is medically clear to either discharge to inpatient psych facility vs home pending psychiatry evaluation/discretion       I have discussed this patient's plan of care and discharge plan at IDT rounds today with Case Management, Nursing, Nursing leadership, and other members of the IDT team.    Consultants/Specialty  psychiatry    Code Status  Full Code    Disposition  Patient is medically cleared for discharge.   Anticipate discharge to to a psychiatric hospital.  I have placed the appropriate orders for post-discharge needs.    Review of Systems  Review of Systems   Constitutional:  Positive for malaise/fatigue.   HENT:  Negative for sore throat.    Respiratory:  Negative for shortness of " breath.    Cardiovascular:  Negative for chest pain.   Gastrointestinal:  Negative for nausea.   Genitourinary:  Negative for dysuria.   Musculoskeletal:  Positive for joint pain.   Neurological:  Negative for headaches.   Psychiatric/Behavioral:  Positive for substance abuse. Negative for hallucinations and suicidal ideas.       Physical Exam  Temp:  [36.1 °C (96.9 °F)-36.5 °C (97.7 °F)] 36.5 °C (97.7 °F)  Pulse:  [95-99] 99  Resp:  [16-18] 18  BP: (127-130)/(74-90) 129/74  SpO2:  [96 %-100 %] 96 %    Physical Exam  Vitals reviewed.   Constitutional:       Appearance: Normal appearance. She is overweight. She is ill-appearing. She is not toxic-appearing or diaphoretic.      Interventions: She is sedated and restrained.   HENT:      Head: Normocephalic and atraumatic.      Mouth/Throat:      Mouth: Mucous membranes are moist.   Eyes:      General: No scleral icterus.     Conjunctiva/sclera: Conjunctivae normal.   Cardiovascular:      Rate and Rhythm: Normal rate and regular rhythm.      Pulses:           Radial pulses are 2+ on the right side and 2+ on the left side.        Dorsalis pedis pulses are 2+ on the right side and 2+ on the left side.      Heart sounds: No murmur heard.  Pulmonary:      Effort: Pulmonary effort is normal. No respiratory distress.      Breath sounds: Normal breath sounds. No wheezing or rales.   Abdominal:      General: Bowel sounds are normal. There is no distension.      Palpations: Abdomen is soft.      Tenderness: There is no abdominal tenderness.   Musculoskeletal:         General: No swelling or tenderness.      Cervical back: Neck supple. No muscular tenderness.      Right lower leg: No edema.      Left lower leg: No edema.   Skin:     Coloration: Skin is not jaundiced or pale.      Findings: Bruising (right foot and lower leg) present.   Neurological:      Mental Status: She is alert and oriented to person, place, and time. Mental status is at baseline.      Comments: Mildly  tremulous   Psychiatric:         Attention and Perception: Attention normal. She does not perceive visual hallucinations.         Mood and Affect: Affect is blunt.         Behavior: Behavior is cooperative.         Thought Content: Thought content is not delusional. Thought content does not include suicidal ideation.         Cognition and Memory: Cognition normal.         Judgment: Judgment normal.       Fluids    Intake/Output Summary (Last 24 hours) at 2/14/2023 1323  Last data filed at 2/14/2023 1001  Gross per 24 hour   Intake 1360 ml   Output --   Net 1360 ml       Laboratory  Recent Labs     02/13/23 0300 02/13/23 2057 02/14/23  0725   WBC 4.5* 4.2* 3.9*   RBC 4.30 4.39 3.98*   HEMOGLOBIN 12.4 12.8 11.6*   HEMATOCRIT 38.2 38.8 35.1*   MCV 88.8 88.4 88.2   MCH 28.8 29.2 29.1   MCHC 32.5* 33.0* 33.0*   RDW 53.2* 53.7* 54.4*   PLATELETCT 131* 171 167   MPV 8.9* 8.7* 8.8*     Recent Labs     02/13/23 0300 02/13/23 2057 02/14/23  0725   SODIUM 135 138 134*   POTASSIUM 3.8 4.5 4.1   CHLORIDE 103 103 100   CO2 23 26 23   GLUCOSE 90 102* 135*   BUN 5* 6* 6*   CREATININE 0.59 0.67 0.76   CALCIUM 8.8 9.3 9.0                   Imaging  DX-FOOT-COMPLETE 3+ RIGHT   Final Result         1.  Small bony fragment at the dorsal base of the metatarsals on lateral view, appearance suggests small ligamentous avulsion fracture fragment, although it is uncertain which metatarsal.      DX-ANKLE 3+ VIEWS RIGHT   Final Result         1.  No acute traumatic bony injury.              Assessment/Plan  * Alcohol withdrawal delirium, acute, hyperactive (HCC)- (present on admission)  Assessment & Plan  2/11 stopping Precedex drip but restarted overnight and seroquel initiated.  2/12 am stopped precedex again.  2/13 off precedex and can transfer to medical floor  cessaton of alcohol encouraged  CIWA protocol discontinued   Continue Thiamine and MVI.      Thrombocytopenia (HCC)  Assessment & Plan  Plt:131 <121  Likely etoh and  hepatosteatosis  Improving     Sore throat  Assessment & Plan  Likley GERD/refllux.  Supportive care.    Suicide attempt (HCC)- (present on admission)  Assessment & Plan  Psych consulting, pt is on legal hold   Continue lexapro and Seroquel   Suicide precautions  Pt is medically clear     Hyponatremia  Assessment & Plan  Appears euvolemic  IV fluids  Monitor BMP  2/10 Na:133  2/11 Na:131  2/13 Na:135    Resolved     Elevated liver enzymes  Assessment & Plan  EtOH use likely.  2/11 AST:71 <111  Possible component of hepatosteatosis   Monitor cmp    Cannabis use disorder  Assessment & Plan  Positive on drug screen    Overweight  Assessment & Plan  Body mass index is 29 kg/m².  Encourage active and healthy lifestyle choices once more alert    Closed avulsion fracture of metatarsal bone of right foot  Assessment & Plan  Noted on Xray right foot  Boot changed to walking boot for comfort   Supportive pain control  2/11 started calcium and vitamin D  2/13 patient complains of boot causing more swelling and pain.  I discussed with Ortho PA and he is placing an order for other brace/shoe  Outpatient ortho follow up     Intractable nausea and vomiting  Assessment & Plan  IVF stopped  Supportive anti-emetic prn  pepcid  Improving, tolerating diet     Dehydration  Assessment & Plan  Improved, encourage oral intake   Dc IVF     Suicidal ideation  Assessment & Plan  Under legal hold with sitter in room.  Stopped Zyprexa  On lexapro 10mg and seroquel.  Psych consulting.and recommending inpatient psychiatric facility         VTE prophylaxis: SCDs/TEDs    I have performed a physical exam and reviewed and updated ROS and Plan today (2/14/2023). In review of yesterday's note (2/13/2023), there are no changes except as documented above.

## 2023-02-14 NOTE — DISCHARGE PLANNING
[facility] ALERT TEAM DISCHARGE PLANNING NOTE    Date:  2/14/23  Patient Name:  Jessie Carney - 22 y.o. - Discharge Planning  MRN:  [id number]   YOB: 2000  ADMISSION DATE:  2/8/2023      Writer forwarded referral packet for inpatient psychiatric care to the following community providers:  [facility], [facility]   Items included in the referral packet:   __x___Face Sheet   __x___Pages 1 and 2 of completed legal hold   __x___Alert Team/Psych Assessment   __x___H&P   _____UDS   __x___Blood Alcohol   __x___Vital signs   _____Pregnancy Test (if applicable)   __x___Medications List   _____Covid Screen

## 2023-02-14 NOTE — PROGRESS NOTES
"Hospital Medicine Daily Progress Note    Date of Service  2/13/2023    Chief Complaint  Suicide attempt, attempt to crawl out a window.    Hospital Course  Jessie Carney is a 22 y.o. female wit alcohol abuse, MDD, PTSD admitted 2/8/2023 with suicide attempt and hallucinations.  She was admitted for psychosis and SI. During her admit there were concerns of EtOH withdrawal and patient was transferred to [facility] for higher level of care and Precedex drip.    Interval Problem Update  2/13 States she isn't wearing her foot brace because \"it is heavy and causes my foot to swell.\" She request a \"strong\" pain med and antinausea medication together. She states less hallucinations.  Denies SI thoughts today.    2/12: Returned to Precedex drip overnight per nursing.  Sleeping and did not want to respond to me this am.  Sitter at bedside.     2/11: sedate on Precedex drip but awakens and slowly follows commands.  States a sore throat.  States she is having audible and visual hallucinations.  Tearful at times stating she has suicidal thoughts.    I have discussed this patient's plan of care and discharge plan at IDT rounds today with Case Management, Nursing, Nursing leadership, and other members of the IDT team.    Consultants/Specialty  psychiatry    Code Status  Full Code    Disposition  Patient is not medically cleared for discharge.   Anticipate discharge to to home with close outpatient follow-up.  I have placed the appropriate orders for post-discharge needs.    Review of Systems  Review of Systems   Unable to perform ROS: Psychiatric disorder   Constitutional:  Positive for malaise/fatigue.   HENT:  Negative for sore throat.    Gastrointestinal:  Negative for nausea.   Genitourinary:  Negative for dysuria.   Neurological:  Negative for headaches.   Psychiatric/Behavioral:  Positive for hallucinations and substance abuse. Negative for suicidal ideas.       Physical Exam  Temp:  [36.2 °C (97.2 °F)-36.6 °C (97.9 °F)] 36.2 " °C (97.2 °F)  Pulse:  [] 99  Resp:  [16-18] 18  BP: (108-137)/(64-90) 130/86  SpO2:  [90 %-99 %] 99 %    Physical Exam  Vitals reviewed.   Constitutional:       Appearance: Normal appearance. She is overweight. She is not ill-appearing or diaphoretic.      Interventions: She is sedated and restrained.   HENT:      Head: Normocephalic and atraumatic.      Nose: Nose normal.      Mouth/Throat:      Pharynx: No oropharyngeal exudate.      Comments: No tonsilar enlargement good visualization of oral pharynx and no erythema.  Moist mucosa  Eyes:      General:         Right eye: No discharge.         Left eye: No discharge.   Cardiovascular:      Rate and Rhythm: Normal rate and regular rhythm.      Pulses:           Radial pulses are 2+ on the right side and 2+ on the left side.        Dorsalis pedis pulses are 2+ on the right side and 2+ on the left side.      Heart sounds: No murmur heard.  Pulmonary:      Effort: Pulmonary effort is normal. No respiratory distress.      Breath sounds: Normal breath sounds. No wheezing or rales.   Abdominal:      General: Bowel sounds are normal. There is no distension.      Palpations: Abdomen is soft.      Tenderness: There is no abdominal tenderness.   Musculoskeletal:         General: No swelling or tenderness.      Cervical back: No muscular tenderness.      Right lower leg: No edema.      Left lower leg: No edema.   Skin:     Coloration: Skin is not jaundiced or pale.      Findings: Bruising (right foot and lower leg) present.   Neurological:      Mental Status: She is lethargic.   Psychiatric:         Attention and Perception: She perceives visual hallucinations.         Mood and Affect: Mood is anxious.         Behavior: Behavior is uncooperative.         Thought Content: Thought content does not include suicidal ideation.       Fluids    Intake/Output Summary (Last 24 hours) at 2/13/2023 2101  Last data filed at 2/13/2023 1800  Gross per 24 hour   Intake 1480 ml   Output  --   Net 1480 ml       Laboratory  Recent Labs     02/11/23  0424 02/13/23  0300   WBC 5.4 4.5*   RBC 4.04* 4.30   HEMOGLOBIN 11.7* 12.4   HEMATOCRIT 34.3* 38.2   MCV 84.9 88.8   MCH 29.0 28.8   MCHC 34.1 32.5*   RDW 48.1 53.2*   PLATELETCT 121* 131*   MPV 9.4 8.9*     Recent Labs     02/11/23  0424 02/13/23  0300   SODIUM 131* 135   POTASSIUM 3.7 3.8   CHLORIDE 100 103   CO2 24 23   GLUCOSE 107* 90   BUN 15 5*   CREATININE 0.69 0.59   CALCIUM 8.8 8.8                   Imaging  DX-FOOT-COMPLETE 3+ RIGHT   Final Result         1.  Small bony fragment at the dorsal base of the metatarsals on lateral view, appearance suggests small ligamentous avulsion fracture fragment, although it is uncertain which metatarsal.      DX-ANKLE 3+ VIEWS RIGHT   Final Result         1.  No acute traumatic bony injury.              Assessment/Plan  * Alcohol withdrawal delirium, acute, hyperactive (HCC)- (present on admission)  Assessment & Plan  2/11 stopping Precedex drip but restarted overnight and seroquel initiated.  2/12 am stopped precedex again.  2/13 off precedex and can transfer to medical floor  cessaton of alcohol encouraged  CIWA protocol  If stable transfer to medical floor with need of SI/Legal hold  Thiamine and MVI.      Thrombocytopenia (HCC)  Assessment & Plan  Plt:131 <121  Likely etoh and hepatosteatosis  Monitor labs    Sore throat  Assessment & Plan  Likley GERD/refllux.  Supportive care.    Suicide attempt (HCC)- (present on admission)  Assessment & Plan  Psych consulting.  Suicide precautions    Hyponatremia  Assessment & Plan  Appears euvolemic  IV fluids  Monitor BMP  2/10 Na:133  2/11 Na:131  2/13 Na:135    Elevated liver enzymes  Assessment & Plan  EtOH use likely.  2/11 AST:71 <111  Possible component of hepatosteatosis   Monitor cmp    Cannabis use disorder  Assessment & Plan  Positive on drug screen    Overweight  Assessment & Plan  Body mass index is 29 kg/m².  Encourage active and healthy lifestyle  choices once more alert    Closed avulsion fracture of metatarsal bone of right foot  Assessment & Plan  Noted on Xray right foot  Boot in place  Supportive pain control  2/11 started calcium and vitamin D  2/13 patient complains of boot causing more swelling and pain.  I discussed with Ortho PA and he is placing an order for other brace/shoe    Intractable nausea and vomiting  Assessment & Plan  IVF stopped  Supportive anti-emetic prn  pepcid    Dehydration  Assessment & Plan  IVF wean once taking better oral intake    Suicidal ideation  Assessment & Plan  Under legal hold with sitter in room.  Stopped Zyprexa  On lexapro 10mg and seroquel.  Psych consulting.and recommending inpatient psychiatric facility         VTE prophylaxis: SCDs/TEDs    I have performed a physical exam and reviewed and updated ROS and Plan today (2/13/2023). In review of yesterday's note (2/12/2023), there are no changes except as documented above.

## 2023-02-14 NOTE — PROGRESS NOTES
Pt resting in bed, legal hold in place  1:1 sitter at bedside  AAOx4, flat affect. Anxious  RA  Pain managed with oxycodone. Back/right leg pain  Denies SI/HI    Frequent rounding in place  Education provided on POC

## 2023-02-14 NOTE — PROGRESS NOTES
A&Ox4  RA  1:1 sitter  Ambulates SBA to bathroom with ortho shoe on  POC discussed with psych, legal hold continued. Pt in process to appeal.   Pt has complaints of pain, anxiety. Addressed per mar, distraction, rest, and per pt request juices and snacks.   Bed alarm refused  Cellphone at bedside, ok by psych.   Pt requesting marker/crayon and paper to make notes for appeal, pending psych approval.

## 2023-02-14 NOTE — DISCHARGE PLANNING
Pt on a legal hold. Rev'd with Dr Pascual. Pt is med cleared.  signed page 2 of legal hold. Notified Zohra BARRERA and faxed copy to her. Zohra will send inpatient psych referrals.

## 2023-02-14 NOTE — CARE PLAN
The patient is Watcher - Medium risk of patient condition declining or worsening    Shift Goals  Clinical Goals: monitor si, ciwa  Patient Goals: feel better  Family Goals: renzo    Progress made toward(s) clinical / shift goals:        Problem: Knowledge Deficit - Standard  Goal: Patient and family/care givers will demonstrate understanding of plan of care, disease process/condition, diagnostic tests and medications  Outcome: Progressing     Problem: Optimal Care for Alcohol Withdrawal  Goal: Optimal Care for the alcohol withdrawal patient  Outcome: Progressing     Problem: Lifestyle Changes  Goal: Patient's ability to identify lifestyle changes and available resources to help reduce recurrence of condition will improve  Outcome: Progressing     Problem: Provide Safe Environment  Goal: Suicide environmental safety, protocols, policies, and practices will be implemented  Outcome: Progressing     Problem: Psychosocial  Goal: Patient's ability to identify and develop effective coping behaviors will improve  Outcome: Progressing     Problem: Psychosocial  Goal: Patient's ability to identify and utilize available support systems will improve  Outcome: Progressing     Problem: Pain - Standard  Goal: Alleviation of pain or a reduction in pain to the patient’s comfort goal  Outcome: Progressing     Problem: Skin Integrity  Goal: Skin integrity is maintained or improved  Outcome: Progressing     Problem: Fall Risk  Goal: Patient will remain free from falls  Outcome: Progressing       Patient is not progressing towards the following goals:      Problem: Psychosocial  Goal: Patient's level of anxiety will decrease  Outcome: Not Met

## 2023-02-14 NOTE — PROGRESS NOTES
4 Eyes Skin Assessment Completed by NILES Emery and NILES Cornell.    Head WDL  Ears WDL  Nose WDL  Mouth WDL  Neck WDL  Breast/Chest WDL  Shoulder Blades WDL  Spine WDL  (R) Arm/Elbow/Hand Bruising  (L) Arm/Elbow/Hand Bruising  Abdomen Bruising  Groin WDL  Scrotum/Coccyx/Buttocks WDL  (R) Leg Bruising and Swelling  (L) Leg Bruising  (R) Heel/Foot/Toe Bruising and Swelling  (L) Heel/Foot/Toe Bruising          Interventions In Place Pillows    Possible Skin Injury No    Pictures Uploaded Into Epic N/A  Wound Consult Placed N/A  RN Wound Prevention Protocol Ordered No

## 2023-02-14 NOTE — CARE PLAN
The patient is Stable - Low risk of patient condition declining or worsening    Shift Goals  Clinical Goals: safety, ciwa, manage pain and nausea  Patient Goals: manage pain and anxiety  Family Goals: renzo    Progress made toward(s) clinical / shift goals:        Problem: Knowledge Deficit - Standard  Goal: Patient and family/care givers will demonstrate understanding of plan of care, disease process/condition, diagnostic tests and medications  Outcome: Progressing     Problem: Lifestyle Changes  Goal: Patient's ability to identify lifestyle changes and available resources to help reduce recurrence of condition will improve  Outcome: Progressing     Problem: Provide Safe Environment  Goal: Suicide environmental safety, protocols, policies, and practices will be implemented  Outcome: Progressing     Problem: Psychosocial  Goal: Patient's ability to identify and develop effective coping behaviors will improve  Outcome: Progressing     Problem: Psychosocial  Goal: Patient's ability to identify and utilize available support systems will improve  Outcome: Progressing     Problem: Skin Integrity  Goal: Skin integrity is maintained or improved  Outcome: Progressing     Problem: Fall Risk  Goal: Patient will remain free from falls  Outcome: Progressing       Patient is not progressing towards the following goals:      Problem: Psychosocial  Goal: Patient's level of anxiety will decrease  Outcome: Not Met     Problem: Pain - Standard  Goal: Alleviation of pain or a reduction in pain to the patient’s comfort goal  Outcome: Not Met

## 2023-02-14 NOTE — PROGRESS NOTES
A&Ox4  RA  SI sitter  Complaints of anxiety and pain  CIWA and RASS scoring  Refuses boot, shoe at bedside - pt to try tonight or AM  Ice applied to foot  MD contacted, ortho to consult on R foot  Refuses bed alarm  HH assist to bathroom  Would like to appeal legal hold if to dc to inpatient psych facility, will update CM tomorrow.  states he can provide 24/7 care and take pt to outpt appts.    at bedside most of the day, updated on POC

## 2023-02-15 VITALS
HEART RATE: 87 BPM | HEIGHT: 68 IN | SYSTOLIC BLOOD PRESSURE: 126 MMHG | WEIGHT: 192.68 LBS | DIASTOLIC BLOOD PRESSURE: 79 MMHG | TEMPERATURE: 97.7 F | BODY MASS INDEX: 29.2 KG/M2 | OXYGEN SATURATION: 95 % | RESPIRATION RATE: 16 BRPM

## 2023-02-15 LAB
ALBUMIN SERPL BCP-MCNC: 3.9 G/DL (ref 3.2–4.9)
ALBUMIN/GLOB SERPL: 1.6 G/DL
ALP SERPL-CCNC: 63 U/L (ref 30–99)
ALT SERPL-CCNC: 32 U/L (ref 2–50)
ANION GAP SERPL CALC-SCNC: 7 MMOL/L (ref 7–16)
AST SERPL-CCNC: 25 U/L (ref 12–45)
BASOPHILS # BLD AUTO: 1.3 % (ref 0–1.8)
BASOPHILS # BLD: 0.05 K/UL (ref 0–0.12)
BILIRUB SERPL-MCNC: 0.5 MG/DL (ref 0.1–1.5)
BUN SERPL-MCNC: 12 MG/DL (ref 8–22)
CALCIUM ALBUM COR SERPL-MCNC: 9.2 MG/DL (ref 8.5–10.5)
CALCIUM SERPL-MCNC: 9.1 MG/DL (ref 8.5–10.5)
CHLORIDE SERPL-SCNC: 102 MMOL/L (ref 96–112)
CO2 SERPL-SCNC: 28 MMOL/L (ref 20–33)
CREAT SERPL-MCNC: 0.82 MG/DL (ref 0.5–1.4)
EOSINOPHIL # BLD AUTO: 0.09 K/UL (ref 0–0.51)
EOSINOPHIL NFR BLD: 2.3 % (ref 0–6.9)
ERYTHROCYTE [DISTWIDTH] IN BLOOD BY AUTOMATED COUNT: 57.2 FL (ref 35.9–50)
GFR SERPLBLD CREATININE-BSD FMLA CKD-EPI: 103 ML/MIN/1.73 M 2
GLOBULIN SER CALC-MCNC: 2.5 G/DL (ref 1.9–3.5)
GLUCOSE SERPL-MCNC: 96 MG/DL (ref 65–99)
HCT VFR BLD AUTO: 37.2 % (ref 37–47)
HGB BLD-MCNC: 11.9 G/DL (ref 12–16)
IMM GRANULOCYTES # BLD AUTO: 0.07 K/UL (ref 0–0.11)
IMM GRANULOCYTES NFR BLD AUTO: 1.8 % (ref 0–0.9)
LYMPHOCYTES # BLD AUTO: 1.9 K/UL (ref 1–4.8)
LYMPHOCYTES NFR BLD: 48.1 % (ref 22–41)
MAGNESIUM SERPL-MCNC: 2.2 MG/DL (ref 1.5–2.5)
MCH RBC QN AUTO: 29.2 PG (ref 27–33)
MCHC RBC AUTO-ENTMCNC: 32 G/DL (ref 33.6–35)
MCV RBC AUTO: 91.2 FL (ref 81.4–97.8)
MONOCYTES # BLD AUTO: 0.52 K/UL (ref 0–0.85)
MONOCYTES NFR BLD AUTO: 13.2 % (ref 0–13.4)
NEUTROPHILS # BLD AUTO: 1.32 K/UL (ref 2–7.15)
NEUTROPHILS NFR BLD: 33.3 % (ref 44–72)
NRBC # BLD AUTO: 0 K/UL
NRBC BLD-RTO: 0 /100 WBC
PHOSPHATE SERPL-MCNC: 5 MG/DL (ref 2.5–4.5)
PLATELET # BLD AUTO: 232 K/UL (ref 164–446)
PMV BLD AUTO: 9.2 FL (ref 9–12.9)
POTASSIUM SERPL-SCNC: 4.4 MMOL/L (ref 3.6–5.5)
PROT SERPL-MCNC: 6.4 G/DL (ref 6–8.2)
RBC # BLD AUTO: 4.08 M/UL (ref 4.2–5.4)
SODIUM SERPL-SCNC: 137 MMOL/L (ref 135–145)
WBC # BLD AUTO: 4 K/UL (ref 4.8–10.8)

## 2023-02-15 PROCEDURE — A9270 NON-COVERED ITEM OR SERVICE: HCPCS | Performed by: HOSPITALIST

## 2023-02-15 PROCEDURE — 700111 HCHG RX REV CODE 636 W/ 250 OVERRIDE (IP): Performed by: STUDENT IN AN ORGANIZED HEALTH CARE EDUCATION/TRAINING PROGRAM

## 2023-02-15 PROCEDURE — 84100 ASSAY OF PHOSPHORUS: CPT

## 2023-02-15 PROCEDURE — 83735 ASSAY OF MAGNESIUM: CPT

## 2023-02-15 PROCEDURE — 85025 COMPLETE CBC W/AUTO DIFF WBC: CPT

## 2023-02-15 PROCEDURE — 99231 SBSQ HOSP IP/OBS SF/LOW 25: CPT | Mod: GC | Performed by: PSYCHIATRY & NEUROLOGY

## 2023-02-15 PROCEDURE — A9270 NON-COVERED ITEM OR SERVICE: HCPCS | Performed by: STUDENT IN AN ORGANIZED HEALTH CARE EDUCATION/TRAINING PROGRAM

## 2023-02-15 PROCEDURE — 700111 HCHG RX REV CODE 636 W/ 250 OVERRIDE (IP): Performed by: INTERNAL MEDICINE

## 2023-02-15 PROCEDURE — 700102 HCHG RX REV CODE 250 W/ 637 OVERRIDE(OP): Performed by: HOSPITALIST

## 2023-02-15 PROCEDURE — 700102 HCHG RX REV CODE 250 W/ 637 OVERRIDE(OP): Performed by: STUDENT IN AN ORGANIZED HEALTH CARE EDUCATION/TRAINING PROGRAM

## 2023-02-15 PROCEDURE — 99239 HOSP IP/OBS DSCHRG MGMT >30: CPT | Performed by: STUDENT IN AN ORGANIZED HEALTH CARE EDUCATION/TRAINING PROGRAM

## 2023-02-15 PROCEDURE — 80053 COMPREHEN METABOLIC PANEL: CPT

## 2023-02-15 RX ORDER — OMEPRAZOLE 20 MG/1
20 CAPSULE, DELAYED RELEASE ORAL DAILY
Qty: 30 CAPSULE | Refills: 1 | Status: SHIPPED | OUTPATIENT
Start: 2023-02-16 | End: 2023-05-09

## 2023-02-15 RX ORDER — NICOTINE 21 MG/24HR
1 PATCH, TRANSDERMAL 24 HOURS TRANSDERMAL EVERY 24 HOURS
Qty: 30 PATCH | Refills: 0 | Status: SHIPPED | OUTPATIENT
Start: 2023-02-15 | End: 2023-05-09

## 2023-02-15 RX ORDER — OXYCODONE HYDROCHLORIDE 5 MG/1
5 TABLET ORAL EVERY 8 HOURS PRN
Qty: 15 TABLET | Refills: 0 | Status: SHIPPED | OUTPATIENT
Start: 2023-02-15 | End: 2023-02-20

## 2023-02-15 RX ORDER — QUETIAPINE FUMARATE 50 MG/1
50 TABLET, FILM COATED ORAL NIGHTLY
Qty: 60 TABLET | Refills: 3 | Status: SHIPPED | OUTPATIENT
Start: 2023-02-15 | End: 2023-07-24

## 2023-02-15 RX ADMIN — Medication 1 TABLET: at 06:24

## 2023-02-15 RX ADMIN — Medication 400 MG: at 06:24

## 2023-02-15 RX ADMIN — OXYCODONE HYDROCHLORIDE 5 MG: 5 TABLET ORAL at 00:13

## 2023-02-15 RX ADMIN — NICOTINE TRANSDERMAL SYSTEM 21 MG: 21 PATCH, EXTENDED RELEASE TRANSDERMAL at 06:00

## 2023-02-15 RX ADMIN — OMEPRAZOLE 20 MG: 20 CAPSULE, DELAYED RELEASE ORAL at 06:23

## 2023-02-15 RX ADMIN — ACETAMINOPHEN 650 MG: 325 TABLET, FILM COATED ORAL at 12:54

## 2023-02-15 RX ADMIN — Medication 1 TABLET: at 12:54

## 2023-02-15 RX ADMIN — LORAZEPAM 1 MG: 2 INJECTION INTRAMUSCULAR; INTRAVENOUS at 08:07

## 2023-02-15 RX ADMIN — OXYCODONE HYDROCHLORIDE 5 MG: 5 TABLET ORAL at 03:16

## 2023-02-15 RX ADMIN — ESCITALOPRAM OXALATE 10 MG: 10 TABLET ORAL at 06:23

## 2023-02-15 RX ADMIN — OXYCODONE HYDROCHLORIDE 5 MG: 5 TABLET ORAL at 06:23

## 2023-02-15 RX ADMIN — ONDANSETRON 4 MG: 4 TABLET, ORALLY DISINTEGRATING ORAL at 08:07

## 2023-02-15 RX ADMIN — SENNOSIDES AND DOCUSATE SODIUM 2 TABLET: 50; 8.6 TABLET ORAL at 06:24

## 2023-02-15 RX ADMIN — OXYCODONE HYDROCHLORIDE 5 MG: 5 TABLET ORAL at 10:38

## 2023-02-15 ASSESSMENT — LIFESTYLE VARIABLES
PAROXYSMAL SWEATS: NO SWEAT VISIBLE
AUDITORY DISTURBANCES: NOT PRESENT
TREMOR: TREMOR NOT VISIBLE BUT CAN BE FELT, FINGERTIP TO FINGERTIP
TOTAL SCORE: 6
HEADACHE, FULLNESS IN HEAD: NOT PRESENT
NAUSEA AND VOMITING: MILD NAUSEA WITH NO VOMITING
AGITATION: *
VISUAL DISTURBANCES: NOT PRESENT
ANXIETY: *
ORIENTATION AND CLOUDING OF SENSORIUM: ORIENTED AND CAN DO SERIAL ADDITIONS

## 2023-02-15 ASSESSMENT — PAIN DESCRIPTION - PAIN TYPE
TYPE: ACUTE PAIN
TYPE: ACUTE PAIN;CHRONIC PAIN

## 2023-02-15 ASSESSMENT — ENCOUNTER SYMPTOMS
CARDIOVASCULAR NEGATIVE: 1
DEPRESSION: 1
MUSCULOSKELETAL NEGATIVE: 1
RESPIRATORY NEGATIVE: 1

## 2023-02-15 NOTE — DISCHARGE PLANNING
Spoke to  Leif regarding patient's meeting with the court doctors today. Patient has been scheduled to meet with court doctors via telemedicine in the [address] room at 1000. Notified Charge NILES Lira and bedside NILES Emery of upcoming meeting this morning.

## 2023-02-15 NOTE — DISCHARGE PLANNING
Legal Hold    Referral: Legal Hold Court     Intervention: Pt met with court MD's via telemedicine monitor to contest the legal hold.     Court MD's released pt from legal hold. Pt is aware that cannot leave the hospital until Stipulation and Order releasing from hold is received.    Once court order is received will scan into pt's chart and notify bedside RN.

## 2023-02-15 NOTE — DISCHARGE SUMMARY
Discharge Summary    CHIEF COMPLAINT ON ADMISSION  Chief Complaint   Patient presents with    Legal 2000     BIB MS from home after pts  called PD because pt was attempting to crawl out a window and stated she wanted to die.        Reason for Admission  Suicidal ideation/attempt    Admission Date  2/8/2023    CODE STATUS  Full Code    HPI & HOSPITAL COURSE    Jessie Carney is a 22 y.o. female with history of alcohol abuse, MDD, PTSD admitted 2/8/2023 after her  called EMS due to altered mental status and suicidal ideation and attempting to harm herself by jumping out of a window.   Blood alcohol level elevated on admission 72.5. patient was placed on a legal hold. During her admit there were concerns of EtOH withdrawal  with worsening agitation and delirium and she required transfer to IM for higher level of care and Precedex drip. This has since been improved and stabilized and she was transferred to medical floor. Psych has been following and medications have been adjusted.   Patient had made an appeal of the legal hold and after meeting today via telemedicine with court she has been released from her legal hold. Patient and family have discharge plan set up to help her better succeed at home in addition she has follow up with outpatient psych scheduled.  At this time psych feels she is clear for discharge and from a medical standpoint she is doing well.   She was counseled on importance of abstaining from alcohol and regular follow up and resources should she have worsening anxiety, depression or thoughts of self harm.     She does have a closed avulsion fraction of metatarsal bone of the right foot and is in walking shoe. ortho follow up recommended and she was given information for this.       Therefore, she is discharged in fair and stable condition to home with close outpatient follow-up.    The patient met 2-midnight criteria for an inpatient stay at the time of discharge.    Discharge  Date  2/15/2023    FOLLOW UP ITEMS POST DISCHARGE  Psych follow up   Metatarsal fracture follow up     DISCHARGE DIAGNOSES  Principal Problem:    Alcohol withdrawal delirium, acute, hyperactive (HCC) POA: Yes  Active Problems:    Suicidal ideation POA: Unknown    Dehydration POA: Unknown    Intractable nausea and vomiting POA: Unknown    Closed avulsion fracture of metatarsal bone of right foot POA: Unknown    Overweight POA: Unknown    Cannabis use disorder POA: Unknown    Elevated liver enzymes POA: Unknown    Hyponatremia POA: Unknown    Suicide attempt (HCC) POA: Yes    Sore throat POA: Unknown    Thrombocytopenia (HCC) POA: Unknown  Resolved Problems:    * No resolved hospital problems. *      FOLLOW UP  No future appointments.  Krystian Jasso M.D.  [address] [facility] [id number] [phone]    In 1 week        MEDICATIONS ON DISCHARGE     Medication List        START taking these medications        Instructions   nicotine 21 MG/24HR Pt24  Commonly known as: NICODERM   Place 1 Patch on the skin every 24 hours.  Dose: 1 Patch     omeprazole 20 MG delayed-release capsule  Start taking on: February 16, 2023  Commonly known as: PRILOSEC   Take 1 Capsule by mouth every day.  Dose: 20 mg     oxyCODONE immediate-release 5 MG Tabs  Commonly known as: ROXICODONE   Take 1 Tablet by mouth every 8 hours as needed for Severe Pain for up to 5 days.  Dose: 5 mg     QUEtiapine 50 MG tablet  Commonly known as: Seroquel   Take 1 Tablet by mouth every evening.  Dose: 50 mg            CONTINUE taking these medications        Instructions   escitalopram 10 MG Tabs  Commonly known as: Lexapro   Take 10 mg by mouth every day.  Dose: 10 mg     ondansetron 8 MG Tbdp  Commonly known as: ZOFRAN ODT   Take 8 mg by mouth every 8 hours as needed for Nausea.  Dose: 8 mg            STOP taking these medications      hydrOXYzine pamoate 25 MG Caps  Commonly known as: VISTARIL     lamoTRIgine 25 MG Tabs  Commonly known as:  "LAMICTAL     potassium chloride SA 20 MEQ Tbcr  Commonly known as: Kdur     ziprasidone 20 MG Caps  Commonly known as: GEODON              Allergies  Allergies   Allergen Reactions    Lanolin Rash          Pcn [Penicillins] Rash     \"Family is allergic\"    Sulfa Drugs Rash             DIET  Orders Placed This Encounter   Procedures    Diet Order Diet: Regular; Tray Modifications (optional): No Sharps (Paperware)     Paperware only on meal tray.     Standing Status:   Standing     Number of Occurrences:   1     Order Specific Question:   Diet:     Answer:   Regular [1]     Order Specific Question:   Tray Modifications (optional)     Answer:   No Sharps (Paperware)       ACTIVITY  As tolerated.  Weight bearing as tolerated    CONSULTATIONS  Critical Care   Psychiatry     PROCEDURES  NA    LABORATORY  Lab Results   Component Value Date    SODIUM 137 02/15/2023    POTASSIUM 4.4 02/15/2023    CHLORIDE 102 02/15/2023    CO2 28 02/15/2023    GLUCOSE 96 02/15/2023    BUN 12 02/15/2023    CREATININE 0.82 02/15/2023        Lab Results   Component Value Date    WBC 4.0 (L) 02/15/2023    HEMOGLOBIN 11.9 (L) 02/15/2023    HEMATOCRIT 37.2 02/15/2023    PLATELETCT 232 02/15/2023        Total time of the discharge process exceeds 36 minutes.  "

## 2023-02-15 NOTE — DISCHARGE PLANNING
Alert Team Note:    [facility] to [facility]. Pt referral has been received and is being reviewed.

## 2023-02-15 NOTE — PROGRESS NOTES
D/c instructions given, educated on worsening s/s. Pt understands and questions answered. Pt agreeable to instructions, denies thoughts of harming self or others.

## 2023-02-15 NOTE — CARE PLAN
The patient is Watcher - Medium risk of patient condition declining or worsening    Shift Goals  Clinical Goals: safety, pain and anxiety mgmt  Patient Goals: sleep, pain mgmt  Family Goals: renzo    Progress made toward(s) clinical / shift goals:    Problem: Knowledge Deficit - Standard  Goal: Patient and family/care givers will demonstrate understanding of plan of care, disease process/condition, diagnostic tests and medications  Outcome: Progressing     Problem: Optimal Care for Alcohol Withdrawal  Goal: Optimal Care for the alcohol withdrawal patient  Outcome: Progressing     Problem: Pain - Standard  Goal: Alleviation of pain or a reduction in pain to the patient’s comfort goal  Outcome: Progressing     Problem: Skin Integrity  Goal: Skin integrity is maintained or improved  Outcome: Progressing       Patient is not progressing towards the following goals:

## 2023-02-15 NOTE — DISCHARGE PLANNING
Case Management Discharge Planning    Admission Date: 2/8/2023  GMLOS: 3.3  ALOS: 6    6-Clicks ADL Score: 24  6-Clicks Mobility Score: 24      Anticipated Discharge Dispo: Discharge Disposition: D/T to psychiatric distinct part unit of hosp w/planned hosp IP readmit (93)    DME Needed: No    Action(s) Taken: OTHER, ANA RN received notification pt is medically cleared for IP psych placement. ANA RN reached out to [facility], for placement. Per Carlota Barton is working on referrals for placement. [facility] requesting updated completed legal hold paperwork to be faxed to .    0914: Faxed completed paperwork to IP psych    Escalations Completed: None    Medically Clear: Yes    Next Steps: ANA RN to follow up with packet once pt has placement    Barriers to Discharge: Pending Placement    Is the patient up for discharge tomorrow: No

## 2023-02-15 NOTE — CONSULTS
"PSYCHIATRIC FOLLOW-UP:(established)  *Reason for admission:   Chief Complaint   Patient presents with    Legal 2000     BIB MS from home after pts  called PD because pt was attempting to crawl out a window and stated she wanted to die.      *Legal Hold Status: dismissed by court physicians      Chart reviewed.         No acute events overnight. Pt was seen by court physicians to contest her legal hold; she will be dismissed tomorrow.  present at bedside. They both feel that she is ready to come home, even today if possible. She has an intake appointment with [facility] at 2 pm tomorrow to set up appointments with both therapy and a psychiatrist. Pt's  will be available to constantly monitor her as he has a flexible job. They have removed potentially harmful weapons from the household in addition to locking up all medication. Pt denies SI or HI. Neither her or her  have any concerns at this time.     Medical ROS (as pertinent):     Review of Systems   Respiratory: Negative.     Cardiovascular: Negative.    Genitourinary: Negative.    Musculoskeletal: Negative.    Psychiatric/Behavioral:  Positive for depression. Negative for suicidal ideas.        *Psychiatric Examination:  Vitals:   Vitals:    02/15/23 1138   BP:    Pulse:    Resp: 16   Temp:    SpO2:      General Appearance: Appears stated age, calm and cooperative, laying in bed, wearing hospital gown  Abnormal Movements: no abnormal movements noted  Gait and Posture: no issues  Speech: Low volume, slowed  Thought processes: Linear  Associations: No loose associations  Abnormal or Psychotic Thoughts: Denies AVH, no paranoid delusions elicited  Judgement and Insight: Fair/fair  Orientation: Grossly oriented  Recent and Remote Memory: Appears intact  Attention Span and Concentration: Intact  Language: coherent and fluent english   Fund of Knowledge: Not tested  Mood and Affect: \"okay\" moderate range, appears " dysthymic  SI/HI: Denies    *Labs personally reviewed:   Recent Results (from the past 24 hour(s))   CBC WITH DIFFERENTIAL    Collection Time: 02/15/23  2:09 AM   Result Value Ref Range    WBC 4.0 (L) 4.8 - 10.8 K/uL    RBC 4.08 (L) 4.20 - 5.40 M/uL    Hemoglobin 11.9 (L) 12.0 - 16.0 g/dL    Hematocrit 37.2 37.0 - 47.0 %    MCV 91.2 81.4 - 97.8 fL    MCH 29.2 27.0 - 33.0 pg    MCHC 32.0 (L) 33.6 - 35.0 g/dL    RDW 57.2 (H) 35.9 - 50.0 fL    Platelet Count 232 164 - 446 K/uL    MPV 9.2 9.0 - 12.9 fL    Neutrophils-Polys 33.30 (L) 44.00 - 72.00 %    Lymphocytes 48.10 (H) 22.00 - 41.00 %    Monocytes 13.20 0.00 - 13.40 %    Eosinophils 2.30 0.00 - 6.90 %    Basophils 1.30 0.00 - 1.80 %    Immature Granulocytes 1.80 (H) 0.00 - 0.90 %    Nucleated RBC 0.00 /100 WBC    Neutrophils (Absolute) 1.32 (L) 2.00 - 7.15 K/uL    Lymphs (Absolute) 1.90 1.00 - 4.80 K/uL    Monos (Absolute) 0.52 0.00 - 0.85 K/uL    Eos (Absolute) 0.09 0.00 - 0.51 K/uL    Baso (Absolute) 0.05 0.00 - 0.12 K/uL    Immature Granulocytes (abs) 0.07 0.00 - 0.11 K/uL    NRBC (Absolute) 0.00 K/uL   Comp Metabolic Panel    Collection Time: 02/15/23  2:09 AM   Result Value Ref Range    Sodium 137 135 - 145 mmol/L    Potassium 4.4 3.6 - 5.5 mmol/L    Chloride 102 96 - 112 mmol/L    Co2 28 20 - 33 mmol/L    Anion Gap 7.0 7.0 - 16.0    Glucose 96 65 - 99 mg/dL    Bun 12 8 - 22 mg/dL    Creatinine 0.82 0.50 - 1.40 mg/dL    Calcium 9.1 8.5 - 10.5 mg/dL    AST(SGOT) 25 12 - 45 U/L    ALT(SGPT) 32 2 - 50 U/L    Alkaline Phosphatase 63 30 - 99 U/L    Total Bilirubin 0.5 0.1 - 1.5 mg/dL    Albumin 3.9 3.2 - 4.9 g/dL    Total Protein 6.4 6.0 - 8.2 g/dL    Globulin 2.5 1.9 - 3.5 g/dL    A-G Ratio 1.6 g/dL   Magnesium    Collection Time: 02/15/23  2:09 AM   Result Value Ref Range    Magnesium 2.2 1.5 - 2.5 mg/dL   Phosphorus    Collection Time: 02/15/23  2:09 AM   Result Value Ref Range    Phosphorus 5.0 (H) 2.5 - 4.5 mg/dL   CORRECTED CALCIUM    Collection Time: 02/15/23   2:09 AM   Result Value Ref Range    Correct Calcium 9.2 8.5 - 10.5 mg/dL   ESTIMATED GFR    Collection Time: 02/15/23  2:09 AM   Result Value Ref Range    GFR (CKD-EPI) 103 >60 mL/min/1.73 m 2       Current Facility-Administered Medications   Medication Dose Route Frequency Provider Last Rate Last Admin    QUEtiapine (Seroquel) tablet 50 mg  50 mg Oral Nightly Jenna Pascual M.D.   50 mg at 02/14/23 2044    escitalopram (Lexapro) tablet 10 mg  10 mg Oral DAILY Krystian Beavers D.O.   10 mg at 02/15/23 0623    calcium-vitamin D 250-3.125 MG-MCG tablet 1 Tablet  1 Tablet Oral TID KOTA RodriguesOJazzmine   1 Tablet at 02/15/23 0624    omeprazole (PRILOSEC) capsule 20 mg  20 mg Oral DAILY KOTA RodriguesOJazzmine   20 mg at 02/15/23 0623    rivaroxaban (XARELTO) tablet 10 mg  10 mg Oral DAILY AT 1800 KOTA RodriguesOJazzmine   10 mg at 02/14/23 1744    cloNIDine (CATAPRES) tablet 0.1 mg  0.1 mg Oral Q HOUR PRN Kevin Benson M.D.        haloperidol lactate (HALDOL) injection 1 mg  1 mg Intravenous Q4HRS PRN Kevin Benson M.D.   1 mg at 02/13/23 0010    senna-docusate (PERICOLACE or SENOKOT S) 8.6-50 MG per tablet 2 Tablet  2 Tablet Oral BID Kevin Benson M.D.   2 Tablet at 02/15/23 0624    And    polyethylene glycol/lytes (MIRALAX) PACKET 1 Packet  1 Packet Oral QDAY PRN Kevin Benson M.D.        And    magnesium hydroxide (MILK OF MAGNESIA) suspension 30 mL  30 mL Oral QDAY PRN Kevin Benson M.D.        And    bisacodyl (DULCOLAX) suppository 10 mg  10 mg Rectal QDAY PRN Kevin Benson M.D.        acetaminophen (Tylenol) tablet 650 mg  650 mg Oral Q6HRS PRN Kevin Benson M.D.        labetalol (NORMODYNE/TRANDATE) injection 10 mg  10 mg Intravenous Q4HRS PRN Kevin Benson M.D.        ondansetron (ZOFRAN) syringe/vial injection 4 mg  4 mg Intravenous Q4HRS PRN Kevin Benson M.D.   4 mg at 02/12/23 1618    ondansetron (ZOFRAN ODT) dispertab 4 mg  4 mg Oral Q4HRS PRN Kevin Benson M.D.   4 mg at 02/15/23 0807    promethazine  (PHENERGAN) tablet 12.5-25 mg  12.5-25 mg Oral Q4HRS PRN Kevin Benson M.D.   25 mg at 02/09/23 1149    promethazine (PHENERGAN) suppository 12.5-25 mg  12.5-25 mg Rectal Q4HRS PRN Kevin Benson M.D.        prochlorperazine (COMPAZINE) injection 5-10 mg  5-10 mg Intravenous Q4HRS PRN Kevin Benson M.D.        guaiFENesin dextromethorphan (ROBITUSSIN DM) 100-10 MG/5ML syrup 10 mL  10 mL Oral Q6HRS PRN Kevin Benson M.D.        nicotine (NICODERM) 21 MG/24HR 21 mg  21 mg Transdermal Daily-0600 Kevin Benson M.D.   21 mg at 02/15/23 0600    And    nicotine polacrilex (NICORETTE) 2 MG piece 2 mg  2 mg Oral Q HOUR PRN Kevin Benson M.D.        Pharmacy Consult Request ...Pain Management Review 1 Each  1 Each Other PHARMACY TO DOSE Kevin Benson M.D.        oxyCODONE immediate-release (ROXICODONE) tablet 2.5 mg  2.5 mg Oral Q3HRS PRN Kevin Benson M.D.        Or    oxyCODONE immediate-release (ROXICODONE) tablet 5 mg  5 mg Oral Q3HRS PRN Kevin Benson M.D.   5 mg at 02/15/23 1038    Or    morphine 4 MG/ML injection 2 mg  2 mg Intravenous Q3HRS PRN Kevin Benson M.D.   2 mg at 02/12/23 2150    LORazepam (ATIVAN) injection 1-2 mg  1-2 mg Intravenous Q2HRS PRN Chris Goyal M.D.   1 mg at 02/15/23 0807     Assessment:  Pt was dismissed by court physicians today; she is scheduled to be discharged tomorrow. She continued to deny SI and has not shown any suicidal gestures since being hospitalized. Pt and  have no concerns with her going home today. They have safety measures in place: removing all potential weapons from the household, removing alcohol from the household, constant home supervision and close follow up. Pt has an appointment scheduled for 2PM tomorrow at [facility] for psychotherapy and psychiatry. Pt has completed a safety plan. Given this information, from a psychiatry point of view, Pt is okay to be discharged today. There is no risk for safety and family is agreeable.     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 daily for depression; please discharge with only a 14 day supply   3- Psychiatry will sign off    Thank you for the consult.     No restrictions to the following     Sitter  Phone  Visitors  Personal belongings

## 2023-02-15 NOTE — DISCHARGE PLANNING
Alert Team Note:    Faxed updated progress and psych notes to [facility] as requested by [place].

## 2023-05-09 ENCOUNTER — GYNECOLOGY VISIT (OUTPATIENT)
Dept: OBGYN | Facility: CLINIC | Age: 23
End: 2023-05-09
Payer: COMMERCIAL

## 2023-05-09 VITALS — WEIGHT: 202 LBS | SYSTOLIC BLOOD PRESSURE: 112 MMHG | DIASTOLIC BLOOD PRESSURE: 68 MMHG | BODY MASS INDEX: 30.71 KG/M2

## 2023-05-09 DIAGNOSIS — N91.1 SECONDARY AMENORRHEA: ICD-10-CM

## 2023-05-09 DIAGNOSIS — Z86.59 HISTORY OF ANXIETY: ICD-10-CM

## 2023-05-09 PROBLEM — E86.0 DEHYDRATION: Status: RESOLVED | Noted: 2023-02-09 | Resolved: 2023-05-09

## 2023-05-09 PROBLEM — R11.2 INTRACTABLE NAUSEA AND VOMITING: Status: RESOLVED | Noted: 2023-02-09 | Resolved: 2023-05-09

## 2023-05-09 PROBLEM — J02.9 SORE THROAT: Status: RESOLVED | Noted: 2023-02-11 | Resolved: 2023-05-09

## 2023-05-09 PROBLEM — D69.6 THROMBOCYTOPENIA (HCC): Status: RESOLVED | Noted: 2023-02-13 | Resolved: 2023-05-09

## 2023-05-09 PROBLEM — E66.3 OVERWEIGHT: Status: RESOLVED | Noted: 2023-02-10 | Resolved: 2023-05-09

## 2023-05-09 PROBLEM — R45.851 SUICIDAL IDEATION: Status: RESOLVED | Noted: 2023-02-09 | Resolved: 2023-05-09

## 2023-05-09 PROBLEM — E87.1 HYPONATREMIA: Status: RESOLVED | Noted: 2023-02-10 | Resolved: 2023-05-09

## 2023-05-09 PROBLEM — R74.8 ELEVATED LIVER ENZYMES: Status: RESOLVED | Noted: 2023-02-10 | Resolved: 2023-05-09

## 2023-05-09 PROCEDURE — 99203 OFFICE O/P NEW LOW 30 MIN: CPT | Performed by: ADVANCED PRACTICE MIDWIFE

## 2023-05-09 PROCEDURE — 76705 ECHO EXAM OF ABDOMEN: CPT | Mod: 26 | Performed by: ADVANCED PRACTICE MIDWIFE

## 2023-05-09 ASSESSMENT — FIBROSIS 4 INDEX: FIB4 SCORE: 0.44

## 2023-05-09 NOTE — PROGRESS NOTES
Jessie Carney is a 22 y.o. female who presents for         HPI Comments: Pt presents for .  Patient's last menstrual period was 02/05/2023 (exact date).. She reports she is sure of last menstral period. She was  tracking her period. She reports that she took a pregnancy test 5 days after missed menses and it was negative. She started feeling sick a week later and took another test showing positive.    Did have nausea or vomiting but resolving.   Denies presence of headaches.    Jessie did have two inpatient mental health stays in past 6 months. 12/2022 and 2/2023 but this was complicated by alcohol abuse. She is now sober. With her most recent stay, she was discharged on seroquel that was working for her but she stopped with positive pregnancy test. She has questions on if she should restart it.       Review of Systems   Pertinent positives documented in HPI and all other systems reviewed & are negative      Past Medical History:   Diagnosis Date    Anxiety     Asthma     Dx at age 1. not on meds    Cannabis use disorder 02/10/2023    Depression     Intractable nausea and vomiting 02/09/2023    Migraine     Seizure (HCC)        Medications:   Current Outpatient Medications Ordered in Epic   Medication Sig Dispense Refill    QUEtiapine (SEROQUEL) 50 MG tablet Take 1 Tablet by mouth every evening. (Patient not taking: Reported on 5/9/2023) 60 Tablet 3     No current Epic-ordered facility-administered medications on file.          Objective:   Vital measurements:  /68   Wt 202 lb   Body mass index is 30.71 kg/m². (Goal BM I>18 <25)    Physical Exam   Nursing note and vitals reviewed.  Constitutional: She is oriented to person, place, and time. She appears well-developed and well-nourished. No distress.     Genitourinary:  Uterus size of 12   No vaginal bleeding or discharge noted.     Musculoskeletal: Normal range of motion. She exhibits no edema and no tenderness.     Skin: Skin is warm and dry. No  rash noted. She is not diaphoretic. No erythema. No pallor.     Psychiatric: She has a normal mood and affect. Her behavior is normal. Judgment and thought content normal.     Transabdominal Ultrasound  Indication:     Findings:  CRL: 4.97 5.00 4.86      FHR: 156 bpm    Impression: Single intrauterine pregnancy measuring 11 weeks and 5 days with positive cardiac activity.     Per ACOG dating guidelines, final ABBEY is 11/23/2023 based on US today.    Ultrasound performed and read by myself.          Assessment:     1. Secondary amenorrhea        2. History of anxiety and depression            Plan:   1. Discussed importance of prenatal vitamins with patient. Encouraged daily use. Change in due date reviewed with Jessie.   2. Regarding her stopping her seroquel, I have recommended that she return to use if she is comfortable. We discussed organ formation and that after that time, limited insult from medication. Risk of destabilizing her mood puts her at higher risk for adverse outcome. She voices understanding.   3. Follow up in 1-3 weeks for NOB visit.

## 2023-05-09 NOTE — PROGRESS NOTES
Pt here for DUB.    Pt states no complaints   Good# [phone]  LMP: 2/5/2023, 13w2d today.   Pharmacy confirmed

## 2023-06-29 ENCOUNTER — APPOINTMENT (OUTPATIENT)
Dept: OBGYN | Facility: CLINIC | Age: 23
End: 2023-06-29
Payer: COMMERCIAL

## 2023-07-03 ENCOUNTER — INITIAL PRENATAL (OUTPATIENT)
Dept: OBGYN | Facility: CLINIC | Age: 23
End: 2023-07-03
Payer: COMMERCIAL

## 2023-07-03 ENCOUNTER — HOSPITAL ENCOUNTER (OUTPATIENT)
Facility: MEDICAL CENTER | Age: 23
End: 2023-07-03
Attending: NURSE PRACTITIONER
Payer: COMMERCIAL

## 2023-07-03 VITALS — WEIGHT: 198 LBS | BODY MASS INDEX: 30.11 KG/M2 | SYSTOLIC BLOOD PRESSURE: 116 MMHG | DIASTOLIC BLOOD PRESSURE: 74 MMHG

## 2023-07-03 DIAGNOSIS — O09.93 ENCOUNTER FOR SUPERVISION OF HIGH RISK PREGNANCY IN THIRD TRIMESTER, ANTEPARTUM: Primary | ICD-10-CM

## 2023-07-03 DIAGNOSIS — O09.93 ENCOUNTER FOR SUPERVISION OF HIGH RISK PREGNANCY IN THIRD TRIMESTER, ANTEPARTUM: ICD-10-CM

## 2023-07-03 PROCEDURE — 87591 N.GONORRHOEAE DNA AMP PROB: CPT

## 2023-07-03 PROCEDURE — 87491 CHLMYD TRACH DNA AMP PROBE: CPT

## 2023-07-03 PROCEDURE — 3078F DIAST BP <80 MM HG: CPT | Performed by: NURSE PRACTITIONER

## 2023-07-03 PROCEDURE — 88175 CYTOPATH C/V AUTO FLUID REDO: CPT

## 2023-07-03 PROCEDURE — 3074F SYST BP LT 130 MM HG: CPT | Performed by: NURSE PRACTITIONER

## 2023-07-03 PROCEDURE — 0500F INITIAL PRENATAL CARE VISIT: CPT | Performed by: NURSE PRACTITIONER

## 2023-07-03 PROCEDURE — 87624 HPV HI-RISK TYP POOLED RSLT: CPT

## 2023-07-03 ASSESSMENT — ENCOUNTER SYMPTOMS
CARDIOVASCULAR NEGATIVE: 1
EYES NEGATIVE: 1
PSYCHIATRIC NEGATIVE: 1
GASTROINTESTINAL NEGATIVE: 1
CONSTITUTIONAL NEGATIVE: 1
NEUROLOGICAL NEGATIVE: 1
MUSCULOSKELETAL NEGATIVE: 1
RESPIRATORY NEGATIVE: 1

## 2023-07-03 ASSESSMENT — EDINBURGH POSTNATAL DEPRESSION SCALE (EPDS)
I HAVE BEEN SO UNHAPPY THAT I HAVE BEEN CRYING: ONLY OCCASIONALLY
I HAVE BLAMED MYSELF UNNECESSARILY WHEN THINGS WENT WRONG: YES, SOME OF THE TIME
I HAVE BEEN ABLE TO LAUGH AND SEE THE FUNNY SIDE OF THINGS: NOT QUITE SO MUCH NOW
I HAVE BEEN ANXIOUS OR WORRIED FOR NO GOOD REASON: YES, SOMETIMES
I HAVE BEEN SO UNHAPPY THAT I HAVE HAD DIFFICULTY SLEEPING: NOT VERY OFTEN
I HAVE LOOKED FORWARD WITH ENJOYMENT TO THINGS: AS MUCH AS I EVER DID
I HAVE FELT SAD OR MISERABLE: NOT VERY OFTEN
THINGS HAVE BEEN GETTING ON TOP OF ME: YES, SOMETIMES I HAVEN'T BEEN COPING AS WELL AS USUAL
I HAVE FELT SCARED OR PANICKY FOR NO GOOD REASON: YES, SOMETIMES
THE THOUGHT OF HARMING MYSELF HAS OCCURRED TO ME: NEVER
TOTAL SCORE: 12

## 2023-07-03 ASSESSMENT — FIBROSIS 4 INDEX: FIB4 SCORE: 0.44

## 2023-07-03 NOTE — PROGRESS NOTES
Subjective     S:  Jessie Carney is a 22 y.o.  female  @ She is 19w4d with an ABBEY of Estimated Date of Delivery: 23 based off of US who presents for her new OB exam.      Her OB hx includes SAB x8, followed by  x2.   History of HSV I or II in self or partner: no  History of STIs in self or partner: no  History of Thyroid problems: no  History of ETOH abuse, last used   History of depression/anxiety prescribed Serequel, Pt does not want to continue taking, states she felt better on Lexapro    No ER visits or previous care in this pregnancy. She has no complaints.  Declines AFP.  Desires NIPT, declines CF.  Reports positive FM, no VB, LOF, or cramping.  Denies dysuria, vaginal DC.  Pt is  and lives with family. FOB is involved. She is currently trying to cut back on smoing,  no heavy lifting, no chemical exposure.  Pregnancy is unplannd but welcomed.    O:    Vitals:    23 1015   BP: 116/74   Weight: 198 lb    See H&P Prenatal Physical.  Wet mount: not indicated        FHTs: 145        Fundal ht: 19cm     A:   1.  IUP @ 19w4d ABBEY: Estimated Date of Delivery: 23 per US         2.  S=D        3.    Patient Active Problem List    Diagnosis Date Noted    Encounter for supervision of high risk pregnancy in third trimester, antepartum 2023    Suicide attempt (HCC) 2023    Cannabis use disorder 02/10/2023    Alcohol withdrawal delirium, acute, hyperactive (HCC) 2023    Closed avulsion fracture of metatarsal bone of right foot 2023    History of anxiety and depression 2019         P:  1.  GC/CT today. Pap today.         2.  Prenatal labs and UDS ordered - lab slip given        3.  Discussed diet and exercise during pregnancy. Encouraged good nutrition, and daily exercise including walking or swimming. Discussed expected weight gain during pregnancy.              4.  Discussed adequate hydration during pregnancy.        5.  NOB packet given         6.  Return to office in 4 wks        7.  Complete OB US in 1 wks        8.  Pregnancy guide provided        9.  MFM referral      HPI    Review of Systems   Constitutional: Negative.    HENT: Negative.     Eyes: Negative.    Respiratory: Negative.     Cardiovascular: Negative.    Gastrointestinal: Negative.    Genitourinary: Negative.    Musculoskeletal: Negative.    Skin: Negative.    Neurological: Negative.    Endo/Heme/Allergies: Negative.    Psychiatric/Behavioral: Negative.                Objective     /74   Wt 198 lb   LMP 02/05/2023 (Exact Date)   BMI 30.11 kg/m²      Physical Exam  Vitals and nursing note reviewed.   Constitutional:       Appearance: She is well-developed.   Cardiovascular:      Rate and Rhythm: Normal rate and regular rhythm.      Heart sounds: Normal heart sounds.   Pulmonary:      Effort: Pulmonary effort is normal.      Breath sounds: Normal breath sounds.   Abdominal:      Palpations: Abdomen is soft.   Genitourinary:     Vagina: Normal.      Comments: Uterus enlarged, c/w 19 wk ga  Musculoskeletal:         General: Normal range of motion.      Cervical back: Normal range of motion and neck supple.   Skin:     General: Skin is warm and dry.   Neurological:      Mental Status: She is alert and oriented to person, place, and time.      Deep Tendon Reflexes: Reflexes are normal and symmetric.   Psychiatric:         Behavior: Behavior normal.         Thought Content: Thought content normal.         Judgment: Judgment normal.                             Assessment & Plan        1. Encounter for supervision of high risk pregnancy in third trimester, antepartum    - PREG CNTR PRENATAL PN; Future  - US-OB 2ND 3RD TRI COMPLETE; Future  - URINE DRUG SCREEN W/CONF (AR); Future  - THINPREP RFLX HPV ASCUS W/CTNG; Future  - NIPT FOR FETAL ANEUPLOIDY (PANORAMA) WITH MICRODELETIONS; Future  - Referral to Perinatology

## 2023-07-03 NOTE — PROGRESS NOTES
Patient here for new OB   LMP= 2/5/23  ABBEY= 11/23/23  GA= 19w4d  Last pap   Phone number: [phone]  Pharmacy verified

## 2023-07-05 LAB
C TRACH DNA GENITAL QL NAA+PROBE: NEGATIVE
CYTOLOGY REG CYTOL: NORMAL
N GONORRHOEA DNA GENITAL QL NAA+PROBE: NEGATIVE
SPECIMEN SOURCE: NORMAL

## 2023-07-07 ENCOUNTER — APPOINTMENT (OUTPATIENT)
Dept: RADIOLOGY | Facility: IMAGING CENTER | Age: 23
End: 2023-07-07
Attending: NURSE PRACTITIONER
Payer: COMMERCIAL

## 2023-07-07 ENCOUNTER — HOSPITAL ENCOUNTER (OUTPATIENT)
Dept: LAB | Facility: MEDICAL CENTER | Age: 23
End: 2023-07-07
Attending: NURSE PRACTITIONER
Payer: COMMERCIAL

## 2023-07-07 DIAGNOSIS — O09.93 ENCOUNTER FOR SUPERVISION OF HIGH RISK PREGNANCY IN THIRD TRIMESTER, ANTEPARTUM: ICD-10-CM

## 2023-07-07 LAB
ABO GROUP BLD: NORMAL
BLD GP AB SCN SERPL QL: NORMAL
HPV HR 12 DNA CVX QL NAA+PROBE: NEGATIVE
HPV16 DNA SPEC QL NAA+PROBE: NEGATIVE
HPV18 DNA SPEC QL NAA+PROBE: NEGATIVE
RH BLD: NORMAL
SPECIMEN SOURCE: NORMAL

## 2023-07-07 PROCEDURE — 76805 OB US >/= 14 WKS SNGL FETUS: CPT | Mod: TC | Performed by: NURSE PRACTITIONER

## 2023-07-07 PROCEDURE — 86901 BLOOD TYPING SEROLOGIC RH(D): CPT

## 2023-07-07 PROCEDURE — 87389 HIV-1 AG W/HIV-1&-2 AB AG IA: CPT

## 2023-07-07 PROCEDURE — 36415 COLL VENOUS BLD VENIPUNCTURE: CPT

## 2023-07-07 PROCEDURE — 86762 RUBELLA ANTIBODY: CPT

## 2023-07-07 PROCEDURE — 86780 TREPONEMA PALLIDUM: CPT

## 2023-07-07 PROCEDURE — 85027 COMPLETE CBC AUTOMATED: CPT

## 2023-07-07 PROCEDURE — 86803 HEPATITIS C AB TEST: CPT

## 2023-07-07 PROCEDURE — 86850 RBC ANTIBODY SCREEN: CPT

## 2023-07-07 PROCEDURE — 86900 BLOOD TYPING SEROLOGIC ABO: CPT

## 2023-07-07 PROCEDURE — 87340 HEPATITIS B SURFACE AG IA: CPT

## 2023-07-08 DIAGNOSIS — R87.613 HGSIL (HIGH GRADE SQUAMOUS INTRAEPITHELIAL LESION) ON PAP SMEAR OF CERVIX: ICD-10-CM

## 2023-07-08 PROBLEM — Z28.39 RUBELLA NON-IMMUNE STATUS, ANTEPARTUM: Status: ACTIVE | Noted: 2023-07-08

## 2023-07-08 PROBLEM — O09.899 RUBELLA NON-IMMUNE STATUS, ANTEPARTUM: Status: ACTIVE | Noted: 2023-07-08

## 2023-07-08 LAB
ERYTHROCYTE [DISTWIDTH] IN BLOOD BY AUTOMATED COUNT: 43.9 FL (ref 35.9–50)
HBV SURFACE AG SER QL: ABNORMAL
HCT VFR BLD AUTO: 34 % (ref 37–47)
HCV AB SER QL: ABNORMAL
HGB BLD-MCNC: 11.5 G/DL (ref 12–16)
HIV 1+2 AB+HIV1 P24 AG SERPL QL IA: NORMAL
MCH RBC QN AUTO: 30.1 PG (ref 27–33)
MCHC RBC AUTO-ENTMCNC: 33.8 G/DL (ref 32.2–35.5)
MCV RBC AUTO: 89 FL (ref 81.4–97.8)
PLATELET # BLD AUTO: 261 K/UL (ref 164–446)
PMV BLD AUTO: 9.7 FL (ref 9–12.9)
RBC # BLD AUTO: 3.82 M/UL (ref 4.2–5.4)
RUBV AB SER QL: 9.87 IU/ML
T PALLIDUM AB SER QL IA: ABNORMAL
WBC # BLD AUTO: 6.7 K/UL (ref 4.8–10.8)

## 2023-07-10 ENCOUNTER — TELEPHONE (OUTPATIENT)
Dept: OBGYN | Facility: CLINIC | Age: 23
End: 2023-07-10
Payer: COMMERCIAL

## 2023-07-10 NOTE — TELEPHONE ENCOUNTER
----- Message from ROYER Velasquez sent at 7/8/2023  7:48 AM PDT -----  Abnormal pap, needs colpo-referral sent      Pt notified of abnormal pap and need colposcopy. Procedure explained to pt in detail. Colpo scheduled for 7/31/23 at 0900 with Dr. Lindsey. All questions answered. Pt agreed and verbalized understanding.

## 2023-07-24 ENCOUNTER — ROUTINE PRENATAL (OUTPATIENT)
Dept: OBGYN | Facility: CLINIC | Age: 23
End: 2023-07-24
Payer: COMMERCIAL

## 2023-07-24 VITALS — SYSTOLIC BLOOD PRESSURE: 112 MMHG | WEIGHT: 206 LBS | BODY MASS INDEX: 31.32 KG/M2 | DIASTOLIC BLOOD PRESSURE: 70 MMHG

## 2023-07-24 DIAGNOSIS — Z86.59 HISTORY OF ANXIETY: ICD-10-CM

## 2023-07-24 DIAGNOSIS — Z34.82 ENCOUNTER FOR SUPERVISION OF OTHER NORMAL PREGNANCY IN SECOND TRIMESTER: ICD-10-CM

## 2023-07-24 PROCEDURE — 0502F SUBSEQUENT PRENATAL CARE: CPT | Performed by: ADVANCED PRACTICE MIDWIFE

## 2023-07-24 PROCEDURE — 3078F DIAST BP <80 MM HG: CPT | Performed by: ADVANCED PRACTICE MIDWIFE

## 2023-07-24 PROCEDURE — 3074F SYST BP LT 130 MM HG: CPT | Performed by: ADVANCED PRACTICE MIDWIFE

## 2023-07-24 RX ORDER — QUETIAPINE FUMARATE 50 MG/1
1 TABLET, EXTENDED RELEASE ORAL
Qty: 30 TABLET | Refills: 1 | Status: SHIPPED | OUTPATIENT
Start: 2023-07-24 | End: 2023-08-02

## 2023-07-24 ASSESSMENT — FIBROSIS 4 INDEX: FIB4 SCORE: 0.39

## 2023-07-24 NOTE — PROGRESS NOTES
Has patient been referred to outside provider?  Yes; MFM   Records available on chart?   Appt this week    SUBJECTIVE:  Pt is a 22 y.o.   at 22w4d  gestation. Presents today for follow-up prenatal care. Has not been seen in ER or L & D since last visit. Reports good  fetal movement.     Leaking of fluid?       no    Dysuria?       no    Headaches?      no    N/V?          no    Cramping/contractions?      no    Patient reporting today extreme fatigue. She is reporting that she is not sleeping well but she is tired whether she sleeps 3 hours or 10 hours. 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 Partner is working during the day.  Her family was surprised with pregnancy announcement but is supportive. Her grandmother is the only family that is here locally.       Would like to discuss colpo that is scheduled for next week. Patient reports she was told she would need hysterectomy due to endometriosis and adenomyosis two years ago by her OB in [place].     OBJECTIVE:   /70   Wt 206 lb   LMP 2023 (Exact Date)   BMI 31.32 kg/m²   Patients' weight gain, fluid intake and exercise level discussed.  Vitals, fundal height , fetal position, and FHR reviewed on flowsheet    Lab:No results found for this or any previous visit (from the past 336 hour(s)).    ASSESSMENT/ PLAN:   - IUP at 22w4d    - S = D   -   Patient Active Problem List    Diagnosis Date Noted    Alcohol withdrawal delirium, acute, hyperactive (HCC) 2023    Suicide attempt (HCC) 2023    Cannabis use disorder 02/10/2023    HGSIL (high grade squamous intraepithelial lesion) on Pap smear of cervix 2023    Rubella non-immune status, antepartum 2023    Encounter for supervision of high risk pregnancy in third trimester, antepartum 2023    Closed avulsion fracture of metatarsal bone of right foot 2023    History of anxiety and depression 2019       Keep appointment with MFM    Discussed pap tests and  abnormal testing. We discussed expectations with colposcopy including biopsies and deferral of endocervical sampling.     Discussed restarting seroquel low dose 25mg QHS. She agrees. Can do mental health intake formally if appropriate. Otherwise, can refer to [facility].     - S/sx pregnancy and labor warning signs vs general discomforts discussed  - Fetal movements and kick counts reviewed. Adequate hydration reinforced.  - Anticipatory guidance provided.   - RTC in 4 weeks for routine prenatal care., 1 week for colpo.

## 2023-07-24 NOTE — PROGRESS NOTES
Pt here today for OB follow up  Pt states she is very fatigued   Reports +FM  Good #[phone]  Pharmacy Confirmed.  Chaperone offered and not indicated.   Pt states she did NIPT about two weeks ago, camilla damon in [facility].

## 2023-07-27 ENCOUNTER — OFFICE VISIT (OUTPATIENT)
Dept: OBGYN | Facility: CLINIC | Age: 23
End: 2023-07-27
Payer: COMMERCIAL

## 2023-07-27 VITALS — WEIGHT: 209.4 LBS | BODY MASS INDEX: 31.84 KG/M2 | SYSTOLIC BLOOD PRESSURE: 112 MMHG | DIASTOLIC BLOOD PRESSURE: 68 MMHG

## 2023-07-27 DIAGNOSIS — F10.188 ALCOHOL ABUSE WITH ALCOHOL-INDUCED MENTAL DISORDER (HCC): ICD-10-CM

## 2023-07-27 DIAGNOSIS — Z82.79 FAMILY HISTORY OF CONGENITAL HEART DEFECT: ICD-10-CM

## 2023-07-27 PROCEDURE — 99203 OFFICE O/P NEW LOW 30 MIN: CPT | Performed by: OBSTETRICS & GYNECOLOGY

## 2023-07-27 PROCEDURE — 3074F SYST BP LT 130 MM HG: CPT | Performed by: OBSTETRICS & GYNECOLOGY

## 2023-07-27 PROCEDURE — 3078F DIAST BP <80 MM HG: CPT | Performed by: OBSTETRICS & GYNECOLOGY

## 2023-07-27 ASSESSMENT — FIBROSIS 4 INDEX: FIB4 SCORE: 0.41

## 2023-07-31 ENCOUNTER — HOSPITAL ENCOUNTER (OUTPATIENT)
Facility: MEDICAL CENTER | Age: 23
End: 2023-07-31
Attending: OBSTETRICS & GYNECOLOGY
Payer: COMMERCIAL

## 2023-07-31 ENCOUNTER — GYNECOLOGY VISIT (OUTPATIENT)
Dept: OBGYN | Facility: CLINIC | Age: 23
End: 2023-07-31
Payer: COMMERCIAL

## 2023-07-31 VITALS — WEIGHT: 207.4 LBS | BODY MASS INDEX: 31.54 KG/M2 | DIASTOLIC BLOOD PRESSURE: 58 MMHG | SYSTOLIC BLOOD PRESSURE: 104 MMHG

## 2023-07-31 DIAGNOSIS — R87.611 ATYPICAL SQUAMOUS CELLS CANNOT EXCLUDE HIGH GRADE SQUAMOUS INTRAEPITHELIAL LESION ON CYTOLOGIC SMEAR OF CERVIX (ASC-H): ICD-10-CM

## 2023-07-31 DIAGNOSIS — R87.613 HGSIL (HIGH GRADE SQUAMOUS INTRAEPITHELIAL LESION) ON PAP SMEAR OF CERVIX: Primary | ICD-10-CM

## 2023-07-31 LAB — PATHOLOGY CONSULT NOTE: NORMAL

## 2023-07-31 PROCEDURE — 57454 BX/CURETT OF CERVIX W/SCOPE: CPT | Performed by: OBSTETRICS & GYNECOLOGY

## 2023-07-31 PROCEDURE — 88305 TISSUE EXAM BY PATHOLOGIST: CPT

## 2023-07-31 PROCEDURE — 3074F SYST BP LT 130 MM HG: CPT | Performed by: OBSTETRICS & GYNECOLOGY

## 2023-07-31 PROCEDURE — 3078F DIAST BP <80 MM HG: CPT | Performed by: OBSTETRICS & GYNECOLOGY

## 2023-07-31 PROCEDURE — 88342 IMHCHEM/IMCYTCHM 1ST ANTB: CPT

## 2023-07-31 ASSESSMENT — FIBROSIS 4 INDEX: FIB4 SCORE: 0.41

## 2023-07-31 NOTE — PROCEDURES
Jessie Carney  23 y.o.  Female  here today for colposcopy to evaluate her for abnormal pap:     Colposcopy    Indication:  ASC-H    Prior to beginning the procedure, risk and benefits of a colposcopy with possibility of biopsies were discussed including the risk of infection, bleeding, and pain. Patient understands the risks associated with the procedure, questions have been answered and consents have been signed to the chart.    Procedure in detail:    Speculum is placed and cervix is visualized. The cervix is cleansed with dilute acetic acid solution.     Physical Exam  Genitourinary:              Satisfactory: YES   Squamo-columnar junction seen: YES   Entire lesion seen: YES   Biopsies done: YES   Biopsy site:  12 o clock  ECC: no- pregnant    Impression:  23 y.o.  here for colposcopy for ASC-H.   -  before procedure and after procedure. Biopsy taken for acetowhite chnges and mosaicism. EBL 50 cc.   Hemostasis achieved quickly with Monsels.     Recommendations:  - prn results

## 2023-08-03 NOTE — PROGRESS NOTES
This is a 23  year old  Ab2 referred for consultation only regarding hx of depression, Seroquel use in pregnancy and possible alcohol exposure.  She report she stopped drinking 2023.      PMHx:   Depression: Seroquel  Increased LFTs (2023): during detoxification  Possible liver cirrhosis  Denies hx of asthma, seizures, hypertension, diabetes, other CV, respiratory, GI,  or endocrine disorders.       OPERATIONS: None       OB HX:    Term female, 8-2, , induced for PPROM.   37 weeks male, 7-15, , possible PFO v. ASD.      TRANSFUSIONS:  Denies       ALLERGIES:  Denies       HABITS: None       MEDS: Seroquel      VD:  Denies hx of GC, syphilis, chlamydia, HPV or herpes       DISCUSSION:   Based on the working EDC of 23, she had stopped using alcohol about 3 weeks prior to the conception of the fetus.   If this is the situation, the fetus was not exposed to the alcohol and therefore not at risk.    SEROQUEL:  Seroquel has not been associated with an increase risk of fetal anomalies.    POSSIBLE LIVER CIRRHOSIS:  We discussed liver cirrhosis is associated with an increase risk of maternal complications especially in the presence of esophageal varices.   Evaluation should include serum bilirubin, aminotransferases, gamma-glutamyl transpeptidase, prothrombin time international normalized ratio (INR), electrolytes, albumin, and platelets.  Consultation with gastroenterology would also be helpful.    CHILD WITH PFO/ASD:  We discussed it would be helpful to determine the diagnosis of her child.  If there is an ASD, there would be an increase risk of congenital heart defects in this pregnancy and therefore an echocardiography would be indicated.    HX OF ALCOHOL USE:  It is not clear as to the duration of her alcohol use.  It would be reasonable to consider a maternal echocardiography to rule out cardiomyopathy.    Detailed consultation with low complexity decision making.

## 2023-08-24 ENCOUNTER — ROUTINE PRENATAL (OUTPATIENT)
Dept: OBGYN | Facility: CLINIC | Age: 23
End: 2023-08-24
Payer: COMMERCIAL

## 2023-08-24 VITALS — BODY MASS INDEX: 32.08 KG/M2 | WEIGHT: 211 LBS | DIASTOLIC BLOOD PRESSURE: 72 MMHG | SYSTOLIC BLOOD PRESSURE: 108 MMHG

## 2023-08-24 DIAGNOSIS — O09.93 ENCOUNTER FOR SUPERVISION OF HIGH RISK PREGNANCY IN THIRD TRIMESTER, ANTEPARTUM: ICD-10-CM

## 2023-08-24 DIAGNOSIS — F10.931 ALCOHOL WITHDRAWAL DELIRIUM, ACUTE, HYPERACTIVE (HCC): ICD-10-CM

## 2023-08-24 DIAGNOSIS — Z34.82 ENCOUNTER FOR SUPERVISION OF OTHER NORMAL PREGNANCY IN SECOND TRIMESTER: ICD-10-CM

## 2023-08-24 DIAGNOSIS — F12.90 CANNABIS USE DISORDER: ICD-10-CM

## 2023-08-24 PROCEDURE — 3078F DIAST BP <80 MM HG: CPT | Performed by: ADVANCED PRACTICE MIDWIFE

## 2023-08-24 PROCEDURE — 0502F SUBSEQUENT PRENATAL CARE: CPT | Performed by: ADVANCED PRACTICE MIDWIFE

## 2023-08-24 PROCEDURE — 3074F SYST BP LT 130 MM HG: CPT | Performed by: ADVANCED PRACTICE MIDWIFE

## 2023-08-24 ASSESSMENT — FIBROSIS 4 INDEX: FIB4 SCORE: 0.41

## 2023-08-24 NOTE — PROGRESS NOTES
Pt here today for OB follow up  Pt states her nausea has come back. Would like to discuss meds.  Reports +FM  Good # [phone]  Pharmacy Confirmed.  Chaperone offered and mot indicated.   Pt would like BTL, consent signed   Pt would like TDAP, consent signed

## 2023-08-28 NOTE — PROGRESS NOTES
Patient here for ELE visit at 27w3d of pregnancy. She affirms fetal movement, denies vaginal bleeding or cramping/regular contractions. She reports overall today she is feeling well and without complaints. No recent ER visits since last seen. Recently saw BRENNAN Jay and is continuing Celexa and Lamictal. 3rd trimester labs provided to patient.  Adequate hydration reviewed in detail with patient. Precautions and warning signs reviewed with patient.  RTC in 2-3 week(s) for ELE visit.     [facility] referral recommendations  Echocardiogram- order placed and patient to scheduled  Serial growth ultrasounds- order placed and patient to schedule  CMP, GGT, PT/INR, platelets added to her 3rd trimester labs (GGT and PT/INR added after visit)      Tdap- today  Needs postpartum colposcopy for ASC-H. Colpo a few weeks ago showed LSIL.

## 2023-08-29 PROCEDURE — 90715 TDAP VACCINE 7 YRS/> IM: CPT | Performed by: ADVANCED PRACTICE MIDWIFE

## 2023-08-29 PROCEDURE — 90471 IMMUNIZATION ADMIN: CPT | Performed by: ADVANCED PRACTICE MIDWIFE

## 2023-08-30 ENCOUNTER — HOSPITAL ENCOUNTER (OUTPATIENT)
Dept: LAB | Facility: MEDICAL CENTER | Age: 23
End: 2023-08-30
Attending: ADVANCED PRACTICE MIDWIFE
Payer: COMMERCIAL

## 2023-08-30 DIAGNOSIS — O09.93 ENCOUNTER FOR SUPERVISION OF HIGH RISK PREGNANCY IN THIRD TRIMESTER, ANTEPARTUM: ICD-10-CM

## 2023-08-30 DIAGNOSIS — Z34.82 ENCOUNTER FOR SUPERVISION OF OTHER NORMAL PREGNANCY IN SECOND TRIMESTER: ICD-10-CM

## 2023-08-30 DIAGNOSIS — F10.931 ALCOHOL WITHDRAWAL DELIRIUM, ACUTE, HYPERACTIVE (HCC): ICD-10-CM

## 2023-08-30 LAB
ALBUMIN SERPL BCP-MCNC: 3.9 G/DL (ref 3.2–4.9)
ALBUMIN/GLOB SERPL: 1.3 G/DL
ALP SERPL-CCNC: 68 U/L (ref 30–99)
ALT SERPL-CCNC: 7 U/L (ref 2–50)
ANION GAP SERPL CALC-SCNC: 11 MMOL/L (ref 7–16)
AST SERPL-CCNC: 11 U/L (ref 12–45)
BILIRUB SERPL-MCNC: 0.2 MG/DL (ref 0.1–1.5)
BUN SERPL-MCNC: 8 MG/DL (ref 8–22)
CALCIUM ALBUM COR SERPL-MCNC: 9.5 MG/DL (ref 8.5–10.5)
CALCIUM SERPL-MCNC: 9.4 MG/DL (ref 8.5–10.5)
CHLORIDE SERPL-SCNC: 105 MMOL/L (ref 96–112)
CO2 SERPL-SCNC: 20 MMOL/L (ref 20–33)
CREAT SERPL-MCNC: 0.51 MG/DL (ref 0.5–1.4)
ERYTHROCYTE [DISTWIDTH] IN BLOOD BY AUTOMATED COUNT: 38.9 FL (ref 35.9–50)
GFR SERPLBLD CREATININE-BSD FMLA CKD-EPI: 134 ML/MIN/1.73 M 2
GLOBULIN SER CALC-MCNC: 3 G/DL (ref 1.9–3.5)
GLUCOSE 1H P 50 G GLC PO SERPL-MCNC: 102 MG/DL (ref 70–139)
GLUCOSE SERPL-MCNC: 82 MG/DL (ref 65–99)
HCT VFR BLD AUTO: 33.4 % (ref 37–47)
HGB BLD-MCNC: 10.9 G/DL (ref 12–16)
INR PPP: 0.98 (ref 0.87–1.13)
MCH RBC QN AUTO: 28.5 PG (ref 27–33)
MCHC RBC AUTO-ENTMCNC: 32.6 G/DL (ref 32.2–35.5)
MCV RBC AUTO: 87.4 FL (ref 81.4–97.8)
PLATELET # BLD AUTO: 284 K/UL (ref 164–446)
PMV BLD AUTO: 9.3 FL (ref 9–12.9)
POTASSIUM SERPL-SCNC: 4.1 MMOL/L (ref 3.6–5.5)
PROT SERPL-MCNC: 6.9 G/DL (ref 6–8.2)
PROTHROMBIN TIME: 13.1 SEC (ref 12–14.6)
RBC # BLD AUTO: 3.82 M/UL (ref 4.2–5.4)
SODIUM SERPL-SCNC: 136 MMOL/L (ref 135–145)
WBC # BLD AUTO: 7.8 K/UL (ref 4.8–10.8)

## 2023-08-30 PROCEDURE — 85027 COMPLETE CBC AUTOMATED: CPT

## 2023-08-30 PROCEDURE — 80053 COMPREHEN METABOLIC PANEL: CPT

## 2023-08-30 PROCEDURE — 82977 ASSAY OF GGT: CPT

## 2023-08-30 PROCEDURE — 36415 COLL VENOUS BLD VENIPUNCTURE: CPT

## 2023-08-30 PROCEDURE — 86780 TREPONEMA PALLIDUM: CPT

## 2023-08-30 PROCEDURE — 82950 GLUCOSE TEST: CPT

## 2023-08-30 PROCEDURE — 85610 PROTHROMBIN TIME: CPT

## 2023-08-31 LAB
GGT SERPL-CCNC: 5 U/L (ref 7–34)
T PALLIDUM AB SER QL IA: NORMAL

## 2023-09-07 ENCOUNTER — ROUTINE PRENATAL (OUTPATIENT)
Dept: OBGYN | Facility: CLINIC | Age: 23
End: 2023-09-07
Payer: COMMERCIAL

## 2023-09-07 ENCOUNTER — APPOINTMENT (OUTPATIENT)
Dept: RADIOLOGY | Facility: IMAGING CENTER | Age: 23
End: 2023-09-07
Attending: ADVANCED PRACTICE MIDWIFE
Payer: COMMERCIAL

## 2023-09-07 VITALS — SYSTOLIC BLOOD PRESSURE: 110 MMHG | DIASTOLIC BLOOD PRESSURE: 68 MMHG | WEIGHT: 210 LBS | BODY MASS INDEX: 31.93 KG/M2

## 2023-09-07 DIAGNOSIS — F10.931 ALCOHOL WITHDRAWAL DELIRIUM, ACUTE, HYPERACTIVE (HCC): ICD-10-CM

## 2023-09-07 DIAGNOSIS — O09.93 ENCOUNTER FOR SUPERVISION OF HIGH RISK PREGNANCY IN THIRD TRIMESTER, ANTEPARTUM: Primary | ICD-10-CM

## 2023-09-07 DIAGNOSIS — F12.90 CANNABIS USE DISORDER: ICD-10-CM

## 2023-09-07 PROCEDURE — 3078F DIAST BP <80 MM HG: CPT | Performed by: NURSE PRACTITIONER

## 2023-09-07 PROCEDURE — 76816 OB US FOLLOW-UP PER FETUS: CPT | Mod: TC | Performed by: ADVANCED PRACTICE MIDWIFE

## 2023-09-07 PROCEDURE — 3074F SYST BP LT 130 MM HG: CPT | Performed by: NURSE PRACTITIONER

## 2023-09-07 PROCEDURE — 0502F SUBSEQUENT PRENATAL CARE: CPT | Performed by: NURSE PRACTITIONER

## 2023-09-07 RX ORDER — MAGNESIUM OXIDE 400 MG/1
200 TABLET ORAL DAILY
COMMUNITY

## 2023-09-07 ASSESSMENT — FIBROSIS 4 INDEX: FIB4 SCORE: 0.35

## 2023-09-07 NOTE — PROGRESS NOTES
OB follow up   + fetal movement.  No VB, LOF or UC's.  Phone # [phone]  Preferred pharmacy confirmed.  Had US for growth today  Kick count sheet given today.  BTL

## 2023-09-07 NOTE — LETTER
"Count Your Baby's Movements  Another step to a healthy delivery    Jessie Carney             Dept: [phone]    How Many Weeks Pregnant= 29w0d    Date to Begin Countin23              How to use this chart    One way for your physician to keep track of your baby's health is by knowing how often the baby moves (or \"kicks\") in your womb.  You can help your physician to do this by using this chart every day.    Every day, you should see how many hours it takes for your baby to move 10 times.  Start in the morning, as soon as you get up.    First, write down the time your baby moves until you get to 10.  Check off one box every time your baby moves until you get to 10.  Write down the time you finished counting in the last column.  Total how long it took to count up all 10 movements.  Finally, fill in the box that shows how long this took.  After counting 10 movements, you no longer have to count any more that day.  The next morning, just start counting again as soon as you get up.    What should you call a \"movement\"?  It is hard to say, because it will feel different from one mother to another and from one pregnancy to the next.  The important thing is that you count the movements the same way throughout your pregnancy.  If you have more questions, you should ask your physician.    Count carefully every day!  SAMPLE:  Week 28    How many hours did it take to feel 10 movements?       Start  Time     1     2     3     4     5     6     7     8     9     10   Finish Time   Mon 8:20           11:40                  Fri               Sat               Sun                 IMPORTANT: You should contact your physician if it takes more than two hours for you to feel 10 movements.  Each morning, write down the time and start to count the movements of your baby.  Keep track by checking off one box every time you feel one movement.  When you have felt 10 \"kicks\", write down the time " you finished counting in the last column.  Then fill in the   box (over the check erick) for the number of hours it took.  Be sure to read the complete instructions on the previous page.

## 2023-09-20 ENCOUNTER — TELEPHONE (OUTPATIENT)
Dept: OBGYN | Facility: CLINIC | Age: 23
End: 2023-09-20

## 2023-09-20 NOTE — TELEPHONE ENCOUNTER
VOICEMAIL  1. Caller Name: Jessie Orosco                      Call Back Number: ***    2. Message: ***    3. Patient approves office to leave a detailed voicemail/MyChart message: {YES/NO:63}

## 2023-09-22 ENCOUNTER — HOSPITAL ENCOUNTER (OUTPATIENT)
Facility: MEDICAL CENTER | Age: 23
End: 2023-09-22
Attending: NURSE PRACTITIONER
Payer: COMMERCIAL

## 2023-09-22 ENCOUNTER — ROUTINE PRENATAL (OUTPATIENT)
Dept: OBGYN | Facility: CLINIC | Age: 23
End: 2023-09-22
Payer: COMMERCIAL

## 2023-09-22 VITALS — WEIGHT: 217 LBS | SYSTOLIC BLOOD PRESSURE: 100 MMHG | BODY MASS INDEX: 32.99 KG/M2 | DIASTOLIC BLOOD PRESSURE: 52 MMHG

## 2023-09-22 DIAGNOSIS — O09.93 ENCOUNTER FOR SUPERVISION OF HIGH RISK PREGNANCY IN THIRD TRIMESTER, ANTEPARTUM: Primary | ICD-10-CM

## 2023-09-22 DIAGNOSIS — O09.93 ENCOUNTER FOR SUPERVISION OF HIGH RISK PREGNANCY IN THIRD TRIMESTER, ANTEPARTUM: ICD-10-CM

## 2023-09-22 LAB — FIBRONECTIN FETAL SPEC QL: NEGATIVE

## 2023-09-22 PROCEDURE — 82731 ASSAY OF FETAL FIBRONECTIN: CPT

## 2023-09-22 PROCEDURE — 3078F DIAST BP <80 MM HG: CPT | Performed by: NURSE PRACTITIONER

## 2023-09-22 PROCEDURE — 3074F SYST BP LT 130 MM HG: CPT | Performed by: NURSE PRACTITIONER

## 2023-09-22 PROCEDURE — 0502F SUBSEQUENT PRENATAL CARE: CPT | Performed by: NURSE PRACTITIONER

## 2023-09-22 ASSESSMENT — FIBROSIS 4 INDEX: FIB4 SCORE: 0.35

## 2023-09-22 NOTE — PROGRESS NOTES
S:  Pt is  at 31w1d for routine OB follow up.  Reports CTS q 6-7 per hr, began on  () and has continues all week. No ED or hospital visits since last seen. Reports good FM.  Denies VB, LOF,  or vaginal DC.    O:  Please see above vitals.        VE: FT outer os/ closed inner os/ 50%/posterior soft            A:  IUP at 31w1d  Patient Active Problem List    Diagnosis Date Noted    HGSIL (high grade squamous intraepithelial lesion) on Pap smear of cervix 2023    Rubella non-immune status, antepartum 2023    Encounter for supervision of high risk pregnancy in third trimester, antepartum 2023    Suicide attempt (HCC) 2023    Cannabis use disorder 02/10/2023    Alcohol withdrawal delirium, acute, hyperactive (HCC) 2023    Closed avulsion fracture of metatarsal bone of right foot 2023    History of anxiety and depression 2019        P:  1.  GBS @ 36 wks.          2.  Continue FKCs.          3.  Questions answered.          4.  Encouraged pt to tour L&D.          5.  Encourage adequate water intake.        6.  Discussed childbirth education, discussed carseat safety, WIC information given        7.  F/u 2 wks. Growth US in 4 wks        8.  Based on exam today, FFN sent        9.  PTL precautions reviewed

## 2023-09-22 NOTE — PROGRESS NOTES
OB follow up   + fetal movement.  No VB, LOF   C/o UC's up 6-7 per hour.  Phone # [phone]  Preferred pharmacy confirmed.  @11:36 am. Called [facility] lab for stat p/u. # [id number]

## 2023-10-05 ENCOUNTER — ROUTINE PRENATAL (OUTPATIENT)
Dept: OBGYN | Facility: CLINIC | Age: 23
End: 2023-10-05
Payer: COMMERCIAL

## 2023-10-05 ENCOUNTER — HOSPITAL ENCOUNTER (OUTPATIENT)
Facility: MEDICAL CENTER | Age: 23
End: 2023-10-05
Attending: PHYSICIAN ASSISTANT
Payer: COMMERCIAL

## 2023-10-05 VITALS — WEIGHT: 218 LBS | DIASTOLIC BLOOD PRESSURE: 60 MMHG | BODY MASS INDEX: 33.15 KG/M2 | SYSTOLIC BLOOD PRESSURE: 90 MMHG

## 2023-10-05 DIAGNOSIS — O09.93 ENCOUNTER FOR SUPERVISION OF HIGH RISK PREGNANCY IN THIRD TRIMESTER, ANTEPARTUM: ICD-10-CM

## 2023-10-05 DIAGNOSIS — Z34.90 PREGNANCY, UNSPECIFIED GESTATIONAL AGE: ICD-10-CM

## 2023-10-05 LAB — FIBRONECTIN FETAL SPEC QL: NEGATIVE

## 2023-10-05 PROCEDURE — 3078F DIAST BP <80 MM HG: CPT | Performed by: PHYSICIAN ASSISTANT

## 2023-10-05 PROCEDURE — 0502F SUBSEQUENT PRENATAL CARE: CPT | Performed by: PHYSICIAN ASSISTANT

## 2023-10-05 PROCEDURE — 3074F SYST BP LT 130 MM HG: CPT | Performed by: PHYSICIAN ASSISTANT

## 2023-10-05 PROCEDURE — 82731 ASSAY OF FETAL FIBRONECTIN: CPT

## 2023-10-05 RX ORDER — BREAST PUMP
EACH MISCELLANEOUS
Qty: 1 EACH | Refills: 0 | Status: SHIPPED
Start: 2023-10-05

## 2023-10-05 ASSESSMENT — FIBROSIS 4 INDEX: FIB4 SCORE: 0.35

## 2023-10-05 NOTE — PROGRESS NOTES
Pt has no complaints with cramping, current UCs, VB, LOF though pt has had strong UCs starting all night 10/2 then every night after, though they stop in the morning. +FM. FFN collected today. Cervix: Ft/th/-3, ant, soft, vtx. PTL precautions stressed today. Daily FKC recommended. GBS at 36wk. RTC 2 wk or sooner prn.

## 2023-10-05 NOTE — PROGRESS NOTES
OB follow up   + fetal movement.  No VB, LOF or UC's.  Phone # [phone] (home)   Preferred pharmacy confirmed.  Flu vaccine offered and declined  Us 10/20  Pt states  light spotting a few days ago. Pt is requesting getting her cervix checked due to dilatation.

## 2023-10-17 ENCOUNTER — APPOINTMENT (OUTPATIENT)
Dept: RADIOLOGY | Facility: IMAGING CENTER | Age: 23
End: 2023-10-17
Attending: NURSE PRACTITIONER
Payer: COMMERCIAL

## 2023-10-17 DIAGNOSIS — O09.93 ENCOUNTER FOR SUPERVISION OF HIGH RISK PREGNANCY IN THIRD TRIMESTER, ANTEPARTUM: ICD-10-CM

## 2023-10-17 PROCEDURE — 76816 OB US FOLLOW-UP PER FETUS: CPT | Mod: TC | Performed by: NURSE PRACTITIONER

## 2023-10-23 ENCOUNTER — HOSPITAL ENCOUNTER (EMERGENCY)
Facility: MEDICAL CENTER | Age: 23
End: 2023-10-23
Attending: OBSTETRICS & GYNECOLOGY | Admitting: OBSTETRICS & GYNECOLOGY
Payer: COMMERCIAL

## 2023-10-23 VITALS
HEIGHT: 68 IN | RESPIRATION RATE: 16 BRPM | WEIGHT: 218 LBS | HEART RATE: 95 BPM | SYSTOLIC BLOOD PRESSURE: 112 MMHG | BODY MASS INDEX: 33.04 KG/M2 | TEMPERATURE: 98.9 F | OXYGEN SATURATION: 97 % | DIASTOLIC BLOOD PRESSURE: 72 MMHG

## 2023-10-23 LAB
APPEARANCE UR: CLEAR
COLOR UR AUTO: ABNORMAL
CRYSTALS AMN MICRO: NORMAL
GLUCOSE UR QL STRIP.AUTO: NEGATIVE MG/DL
KETONES UR QL STRIP.AUTO: 40 MG/DL
LEUKOCYTE ESTERASE UR QL STRIP.AUTO: NEGATIVE
NITRITE UR QL STRIP.AUTO: NEGATIVE
PH UR STRIP.AUTO: 6 [PH] (ref 5–8)
PROT UR QL STRIP: 30 MG/DL
RBC UR QL AUTO: NEGATIVE
SP GR UR STRIP.AUTO: 1.02 (ref 1–1.03)

## 2023-10-23 PROCEDURE — 59025 FETAL NON-STRESS TEST: CPT

## 2023-10-23 PROCEDURE — 81002 URINALYSIS NONAUTO W/O SCOPE: CPT

## 2023-10-23 PROCEDURE — 99283 EMERGENCY DEPT VISIT LOW MDM: CPT

## 2023-10-23 PROCEDURE — 89060 EXAM SYNOVIAL FLUID CRYSTALS: CPT

## 2023-10-23 ASSESSMENT — FIBROSIS 4 INDEX: FIB4 SCORE: 0.35

## 2023-10-24 NOTE — ED PROVIDER NOTES
OB ED EVALUATION NOTE    SUBJECTIVE:  Jessie Carney is a 23 y.o.,  at 35w4d who presents for leaking watery mucous today and some cramping/ contractions that are starting and stopping. She denies vaginal bleeding or regular contractions. She states the baby is moving.     I personally reviewed the past medical and surgical histories, as well as the problem list.    Patient Active Problem List   Diagnosis    History of anxiety and depression    Alcohol withdrawal delirium, acute, hyperactive (HCC)    Closed avulsion fracture of metatarsal bone of right foot    Cannabis use disorder    Suicide attempt ([facility])    Encounter for supervision of high risk pregnancy in third trimester, antepartum    HGSIL (high grade squamous intraepithelial lesion) on Pap smear of cervix    Rubella non-immune status, antepartum         OBJECTIVE:  Vital Signs:   Vitals:    10/23/23 1918   BP: 112/72   Pulse: 95   Resp: 16   Temp: 37.2 °C (98.9 °F)   SpO2: 97%     GEN: NAD  Abd: soft, NT, gravid  Ext: no edema    Pelvic:   SVE: 1-2/50/-2 per RN; no change on recheck after 1hr 20min    NST read: Reactive Cat I tracing   [place]: irregular contractions v. Irritability     LABS / IMAGING:  Results for orders placed or performed during the hospital encounter of 10/23/23   POCT urinalysis device results   Result Value Ref Range    POC Color Karla     POC Appearance Clear     POC Glucose Negative Negative mg/dL    POC Ketones 40 (A) Negative mg/dL    POC Specific Gravity 1.025 1.005 - 1.030    POC Blood Negative Negative    POC Urine PH 6.0 5.0 - 8.0    POC Protein 30 (A) Negative mg/dL    POC Nitrites Negative Negative    POC Leukocyte Esterase Negative Negative         ASSESSMENT AND PLAN:  23 y.o.  at 35w4d   Dehydration: oral hydration  Fern negative  No cervical change and pt comfortable to d/c home; reports contractions have spaced out with oral hydration     The patient was instructed to follow up in the office. She was  counseled to call or return for vaginal bleeding, regular contractions, leakage of fluid or decreased fetal movement.    RENE Vincent.

## 2023-10-24 NOTE — PROGRESS NOTES
23 y.o.  EDC  (35.4 wks)     Pt presents to L&D c/o watery discharge starting at around 1100. Pt also reports Ucs that started last night. Pt states that Ucs have been intermittent throughout the day and occasionally she has strong painful Ucs. Denies VB. Reports +FM. SSE performed. Lear mucous discharge seen, but no evidence of fluid noted, fern and FFN collected. SVE 1-2/50/-2.     : Zuleyma CANALES notified, orders received for PO hydration and if Uc's don't subside, to recheck cervix in 1 hr.    : Fern result negative. Zuleyma CANALES notified.    : SVE unchanged. Zuleyma CANALES notified, will come to bedside to see pt.    : Zuleyma CANALES at bedside discussing current status and discharge home.     : Patient discharged home with specific instruction to return to L&D/Physician ie.. Bleeding/ROM/decreased FM/labor/concerns for self or baby.  Patient denies questions or concerns regarding POC since arrival and POC to discharge home.  Patient ambulated out of hospital.       FYI

## 2023-10-26 ENCOUNTER — ROUTINE PRENATAL (OUTPATIENT)
Dept: OBGYN | Facility: CLINIC | Age: 23
End: 2023-10-26
Payer: COMMERCIAL

## 2023-10-26 ENCOUNTER — HOSPITAL ENCOUNTER (OUTPATIENT)
Facility: MEDICAL CENTER | Age: 23
End: 2023-10-26
Attending: ADVANCED PRACTICE MIDWIFE
Payer: COMMERCIAL

## 2023-10-26 VITALS — WEIGHT: 227 LBS | BODY MASS INDEX: 34.52 KG/M2

## 2023-10-26 DIAGNOSIS — Z34.83 ENCOUNTER FOR SUPERVISION OF OTHER NORMAL PREGNANCY, THIRD TRIMESTER: ICD-10-CM

## 2023-10-26 PROCEDURE — 0502F SUBSEQUENT PRENATAL CARE: CPT | Performed by: ADVANCED PRACTICE MIDWIFE

## 2023-10-26 PROCEDURE — 87081 CULTURE SCREEN ONLY: CPT

## 2023-10-26 PROCEDURE — 87150 DNA/RNA AMPLIFIED PROBE: CPT

## 2023-10-26 ASSESSMENT — FIBROSIS 4 INDEX: FIB4 SCORE: 0.35

## 2023-10-26 NOTE — PROGRESS NOTES
Pt here today for OB follow up  Pt states no complaints   Reports +FM  Good # [phone]  Pharmacy Confirmed.  Chaperone offered and not indicated.   GBS today.

## 2023-10-26 NOTE — PROGRESS NOTES
S:  Pt is  at 36w0d here for routine OB follow up.  No concerns today.   DEBBIE on 10/23 for contractions.  FFN and Fern negative and SVE -/-2.  Reports good FM.  Denies VB, LOF, RUCs, or vaginal DC.     O:  See above vitals        Fetal position: vertex        VE: 1-50/-3, soft, mid        GBS collected today                     A:  IUP at 36w0d  Patient Active Problem List    Diagnosis Date Noted    HGSIL (high grade squamous intraepithelial lesion) on Pap smear of cervix 2023    Rubella non-immune status, antepartum 2023    Encounter for supervision of high risk pregnancy in third trimester, antepartum 2023    Suicide attempt (HCC) 2023    Cannabis use disorder 02/10/2023    Alcohol withdrawal delirium, acute, hyperactive (HCC) 2023    Closed avulsion fracture of metatarsal bone of right foot 2023    History of anxiety and depression 2019       P:  1.  Continue FKCs.         2.  Labor precautions given.  Instructions given on where to go.  Pt receptive to education.         3.  Questions answered.         4.  Encouraged adequate water intake, walking 30-60 minutes daily, bounce ball, pelvic rocking       5.  GBS today       6.  F/u 1wk       7.  IOL referral sent for 39 week elective IOL

## 2023-10-27 LAB — GP B STREP DNA SPEC QL NAA+PROBE: NEGATIVE

## 2023-11-02 ENCOUNTER — ROUTINE PRENATAL (OUTPATIENT)
Dept: OBGYN | Facility: CLINIC | Age: 23
End: 2023-11-02
Payer: COMMERCIAL

## 2023-11-02 VITALS — DIASTOLIC BLOOD PRESSURE: 60 MMHG | SYSTOLIC BLOOD PRESSURE: 104 MMHG | WEIGHT: 230 LBS | BODY MASS INDEX: 34.97 KG/M2

## 2023-11-02 DIAGNOSIS — O09.93 ENCOUNTER FOR SUPERVISION OF HIGH RISK PREGNANCY IN THIRD TRIMESTER, ANTEPARTUM: ICD-10-CM

## 2023-11-02 LAB — CRYSTALS AMN MICRO: NORMAL

## 2023-11-02 ASSESSMENT — FIBROSIS 4 INDEX: FIB4 SCORE: 0.35

## 2023-11-02 NOTE — PROGRESS NOTES
Pt has no complaints with cramping, current UCs, VB though pt had gush of fluid last night with small trickle since. +FM. Pt has had strong UCs over past week. GBS neg - pt notified. Pt also has had mild pain on umbilicus, likely small hernia, though no evidence of bowel incarceration or current pain. IOL 11/16 - pt aware. Labor precautions reviewed. Daily FKC recommended.     SSE: Neg valsalva, neg pooling, neg Fern  Cervix: 1-2/50/-2, ant, med, vtx    RTC 1 wk or sooner prn.

## 2023-11-02 NOTE — PROGRESS NOTES
Pt here today for OB follow up  Pt states she experiencing a gush of fluid yesterday around 6pm. Would like to be checked. In addition she has noticed a small bump near her belly button that is somewhat painful.  Reports +  Good # [phone]  Pharmacy Confirmed.  Chaperone offered and not indicated.  GBS negative    Pt is scheduled for an In-Patient Gel on 11/16/23 at 09:00 pm. Pt aware of date and time.

## 2023-11-05 ENCOUNTER — HOSPITAL ENCOUNTER (EMERGENCY)
Facility: MEDICAL CENTER | Age: 23
End: 2023-11-05
Attending: OBSTETRICS & GYNECOLOGY | Admitting: OBSTETRICS & GYNECOLOGY
Payer: COMMERCIAL

## 2023-11-05 VITALS
DIASTOLIC BLOOD PRESSURE: 74 MMHG | WEIGHT: 230 LBS | OXYGEN SATURATION: 98 % | SYSTOLIC BLOOD PRESSURE: 119 MMHG | TEMPERATURE: 98 F | HEIGHT: 68 IN | BODY MASS INDEX: 34.86 KG/M2 | HEART RATE: 100 BPM | RESPIRATION RATE: 18 BRPM

## 2023-11-05 PROCEDURE — 83789 MASS SPECTROMETRY QUAL/QUAN: CPT

## 2023-11-05 PROCEDURE — 302449 STATCHG TRIAGE ONLY (STATISTIC)

## 2023-11-05 PROCEDURE — 36415 COLL VENOUS BLD VENIPUNCTURE: CPT

## 2023-11-05 PROCEDURE — 59025 FETAL NON-STRESS TEST: CPT

## 2023-11-05 ASSESSMENT — PAIN SCALES - GENERAL: PAINLEVEL: 0 - NO PAIN

## 2023-11-05 ASSESSMENT — FIBROSIS 4 INDEX: FIB4 SCORE: 0.35

## 2023-11-06 NOTE — PROGRESS NOTES
EDC    2023  EGA   37w3d    1756: TOCO/US applied, pt educated. Pt presents with c/o decreased fetal movement throughout the day. Pt states she has been feeling her baby move, but not nearly as much as normally. Pt declines LOF, VB, and UC's. POC discussed, all questions and concerns addressed.     1830: Pt states she has been feeling lots of fetal movements since coming to triage, has felt more than 10 kicks since being here, pt bring up another concern of itchy hands and feet for the past week and has only gotten worse, request if we could run bile acid labs.     1842: Dr. Rodirguez updated with patient's status, orders received to draw bile acid labs, and discharge patient home.     1852: Discharge instructions gone over with patient, all questions and concerns addressed. Pt to discharge after lab draw.

## 2023-11-09 ENCOUNTER — ROUTINE PRENATAL (OUTPATIENT)
Dept: OBGYN | Facility: CLINIC | Age: 23
End: 2023-11-09
Payer: COMMERCIAL

## 2023-11-09 ENCOUNTER — APPOINTMENT (OUTPATIENT)
Dept: OBGYN | Facility: MEDICAL CENTER | Age: 23
End: 2023-11-09
Attending: OBSTETRICS & GYNECOLOGY
Payer: COMMERCIAL

## 2023-11-09 ENCOUNTER — HOSPITAL ENCOUNTER (INPATIENT)
Facility: MEDICAL CENTER | Age: 23
LOS: 2 days | End: 2023-11-11
Attending: OBSTETRICS & GYNECOLOGY | Admitting: OBSTETRICS & GYNECOLOGY
Payer: COMMERCIAL

## 2023-11-09 VITALS — WEIGHT: 232 LBS | SYSTOLIC BLOOD PRESSURE: 100 MMHG | DIASTOLIC BLOOD PRESSURE: 66 MMHG | BODY MASS INDEX: 35.28 KG/M2

## 2023-11-09 DIAGNOSIS — K83.1 CHOLESTASIS DURING PREGNANCY IN THIRD TRIMESTER: ICD-10-CM

## 2023-11-09 DIAGNOSIS — Z34.90 ENCOUNTER FOR INDUCTION OF LABOR: ICD-10-CM

## 2023-11-09 DIAGNOSIS — O26.613 CHOLESTASIS DURING PREGNANCY IN THIRD TRIMESTER: ICD-10-CM

## 2023-11-09 LAB
ABO GROUP BLD: NORMAL
AMPHET UR QL SCN: NEGATIVE
BARBITURATES UR QL SCN: NEGATIVE
BASOPHILS # BLD AUTO: 0.6 % (ref 0–1.8)
BASOPHILS # BLD: 0.07 K/UL (ref 0–0.12)
BENZODIAZ UR QL SCN: NEGATIVE
BILE AC SERPL-SCNC: 7.8 UMOL/L (ref 0–7)
BLD GP AB SCN SERPL QL: NORMAL
BZE UR QL SCN: NEGATIVE
CANNABINOIDS UR QL SCN: NEGATIVE
CDCAE SERPL-SCNC: 2.6 UMOL/L (ref 0–3.4)
CHOLATE SERPL-SCNC: 1.5 UMOL/L (ref 0–1.9)
DO-CHOLATE SERPL-SCNC: 3.4 UMOL/L (ref 0–2.5)
EOSINOPHIL # BLD AUTO: 0.11 K/UL (ref 0–0.51)
EOSINOPHIL NFR BLD: 1 % (ref 0–6.9)
ERYTHROCYTE [DISTWIDTH] IN BLOOD BY AUTOMATED COUNT: 38.7 FL (ref 35.9–50)
FENTANYL UR QL: NEGATIVE
HCT VFR BLD AUTO: 31.3 % (ref 37–47)
HGB BLD-MCNC: 9.9 G/DL (ref 12–16)
HOLDING TUBE BB 8507: NORMAL
IMM GRANULOCYTES # BLD AUTO: 0.32 K/UL (ref 0–0.11)
IMM GRANULOCYTES NFR BLD AUTO: 2.8 % (ref 0–0.9)
LYMPHOCYTES # BLD AUTO: 2.33 K/UL (ref 1–4.8)
LYMPHOCYTES NFR BLD: 20.3 % (ref 22–41)
MCH RBC QN AUTO: 24.8 PG (ref 27–33)
MCHC RBC AUTO-ENTMCNC: 31.6 G/DL (ref 32.2–35.5)
MCV RBC AUTO: 78.4 FL (ref 81.4–97.8)
METHADONE UR QL SCN: NEGATIVE
MONOCYTES # BLD AUTO: 0.82 K/UL (ref 0–0.85)
MONOCYTES NFR BLD AUTO: 7.1 % (ref 0–13.4)
NEUTROPHILS # BLD AUTO: 7.85 K/UL (ref 1.82–7.42)
NEUTROPHILS NFR BLD: 68.2 % (ref 44–72)
NRBC # BLD AUTO: 0 K/UL
NRBC BLD-RTO: 0 /100 WBC (ref 0–0.2)
OPIATES UR QL SCN: NEGATIVE
OXYCODONE UR QL SCN: NEGATIVE
PCP UR QL SCN: NEGATIVE
PLATELET # BLD AUTO: 344 K/UL (ref 164–446)
PMV BLD AUTO: 9.1 FL (ref 9–12.9)
PROPOXYPH UR QL SCN: NEGATIVE
RBC # BLD AUTO: 3.99 M/UL (ref 4.2–5.4)
RH BLD: NORMAL
T PALLIDUM AB SER QL IA: NORMAL
URSODEOXYCHOLATE SERPL-SCNC: 0.3 UMOL/L (ref 0–1)
WBC # BLD AUTO: 11.5 K/UL (ref 4.8–10.8)

## 2023-11-09 PROCEDURE — 36415 COLL VENOUS BLD VENIPUNCTURE: CPT

## 2023-11-09 PROCEDURE — 80307 DRUG TEST PRSMV CHEM ANLYZR: CPT

## 2023-11-09 PROCEDURE — 3074F SYST BP LT 130 MM HG: CPT | Performed by: ADVANCED PRACTICE MIDWIFE

## 2023-11-09 PROCEDURE — 700105 HCHG RX REV CODE 258: Performed by: NURSE PRACTITIONER

## 2023-11-09 PROCEDURE — 770002 HCHG ROOM/CARE - OB PRIVATE (112)

## 2023-11-09 PROCEDURE — 0502F SUBSEQUENT PRENATAL CARE: CPT | Performed by: ADVANCED PRACTICE MIDWIFE

## 2023-11-09 PROCEDURE — 3078F DIAST BP <80 MM HG: CPT | Performed by: ADVANCED PRACTICE MIDWIFE

## 2023-11-09 PROCEDURE — 85025 COMPLETE CBC W/AUTO DIFF WBC: CPT

## 2023-11-09 PROCEDURE — 10907ZC DRAINAGE OF AMNIOTIC FLUID, THERAPEUTIC FROM PRODUCTS OF CONCEPTION, VIA NATURAL OR ARTIFICIAL OPENING: ICD-10-PCS | Performed by: OBSTETRICS & GYNECOLOGY

## 2023-11-09 PROCEDURE — 86780 TREPONEMA PALLIDUM: CPT

## 2023-11-09 PROCEDURE — 700111 HCHG RX REV CODE 636 W/ 250 OVERRIDE (IP): Performed by: NURSE PRACTITIONER

## 2023-11-09 RX ORDER — ONDANSETRON 4 MG/1
4 TABLET, ORALLY DISINTEGRATING ORAL EVERY 6 HOURS PRN
Status: DISCONTINUED | OUTPATIENT
Start: 2023-11-09 | End: 2023-11-10 | Stop reason: HOSPADM

## 2023-11-09 RX ORDER — SODIUM CHLORIDE, SODIUM LACTATE, POTASSIUM CHLORIDE, CALCIUM CHLORIDE 600; 310; 30; 20 MG/100ML; MG/100ML; MG/100ML; MG/100ML
INJECTION, SOLUTION INTRAVENOUS CONTINUOUS
Status: DISCONTINUED | OUTPATIENT
Start: 2023-11-09 | End: 2023-11-10

## 2023-11-09 RX ORDER — TERBUTALINE SULFATE 1 MG/ML
0.25 INJECTION, SOLUTION SUBCUTANEOUS
Status: COMPLETED | OUTPATIENT
Start: 2023-11-09 | End: 2023-11-10

## 2023-11-09 RX ORDER — IBUPROFEN 800 MG/1
800 TABLET ORAL
Status: DISCONTINUED | OUTPATIENT
Start: 2023-11-09 | End: 2023-11-10 | Stop reason: HOSPADM

## 2023-11-09 RX ORDER — OXYTOCIN 10 [USP'U]/ML
10 INJECTION, SOLUTION INTRAMUSCULAR; INTRAVENOUS
Status: DISCONTINUED | OUTPATIENT
Start: 2023-11-09 | End: 2023-11-10 | Stop reason: HOSPADM

## 2023-11-09 RX ORDER — ONDANSETRON 2 MG/ML
4 INJECTION INTRAMUSCULAR; INTRAVENOUS EVERY 6 HOURS PRN
Status: DISCONTINUED | OUTPATIENT
Start: 2023-11-09 | End: 2023-11-10 | Stop reason: HOSPADM

## 2023-11-09 RX ORDER — ACETAMINOPHEN 500 MG
1000 TABLET ORAL
Status: DISCONTINUED | OUTPATIENT
Start: 2023-11-09 | End: 2023-11-10 | Stop reason: HOSPADM

## 2023-11-09 RX ORDER — MISOPROSTOL 200 UG/1
800 TABLET ORAL
Status: DISCONTINUED | OUTPATIENT
Start: 2023-11-09 | End: 2023-11-10 | Stop reason: HOSPADM

## 2023-11-09 RX ORDER — LIDOCAINE HYDROCHLORIDE 10 MG/ML
20 INJECTION, SOLUTION INFILTRATION; PERINEURAL
Status: DISCONTINUED | OUTPATIENT
Start: 2023-11-09 | End: 2023-11-10 | Stop reason: HOSPADM

## 2023-11-09 RX ORDER — ALUMINA, MAGNESIA, AND SIMETHICONE 2400; 2400; 240 MG/30ML; MG/30ML; MG/30ML
30 SUSPENSION ORAL EVERY 6 HOURS PRN
Status: DISCONTINUED | OUTPATIENT
Start: 2023-11-09 | End: 2023-11-10 | Stop reason: HOSPADM

## 2023-11-09 RX ORDER — METOCLOPRAMIDE HYDROCHLORIDE 5 MG/ML
10 INJECTION INTRAMUSCULAR; INTRAVENOUS EVERY 6 HOURS PRN
Status: DISCONTINUED | OUTPATIENT
Start: 2023-11-09 | End: 2023-11-10 | Stop reason: HOSPADM

## 2023-11-09 RX ADMIN — OXYTOCIN 2 MILLI-UNITS/MIN: 10 INJECTION, SOLUTION INTRAMUSCULAR; INTRAVENOUS at 22:25

## 2023-11-09 RX ADMIN — SODIUM CHLORIDE, POTASSIUM CHLORIDE, SODIUM LACTATE AND CALCIUM CHLORIDE: 600; 310; 30; 20 INJECTION, SOLUTION INTRAVENOUS at 22:23

## 2023-11-09 ASSESSMENT — FIBROSIS 4 INDEX
FIB4 SCORE: 0.35
FIB4 SCORE: 0.35

## 2023-11-09 ASSESSMENT — PATIENT HEALTH QUESTIONNAIRE - PHQ9
1. LITTLE INTEREST OR PLEASURE IN DOING THINGS: NOT AT ALL
SUM OF ALL RESPONSES TO PHQ9 QUESTIONS 1 AND 2: 0
2. FEELING DOWN, DEPRESSED, IRRITABLE, OR HOPELESS: NOT AT ALL

## 2023-11-09 ASSESSMENT — PAIN SCALES - GENERAL: PAINLEVEL: 0 - NO PAIN

## 2023-11-09 ASSESSMENT — PAIN DESCRIPTION - PAIN TYPE
TYPE: ACUTE PAIN

## 2023-11-09 ASSESSMENT — LIFESTYLE VARIABLES
ALCOHOL_USE: NO
EVER_SMOKED: NEVER

## 2023-11-09 NOTE — PROGRESS NOTES
Pt. Here for OB/FU.   Reports Good FM.    Pt. Denies VB, LOF, or UC's.   Chaperone offered.   Pt desires a cervical check and membrane sweep if possible.   Pt states she went out trick or treating with the kids and had to stop multiple times due to the contractions she was experiencing.

## 2023-11-10 ENCOUNTER — ANESTHESIA EVENT (OUTPATIENT)
Dept: ANESTHESIOLOGY | Facility: MEDICAL CENTER | Age: 23
End: 2023-11-10
Payer: COMMERCIAL

## 2023-11-10 ENCOUNTER — ANESTHESIA (OUTPATIENT)
Dept: ANESTHESIOLOGY | Facility: MEDICAL CENTER | Age: 23
End: 2023-11-10
Payer: COMMERCIAL

## 2023-11-10 PROCEDURE — 700105 HCHG RX REV CODE 258: Performed by: NURSE PRACTITIONER

## 2023-11-10 PROCEDURE — 700111 HCHG RX REV CODE 636 W/ 250 OVERRIDE (IP): Mod: JZ | Performed by: NURSE PRACTITIONER

## 2023-11-10 PROCEDURE — 770002 HCHG ROOM/CARE - OB PRIVATE (112)

## 2023-11-10 PROCEDURE — 59400 OBSTETRICAL CARE: CPT | Mod: GC | Performed by: OBSTETRICS & GYNECOLOGY

## 2023-11-10 PROCEDURE — 700111 HCHG RX REV CODE 636 W/ 250 OVERRIDE (IP): Mod: JZ | Performed by: INTERNAL MEDICINE

## 2023-11-10 PROCEDURE — 700102 HCHG RX REV CODE 250 W/ 637 OVERRIDE(OP)

## 2023-11-10 PROCEDURE — 303615 HCHG EPIDURAL/SPINAL ANESTHESIA FOR LABOR

## 2023-11-10 PROCEDURE — 3E0234Z INTRODUCTION OF SERUM, TOXOID AND VACCINE INTO MUSCLE, PERCUTANEOUS APPROACH: ICD-10-PCS | Performed by: OBSTETRICS & GYNECOLOGY

## 2023-11-10 PROCEDURE — A9270 NON-COVERED ITEM OR SERVICE: HCPCS

## 2023-11-10 PROCEDURE — 59409 OBSTETRICAL CARE: CPT

## 2023-11-10 PROCEDURE — 700101 HCHG RX REV CODE 250: Performed by: INTERNAL MEDICINE

## 2023-11-10 PROCEDURE — 304965 HCHG RECOVERY SERVICES

## 2023-11-10 RX ORDER — SODIUM CHLORIDE, SODIUM LACTATE, POTASSIUM CHLORIDE, AND CALCIUM CHLORIDE .6; .31; .03; .02 G/100ML; G/100ML; G/100ML; G/100ML
1000 INJECTION, SOLUTION INTRAVENOUS
Status: DISCONTINUED | OUTPATIENT
Start: 2023-11-10 | End: 2023-11-10 | Stop reason: HOSPADM

## 2023-11-10 RX ORDER — SODIUM CHLORIDE, SODIUM LACTATE, POTASSIUM CHLORIDE, AND CALCIUM CHLORIDE .6; .31; .03; .02 G/100ML; G/100ML; G/100ML; G/100ML
250 INJECTION, SOLUTION INTRAVENOUS PRN
Status: DISCONTINUED | OUTPATIENT
Start: 2023-11-10 | End: 2023-11-10 | Stop reason: HOSPADM

## 2023-11-10 RX ORDER — EPHEDRINE SULFATE 50 MG/ML
5 INJECTION, SOLUTION INTRAVENOUS
Status: DISCONTINUED | OUTPATIENT
Start: 2023-11-10 | End: 2023-11-10 | Stop reason: HOSPADM

## 2023-11-10 RX ORDER — ACETAMINOPHEN 500 MG
1000 TABLET ORAL EVERY 6 HOURS PRN
Status: DISCONTINUED | OUTPATIENT
Start: 2023-11-10 | End: 2023-11-11 | Stop reason: HOSPADM

## 2023-11-10 RX ORDER — DOCUSATE SODIUM 100 MG/1
100 CAPSULE, LIQUID FILLED ORAL 2 TIMES DAILY PRN
Status: DISCONTINUED | OUTPATIENT
Start: 2023-11-10 | End: 2023-11-11 | Stop reason: HOSPADM

## 2023-11-10 RX ORDER — ROPIVACAINE HYDROCHLORIDE 2 MG/ML
INJECTION, SOLUTION EPIDURAL; INFILTRATION; PERINEURAL CONTINUOUS
Status: DISCONTINUED | OUTPATIENT
Start: 2023-11-10 | End: 2023-11-10

## 2023-11-10 RX ORDER — IBUPROFEN 800 MG/1
800 TABLET ORAL EVERY 8 HOURS PRN
Status: DISCONTINUED | OUTPATIENT
Start: 2023-11-10 | End: 2023-11-11 | Stop reason: HOSPADM

## 2023-11-10 RX ORDER — ROPIVACAINE HYDROCHLORIDE 2 MG/ML
INJECTION, SOLUTION EPIDURAL; INFILTRATION; PERINEURAL
Status: DISCONTINUED
Start: 2023-11-10 | End: 2023-11-10

## 2023-11-10 RX ORDER — BUPIVACAINE HYDROCHLORIDE 2.5 MG/ML
INJECTION, SOLUTION EPIDURAL; INFILTRATION; INTRACAUDAL
Status: COMPLETED | OUTPATIENT
Start: 2023-11-10 | End: 2023-11-10

## 2023-11-10 RX ORDER — SODIUM CHLORIDE, SODIUM LACTATE, POTASSIUM CHLORIDE, CALCIUM CHLORIDE 600; 310; 30; 20 MG/100ML; MG/100ML; MG/100ML; MG/100ML
INJECTION, SOLUTION INTRAVENOUS PRN
Status: DISCONTINUED | OUTPATIENT
Start: 2023-11-10 | End: 2023-11-11 | Stop reason: HOSPADM

## 2023-11-10 RX ORDER — LIDOCAINE HYDROCHLORIDE AND EPINEPHRINE 15; 5 MG/ML; UG/ML
INJECTION, SOLUTION EPIDURAL
Status: COMPLETED | OUTPATIENT
Start: 2023-11-10 | End: 2023-11-10

## 2023-11-10 RX ADMIN — FENTANYL CITRATE 100 MCG: 50 INJECTION, SOLUTION INTRAMUSCULAR; INTRAVENOUS at 09:41

## 2023-11-10 RX ADMIN — OXYTOCIN 6 MILLI-UNITS/MIN: 10 INJECTION, SOLUTION INTRAMUSCULAR; INTRAVENOUS at 05:01

## 2023-11-10 RX ADMIN — ACETAMINOPHEN 1000 MG: 500 TABLET, FILM COATED ORAL at 19:03

## 2023-11-10 RX ADMIN — BUPIVACAINE HYDROCHLORIDE 4 ML: 2.5 INJECTION, SOLUTION EPIDURAL; INFILTRATION; INTRACAUDAL at 13:36

## 2023-11-10 RX ADMIN — SODIUM CHLORIDE, POTASSIUM CHLORIDE, SODIUM LACTATE AND CALCIUM CHLORIDE: 600; 310; 30; 20 INJECTION, SOLUTION INTRAVENOUS at 04:57

## 2023-11-10 RX ADMIN — ONDANSETRON 4 MG: 2 INJECTION INTRAMUSCULAR; INTRAVENOUS at 14:06

## 2023-11-10 RX ADMIN — FENTANYL CITRATE 100 MCG: 50 INJECTION, SOLUTION INTRAMUSCULAR; INTRAVENOUS at 10:44

## 2023-11-10 RX ADMIN — TERBUTALINE SULFATE 0.25 MG: 1 INJECTION SUBCUTANEOUS at 03:29

## 2023-11-10 RX ADMIN — SODIUM CHLORIDE, POTASSIUM CHLORIDE, SODIUM LACTATE AND CALCIUM CHLORIDE: 600; 310; 30; 20 INJECTION, SOLUTION INTRAVENOUS at 03:24

## 2023-11-10 RX ADMIN — FENTANYL CITRATE 100 MCG: 50 INJECTION, SOLUTION INTRAMUSCULAR; INTRAVENOUS at 13:36

## 2023-11-10 RX ADMIN — LIDOCAINE HYDROCHLORIDE,EPINEPHRINE BITARTRATE 5 ML: 15; .005 INJECTION, SOLUTION EPIDURAL; INFILTRATION; INTRACAUDAL; PERINEURAL at 13:36

## 2023-11-10 RX ADMIN — ROPIVACAINE HYDROCHLORIDE: 2 INJECTION, SOLUTION EPIDURAL; INFILTRATION at 14:16

## 2023-11-10 RX ADMIN — FENTANYL CITRATE 100 MCG: 50 INJECTION, SOLUTION INTRAMUSCULAR; INTRAVENOUS at 04:53

## 2023-11-10 RX ADMIN — FENTANYL CITRATE 100 MCG: 50 INJECTION, SOLUTION INTRAMUSCULAR; INTRAVENOUS at 07:42

## 2023-11-10 ASSESSMENT — PAIN DESCRIPTION - PAIN TYPE
TYPE: ACUTE PAIN

## 2023-11-10 ASSESSMENT — PAIN SCALES - GENERAL: PAIN_LEVEL: 1

## 2023-11-10 NOTE — PROGRESS NOTES
0700- Full report rec'd from NILES Aguilar. Pt care assumed at this time, pt educated on pain management options and active labor process at this time. No new questions or concerns from pt or FOB at this time.     0726- Midwife Odalys Kwong at bedside for  SVE. No new orders rec'd at this time.     1205- Call rec'd from  informing she will be in the OR and to call Dr. Nur of pt is ready for delivery during the time.    1245- Dr. Nur at bedside fore SVE, recommends pt gets a epidural and is concerned pt is not making change. Advise pt against receiving fentanyl and educated pt on strip tracing.    1330- Dr. Ortiz at bedside to place pt epidural.    1420- Full report given to NILES Mcgraw for lunch relief.    1450- Returned from lunch relief. Pt care resumed.    1452- Call made to  informing MD pt is complete and ready for delivery.    1455-  at bedside for delivery .    1715- Pt transferred to PPU via wheelchair with baby in arms and FOB with pt belongings following behind. Full report given to Mally CAGE, pt care relinquished at this time.

## 2023-11-10 NOTE — PROGRESS NOTES
Patient is a  with an EDC of 2023 and EGA of 38 weeks 0 days who presents to L&D for scheduled IOL for cholestasis. External monitors applied, VS obtained. Patient reports +FM, occasional UC, denies LOF and VB. Denies complications with pregnancy. IV placed, labs drawn and sent. SVE with patient permission, see flow sheet documentation for details. Patient FOB Ki at bedside. Patient declines epidural at this time for pain management during labor. Patient denies needs, concerns, and questions at this time. Call light in reach.   2206: Report to SILVANA Kwong CNM. Orders received.   0315: SVE and AROM by ELY Kwong CNM. See flowsheets for details. Fetus immediately began to have decelerations after AROM. CNM and RN continuously at bedside. See flow sheet documentation for further details.    0700: Bedside report to NILES Sanford. Plan of care discussed. Care relinquished.

## 2023-11-10 NOTE — ANESTHESIA PROCEDURE NOTES
Epidural Block    Date/Time: 11/10/2023 1:36 PM    Performed by: Yogesh Ortiz M.D.  Authorized by: Yogesh Ortiz M.D.    Patient Location:  OB  Start Time:  11/10/2023 1:36 PM  Reason for Block: labor analgesia    patient identified, IV checked, site marked, risks and benefits discussed, surgical consent, monitors and equipment checked and pre-op evaluation    Patient Position:  Sitting  Prep: ChloraPrep, patient draped and sterile technique    Monitoring:  Blood pressure, continuous pulse oximetry and heart rate  Approach:  Midline  Location:  L3-L4  Injection Technique:  ASHISH air and ASHISH saline  Skin infiltration:  Lidocaine  Strength:  1%  Dose:  3ml  Needle Type:  Tuohy  Needle Gauge:  17 G  Needle Length:  3.5 in  Loss of resistance::  5.5  Catheter Size:  19 G  Catheter at Skin Depth:  11  Test Dose Result:  Negative   Single pass, easy ASHISH with negative aspiration. Negative test dose and aspiration via catheter. Patient comfortable after bolus, no complications.

## 2023-11-10 NOTE — CARE PLAN
The patient is Stable - Low risk of patient condition declining or worsening    Shift Goals  Clinical Goals: make adequate cervical change  Patient Goals: healthy baby and mom  Family Goals: support    Progress made toward(s) clinical / shift goals:        Problem: Knowledge Deficit - L&D  Goal: Patient and family/caregivers will demonstrate understanding of plan of care, disease process/condition, diagnostic tests and medications  Outcome: Progressing   Patient and FOB will remain updated on plan of care.   Problem: Pain  Goal: Patient's pain will be alleviated or reduced to the patient’s comfort goal  Outcome: Progressing   Pharmacological and non-pharmacological pain management options discussed wit patient.

## 2023-11-10 NOTE — PROGRESS NOTES
Labor Progress Note:    S:  Pt reports she has not felt much change since last check, has not had increased pressure with contractions.     Patient Vitals for the past 4 hrs:   BP Temp Temp src Pulse Resp   11/10/23 0700 110/66 35.9 °C (96.6 °F) Temporal 98 16     Gen: AAO, NAD    SVE: 7-8/80/-2    FHT: /moderate variability/+accels/- decels  toco: q3-4min ctx    A/P: 23 y.o.  @ 38w1d by US admitted for IOL  - labor: Active labor. Continue augmentation with IV pitocin.  - FHT: cat 1  - GBS: negative  - pain: IV pain meds and/or epidural per patient preference    Jill Potts M.D.  PGY-1  [facility]

## 2023-11-10 NOTE — CARE PLAN
The patient is Stable - Low risk of patient condition declining or worsening    Shift Goals  Clinical Goals: Good strip and successful vaginal delivery.  Patient Goals: Healthy mom and baby.  Family Goals: Support.

## 2023-11-10 NOTE — PROGRESS NOTES
"PATIENT ID:  NAME:  Jessie Carney  MRN:               [id number]  YOB: 2000    Subjective:   Pt is feeling the contractions more intensely now and desires AROM.     Objective:    Vitals:    23 2108 11/10/23 0115   BP: 111/68 118/73   Pulse: (!) 116 87   Resp: 18    Temp: 36.2 °C (97.1 °F) 36.2 °C (97.1 °F)   TempSrc: Temporal Temporal   SpO2: 97%    Weight: 105 kg (232 lb)    Height: 1.727 m (5' 8\")        Cervix:  4-5cm/70%/-2, AROM @ 03:15 copious clear  [place]: Uterine Contractions Q 2-4 minutes.   FHRM: Baseline 115, moderate variability, Accels present, no decels   Pitocin: 12      Assessment:   23 y.o. female  at 38w1d.      Plan:   LR for IV hydration  AROM'd and immediate prolonged decel noted for which pt was placed on hands and knees, FSE placed, pitocin stopped, terbutaline given and resuscitative measures initiated.  Pitocin to be restarted at half the amount  after 30m-1 hr of fetal recovery   Continuous fetal heart rate and maternal monitoring  Anticipate               "

## 2023-11-10 NOTE — L&D DELIVERY NOTE
Vaginal Delivery Procedure Note:    Jessie Carney is a 23 y.o. , admitted for induction due to cholestasis.  Her labor course was uncomplicated. Augmentation with IV pitocin, received epidural. AROM @ 03:15AM with copious clear fluid.     PreDelivery Diagnosis:  1. SIUP @ 38w1d  2. Rubella non-immune    PostDelivery Diagnosis:  1. S/p   2. Rubella non-immune    Procedure in Detail:  Pt pushing dorsal lithotomy position.  Head delivered easily and restituted towards maternal SUYAPA.  Anterior shoulder followed easily with gentle downwards pressure followed by the posterior shoulder and body.  female infant delivered @ 14:59.  There was 1x nuchal cord.  Infant was placed on the maternal abdomen and was stimulated and bulb suctioned.  Cord clamping was delayed x60 seconds then the cord was clamped x2 and cut.  Infant APGARs 8 and 8 and 1 and 5 minutes, respectively.  Birth weight pending.  Cord gases were not sent.  IV pitocin bolus started.  Placenta delivered spontaneously intact at 15:03. 3 Vessel cord.  The vagina and perineum were examined revealing no lacerations.  The uterus was firm with IV pitocin and fundal massage.  Pt and infant left bonding in LDR.    EBL 150ml  Anesthesia - Epidural  Sponge and needle counts correct.  Patient tolerated procedure well.    Anticipate routine postpartum care.    Jill Potts M.D.  [facility]  PGY-1        [place]te[place] Pregnancy And Lactation Text: This medication is Pregnancy Category C and it isn't known if it is safe during pregnancy. It is unknown if it is excreted in breast milk.

## 2023-11-10 NOTE — ANESTHESIA PREPROCEDURE EVALUATION
Date: 11/10/23  Procedure: Labor Epidural       Patient requests Neuraxial Labor Analgesia.     Anesthesia: No problems with Anesthesia.  Resp: No asthma or COPD history.  Cards: No pre-eclampsia, or known cardiac abnormalities.  Heme: No known bleeding disorders, platelets reviewed.     Risks of procedure discussed including: infection, bleeding, nerve damage, and post-dural puncture headache.       Relevant Problems   No relevant active problems       Physical Exam    Airway   Mallampati: II  TM distance: >3 FB  Neck ROM: full       Cardiovascular - normal exam  Rhythm: regular  Rate: normal  (-) murmur     Dental - normal exam           Pulmonary - normal exam  Breath sounds clear to auscultation     Abdominal    Neurological - normal exam                 Anesthesia Plan    ASA 2       Plan - epidural   Neuraxial block will be labor analgesia                  Pertinent diagnostic labs and testing reviewed    Informed Consent:    Anesthetic plan and risks discussed with patient.

## 2023-11-11 ENCOUNTER — LACTATION ENCOUNTER (OUTPATIENT)
Dept: NURSERY | Facility: MEDICAL CENTER | Age: 23
End: 2023-11-11

## 2023-11-11 ENCOUNTER — PHARMACY VISIT (OUTPATIENT)
Dept: PHARMACY | Facility: MEDICAL CENTER | Age: 23
End: 2023-11-11
Payer: MEDICARE

## 2023-11-11 VITALS
HEART RATE: 78 BPM | DIASTOLIC BLOOD PRESSURE: 70 MMHG | RESPIRATION RATE: 17 BRPM | HEIGHT: 68 IN | TEMPERATURE: 98 F | OXYGEN SATURATION: 96 % | SYSTOLIC BLOOD PRESSURE: 102 MMHG | WEIGHT: 232 LBS | BODY MASS INDEX: 35.16 KG/M2

## 2023-11-11 LAB
ERYTHROCYTE [DISTWIDTH] IN BLOOD BY AUTOMATED COUNT: 38.5 FL (ref 35.9–50)
HCT VFR BLD AUTO: 29.4 % (ref 37–47)
HGB BLD-MCNC: 9.1 G/DL (ref 12–16)
MCH RBC QN AUTO: 24.6 PG (ref 27–33)
MCHC RBC AUTO-ENTMCNC: 31 G/DL (ref 32.2–35.5)
MCV RBC AUTO: 79.5 FL (ref 81.4–97.8)
PLATELET # BLD AUTO: 253 K/UL (ref 164–446)
PMV BLD AUTO: 8.9 FL (ref 9–12.9)
RBC # BLD AUTO: 3.7 M/UL (ref 4.2–5.4)
WBC # BLD AUTO: 10.2 K/UL (ref 4.8–10.8)

## 2023-11-11 PROCEDURE — 99999 PR NO CHARGE: CPT | Performed by: OBSTETRICS & GYNECOLOGY

## 2023-11-11 PROCEDURE — 700111 HCHG RX REV CODE 636 W/ 250 OVERRIDE (IP): Performed by: OBSTETRICS & GYNECOLOGY

## 2023-11-11 PROCEDURE — 90471 IMMUNIZATION ADMIN: CPT

## 2023-11-11 PROCEDURE — 90686 IIV4 VACC NO PRSV 0.5 ML IM: CPT | Performed by: OBSTETRICS & GYNECOLOGY

## 2023-11-11 PROCEDURE — A9270 NON-COVERED ITEM OR SERVICE: HCPCS

## 2023-11-11 PROCEDURE — 36415 COLL VENOUS BLD VENIPUNCTURE: CPT

## 2023-11-11 PROCEDURE — RXMED WILLOW AMBULATORY MEDICATION CHARGE: Performed by: OBSTETRICS & GYNECOLOGY

## 2023-11-11 PROCEDURE — 700102 HCHG RX REV CODE 250 W/ 637 OVERRIDE(OP)

## 2023-11-11 PROCEDURE — 85027 COMPLETE CBC AUTOMATED: CPT

## 2023-11-11 RX ORDER — DOCUSATE SODIUM 100 MG/1
100 CAPSULE, LIQUID FILLED ORAL 2 TIMES DAILY PRN
Qty: 60 CAPSULE | Refills: 1 | Status: SHIPPED | OUTPATIENT
Start: 2023-11-11

## 2023-11-11 RX ORDER — IBUPROFEN 800 MG/1
800 TABLET ORAL EVERY 8 HOURS PRN
Qty: 60 TABLET | Refills: 1 | Status: SHIPPED | OUTPATIENT
Start: 2023-11-11

## 2023-11-11 RX ORDER — FERROUS SULFATE 325(65) MG
325 TABLET ORAL DAILY
Qty: 30 TABLET | Refills: 1 | Status: SHIPPED | OUTPATIENT
Start: 2023-11-11

## 2023-11-11 RX ORDER — ASCORBIC ACID 500 MG
500 TABLET ORAL DAILY
Qty: 30 TABLET | Refills: 1 | Status: SHIPPED | OUTPATIENT
Start: 2023-11-11

## 2023-11-11 RX ADMIN — INFLUENZA A VIRUS A/VICTORIA/4897/2022 IVR-238 (H1N1) ANTIGEN (FORMALDEHYDE INACTIVATED), INFLUENZA A VIRUS A/DARWIN/9/2021 SAN-010 (H3N2) ANTIGEN (FORMALDEHYDE INACTIVATED), INFLUENZA B VIRUS B/PHUKET/3073/2013 ANTIGEN (FORMALDEHYDE INACTIVATED), AND INFLUENZA B VIRUS B/MICHIGAN/01/2021 ANTIGEN (FORMALDEHYDE INACTIVATED) 0.5 ML: 15; 15; 15; 15 INJECTION, SUSPENSION INTRAMUSCULAR at 12:03

## 2023-11-11 RX ADMIN — ACETAMINOPHEN 1000 MG: 500 TABLET, FILM COATED ORAL at 08:35

## 2023-11-11 RX ADMIN — IBUPROFEN 800 MG: 800 TABLET, FILM COATED ORAL at 02:09

## 2023-11-11 ASSESSMENT — EDINBURGH POSTNATAL DEPRESSION SCALE (EPDS)
I HAVE FELT SCARED OR PANICKY FOR NO GOOD REASON: YES, SOMETIMES
THE THOUGHT OF HARMING MYSELF HAS OCCURRED TO ME: NEVER
I HAVE BLAMED MYSELF UNNECESSARILY WHEN THINGS WENT WRONG: NOT VERY OFTEN
I HAVE BEEN ABLE TO LAUGH AND SEE THE FUNNY SIDE OF THINGS: NOT QUITE SO MUCH NOW
THINGS HAVE BEEN GETTING ON TOP OF ME: YES, SOMETIMES I HAVEN'T BEEN COPING AS WELL AS USUAL
I HAVE BEEN SO UNHAPPY THAT I HAVE BEEN CRYING: ONLY OCCASIONALLY
I HAVE LOOKED FORWARD WITH ENJOYMENT TO THINGS: RATHER LESS THAN I USED TO
I HAVE FELT SAD OR MISERABLE: NOT VERY OFTEN
I HAVE BEEN SO UNHAPPY THAT I HAVE HAD DIFFICULTY SLEEPING: NOT VERY OFTEN
I HAVE BEEN ANXIOUS OR WORRIED FOR NO GOOD REASON: YES, VERY OFTEN

## 2023-11-11 ASSESSMENT — PAIN DESCRIPTION - PAIN TYPE
TYPE: ACUTE PAIN

## 2023-11-11 NOTE — CARE PLAN
The patient is Stable - Low risk of patient condition declining or worsening    Shift Goals  Clinical Goals:  (remain clinically stable)  Patient Goals: Healthy mom and baby.  Family Goals: Support.    Progress made toward(s) clinical / shift goals:  Patient will ask for pain medication when needed.  Patient has scant to light lochia with a firm palpable uterus.  Vital signs are within defined limits.     Patient is not progressing towards the following goals:      Problem: Pain - Standard  Goal: Alleviation of pain or a reduction in pain to the patient’s comfort goal  Outcome: Not Progressing

## 2023-11-11 NOTE — DISCHARGE SUMMARY
Discharge Summary:     Date of Admission: 2023  Date of Discharge: 23      Admitting diagnosis:    1. Pregnancy @ 38w1d  2. Cholestasis of pregnancy      Discharge Diagnosis:   1. Status post vaginal, spontaneous.  2. Cholestasis of pregnancy    Past Medical History:   Diagnosis Date    Anemia     Anxiety     Asthma     Dx at age 1. not on meds    Cannabis use disorder 02/10/2023    Depression     Intractable nausea and vomiting 2023    Migraine     Seizure (HCC)     Urinary tract infection      OB History    Para Term  AB Living   5 3 3   2 3   SAB IAB Ectopic Molar Multiple Live Births   2       0 3      # Outcome Date GA Lbr Ralf/2nd Weight Sex Delivery Anes PTL Lv   5 Term 11/10/23 38w1d 11:37 / 00:07 3.1 kg (6 lb 13.4 oz) F Vag-Spont EPI N HYUN   4 Term 19 37w1d / 00:03 3.625 kg (7 lb 15.9 oz) M Vag-Spont None N HYUN   3 Term 17 40w1d  3.685 kg (8 lb 2 oz) F Vag-Spont None N HYUN      Birth Comments: IOL. pt states no complications   2 2016     SAB         Birth Comments: Passed on its own   1 2015     SAB         Birth Comments: passed on its own     Past Surgical History:   Procedure Laterality Date    ANTONIO BY LAPAROSCOPY  8/3/2018    Procedure: ANTONIO BY LAPAROSCOPY;  Surgeon: Germán Watts M.D.;  Location: SURGERY [facility];  Service: General    CYSTECTOMY  2018    face cyst removal Right side     Lanolin, Pcn [penicillins], and Sulfa drugs    Patient Active Problem List   Diagnosis    History of anxiety and depression    Alcohol withdrawal delirium, acute, hyperactive (HCC)    Closed avulsion fracture of metatarsal bone of right foot    Suicide attempt (HCC)    Encounter for supervision of high risk pregnancy in third trimester, antepartum    HGSIL (high grade squamous intraepithelial lesion) on Pap smear of cervix    Rubella non-immune status, antepartum    Encounter for induction of labor       Hospital Course:   Pt is a 23 y.o. now  who  presented for IOL for cholestasis of pregnancy. She had an uncomplicated . She was meeting all milestones and discharged on PPD#1 in stable condition. She is at high risk for post partum depression due to her history. She plans to follow up with FEM and in about 3 weeks to assess for post partum depression.     Physical Exam:  Temp:  [35.8 °C (96.5 °F)-36.6 °C (97.9 °F)] 36.3 °C (97.4 °F)  Pulse:  [] 83  Resp:  [16-18] 18  BP: ()/(60-82) 97/69  SpO2:  [93 %-99 %] 93 %  Physical Exam  General: well, resting  Abdomen: soft, non tender  Fundus: firm and below umbilicus  Incision: not applicable, (vaginal delivery)  Extremities: symmetric and no edema, calves nontender    Current Facility-Administered Medications   Medication Dose    lactated ringers infusion      docusate sodium (Colace) capsule 100 mg  100 mg    ibuprofen (Motrin) tablet 800 mg  800 mg    acetaminophen (Tylenol) tablet 1,000 mg  1,000 mg    measles, mumps and rubella vaccine (Mmr) injection 0.5 mL  0.5 mL    oxytocin (Pitocin) infusion (for post delivery)  125 mL/hr       Recent Labs     23  2122 23  0455   WBC 11.5* 10.2   RBC 3.99* 3.70*   HEMOGLOBIN 9.9* 9.1*   HEMATOCRIT 31.3* 29.4*   MCV 78.4* 79.5*   MCH 24.8* 24.6*   MCHC 31.6* 31.0*   RDW 38.7 38.5   PLATELETCT 344 253   MPV 9.1 8.9*         Activity/ Discharge Instructions::   Discharge to home  Pelvic Rest x 6 weeks  No heavy lifting x4 weeks  Call or come to ED for: heavy vaginal bleeding, fever >100.4, severe abdominal pain, severe headache, chest pain, shortness of breath,  N/V, incisional drainage, or other concerns.       Follow up:  [facility] in 5 weeks for vaginal delivery; 1 week for incision check for  delivery.     Discharge Meds:   No current outpatient medications on file.       Sophia Fish M.D.

## 2023-11-11 NOTE — PROGRESS NOTES
1715- patient assessment done.  Patient oriented to room and call light.  Condition will continue to be monitored.

## 2023-11-11 NOTE — CARE PLAN
The patient is Stable - Low risk of patient condition declining or worsening    Shift Goals  Clinical Goals: fundus and lochia WNL, VSS, pain management  Patient Goals: rest  Family Goals: support    Progress made toward(s) clinical / shift goals: Fundus firm, lochia scant. Pt's VS stable. Pt's pain managed with prn pain medications per MAR. Pt resting with support at the bedside.    Problem: Pain - Standard  Goal: Alleviation of pain or a reduction in pain to the patient’s comfort goal  Outcome: Progressing     Problem: Altered Physiologic Condition  Goal: Patient physiologically stable as evidenced by normal lochia, palpable uterine involution and vitals within normal limits  Outcome: Progressing     Patient is not progressing towards the following goals: NA

## 2023-11-11 NOTE — LACTATION NOTE
This note was copied from a baby's chart.  Mom is a 24 y/o P3 who delivered baby girl weighing 6 # 13.4 oz at 38.1 wks. Mom was breast feeding on left side when LC came to room.  Mom reports she breast fed her other children for 4 months. Mom denies any discomfort. Mom reports breast changes during pregnancy. Mom denies any breast surgeries, diabetes, thyroid or fertility issues. Mom does have a history of and/dep, SI in Jan.2023. Mom is taking Lamictal (L-2) and Celexa (L-2) during pregnancy and will continue postpartum. LC encouraged mother to discuss medication with pediatrician.   Mom reports she hears wallows while nursing and baby appears to be latched well.   LC reviewed feeding plan of demand feeds of 8 or more times in 24 hours,and to observe changes to stool color over next several days.   Mom o=has no current concerns. LC discussed avoiding use of alcohol and drugs while breast feeding.   Mom has ap ump at home and  sent referral to [facility] for mom.   Mom is preparing for discharge today.

## 2023-11-11 NOTE — ANESTHESIA POSTPROCEDURE EVALUATION
Patient: Jessie Carney    Procedure Summary       Date: 11/10/23 Room / Location:     Anesthesia Start: 1330 Anesthesia Stop: 1459    Procedure: Labor Epidural Diagnosis:     Scheduled Providers:  Responsible Provider: Yogesh Ortiz M.D.    Anesthesia Type: epidural ASA Status: 2            Final Anesthesia Type: epidural  Last vitals  BP   Blood Pressure: 102/64    Temp   35.9 °C (96.7 °F)    Pulse   79   Resp   16    SpO2   98 %      Anesthesia Post Evaluation    Patient location during evaluation: floor  Patient participation: complete - patient participated  Level of consciousness: awake and alert  Pain score: 1    Airway patency: patent  Anesthetic complications: no  Cardiovascular status: hemodynamically stable  Respiratory status: acceptable  Hydration status: euvolemic  Comments: Patient tolerated epidural labor analgesia well. Epidural will be removed per protocol. RN to call with any questions or concerns.       PONV: none          No notable events documented.     Nurse Pain Score: 1 (NPRS)

## 2023-11-11 NOTE — DISCHARGE PLANNING
Discharge Planning Assessment Post Partum    Reason for Referral: MOB hx of THC use  Address: [address] [place] [address] [place] [address]  Type of Living Situation:Stable home   Mom Diagnosis: Pregnancy  Baby Diagnosis: [place]  Primary Language: English    Name of Baby: Fco   Father of the Baby: Ki Morales : 1998  Involved in baby’s care? Yes   Contact Information: [phone]    Prenatal Care: Yes-[facility]  Mom's PCP: None   PCP for new baby:[facility]    Support System: Mom's mother  Coping/Bonding between mother & baby: Yes  Source of Feeding: Breast Feeding   Supplies for Infant: Yes     Mom's Insurance: [employer]   Baby Covered on Insurance:Yes   Mother Employed/School: No   Other children in the home/names & ages: 3rd child; 6 and 4.     Financial Hardship/Income: No   Mom's Mental status: Stable   Services used prior to admit: None     CPS History: No  Psychiatric History: Anxiety, depression, and PTSD, sees Carl Jay as her psychiatrist.   Domestic Violence History: No  Drug/ETOH History: No- pt last THC use was 2022, pt reports having reaction to using THC and has not used since reaction.     Resources Provided: PPD handout   Referrals Made: None      Clearance for Discharge: Baby is cleared to dc home once medically cleared.      Ongoing Plan:LMSW to assist with any dc needs.

## 2023-11-11 NOTE — PROGRESS NOTES
Assessment complete, pt resting comfortably in bed at this time. Fundus firm, lochia scant. Pain controlled with prn medications per MAR, pt declines at this time. Pt states voiding without difficulty. POC discussed. Call light in reach of pt, encouraged to call for assistance.

## 2023-11-11 NOTE — ANESTHESIA TIME REPORT
Anesthesia Start and Stop Event Times       Date Time Event    11/10/2023 1330 Ready for Procedure     1330 Anesthesia Start     1459 Anesthesia Stop          Responsible Staff  11/10/23      Name Role Begin End    Yogesh Ortiz M.D. Anesth 1330 1455          Overtime Reason:  no overtime (within assigned shift)    Comments:

## 2023-11-11 NOTE — PROGRESS NOTES
0830- patient assessment done.  Condition will continue to be monitored.    1530- patient states that she understands all discharge instructions and has no questions at this time.

## 2024-05-01 ENCOUNTER — APPOINTMENT (OUTPATIENT)
Dept: OBGYN | Facility: CLINIC | Age: 24
End: 2024-05-01
Payer: COMMERCIAL

## 2024-05-01 ENCOUNTER — HOSPITAL ENCOUNTER (OUTPATIENT)
Facility: MEDICAL CENTER | Age: 24
End: 2024-05-01
Attending: OBSTETRICS & GYNECOLOGY
Payer: COMMERCIAL

## 2024-05-01 VITALS
WEIGHT: 275 LBS | BODY MASS INDEX: 40.73 KG/M2 | HEIGHT: 69 IN | SYSTOLIC BLOOD PRESSURE: 110 MMHG | DIASTOLIC BLOOD PRESSURE: 68 MMHG

## 2024-05-01 DIAGNOSIS — R87.7 LOW GRADE SQUAMOUS INTRAEPITHELIAL LESION (LGSIL) ON BIOPSY OF CERVIX: Primary | ICD-10-CM

## 2024-05-01 DIAGNOSIS — R87.611 ATYPICAL SQUAMOUS CELLS CANNOT EXCLUDE HIGH GRADE SQUAMOUS INTRAEPITHELIAL LESION ON CYTOLOGIC SMEAR OF CERVIX (ASC-H): ICD-10-CM

## 2024-05-01 LAB
AMBIGUOUS DTTM AMBI4: NORMAL
PATHOLOGY CONSULT NOTE: NORMAL
POCT INT CON NEG: NEGATIVE
POCT INT CON POS: POSITIVE
POCT URINE PREGNANCY TEST: NEGATIVE

## 2024-05-01 PROCEDURE — 81025 URINE PREGNANCY TEST: CPT | Performed by: OBSTETRICS & GYNECOLOGY

## 2024-05-01 PROCEDURE — 57454 BX/CURETT OF CERVIX W/SCOPE: CPT | Performed by: OBSTETRICS & GYNECOLOGY

## 2024-05-01 RX ORDER — HYDROXYZINE HYDROCHLORIDE 25 MG/1
25 TABLET, FILM COATED ORAL 3 TIMES DAILY PRN
COMMUNITY

## 2024-05-01 ASSESSMENT — FIBROSIS 4 INDEX: FIB4 SCORE: 0.39

## 2024-05-01 NOTE — PROGRESS NOTES
"COLPOSCOPY PROCEDURE NOTE      Jessie Carney is a 23 y.o. female  who is referred for colposcopy by [facility] [place].      HISTORY  Identifies as female  Age-23 LMP-few weeks ago  Indication:LSIL on colpo  Previous abnormal screening results:ASC-H followed by colpo/bx 2023 with bx noting LSIL  Previous abnormal colpo: 2023(Mo noted at 12 with Bx at same)  Previous treatment:none  Received HPV vaccine?:unsure,discussed and handout given  Current contraception:Condoms  History of other GYN infections: Chlamydia(2016)  Tobacco use:no-prior usage,Vapes-discussed and offered counseling/Tx  Allergic to latex:no  Allergic to Iodine or Betadine:no  Allergic to local anesthesia:no  Sexually active:yes-but not for a \"few weeks\"-is breastfeeding and rare menses , in past:yes,pain:no,bleeding:no  Age at onset of activity: 13             # of lifetime partners:30  Type(s);  oral,vaginal,anal  Douche: no    If yes, most recent:  Hx of vag infections:yes  If yes, most recent?  2019     Type:  BV       Tx:yes  Current vag discharge:no  Heme disorders:no  PM HX  Med probs yes If yes, type  -Depression/Anxiety                      Is it controlled:yes  Surgery: yes  If yes, type gallbladder                          complications:no  OB:     Allergies to meds:PCN  Chronic meds:Sulfa  Family Hx  Parents:no  Siblings:no  Social Hx  ETOH(occ)/smoking(in past-vapes)/drugs(no)/safety(yes)/Hx of abuse(no)  ROS  CP, SOB, GI, -all negative      LAB  A pregnancy test-negative      PRE-PROCEDURE  The nature and meaning of PAP smears discussed: yes  HPV infection in relation to PAP smear changes discussed: yes  Cervical dysplasia and its significance and natural history discussed: yes  Colposcopy procedure explained:yes and alternates discussed as were limitations  Side effects, risks, and complications discussed (including risk of bleeding, infection, non-diagnostic colposcopy result).postprocedure care/restrictions " "discussed.all questions answered.handouts given.informed consent given by pt.      PROCEDURE  [unfilled]    Vitals:    05/01/24 1011   BP: 110/68   BP Location: Right arm   Patient Position: Sitting   BP Cuff Size: Large adult   Weight: 275 lb   Height: 5' 9\"       The vulva, vagina, and perianal area were examined with findings of grossly normal in appearance    A speculum was placed and the cervix was painted with acetic acid.  The following findings were noted:  Cervix:grossly normal in appearance  Squamocolumnar junction: completely visualized   Acetowhite changes: noted at 10-3  Lesion(s) present (acetowhite or other):no vasc changes noted    Physical Exam    LESION DESCRIPTION  As noted above-AW/WE from 10-3  Features:  No Vasc changes noted      IMPRESSION: LSIL  Anesthesia:none  Endocervical curettage performed:yes  Cervical biopsies obtained at 12,3.   Random biopsies done:none Other biopsies:none  Hemostasis:excellent following application of Monsel's Sol  Procedure performed by Dr PETER Ramos      PLAN:  Results will be communicated with the patient-pt will be seen for F/U in 2-3 weeks  Discussed the importance of appropriate follow-up:yes  Diagnosis added to problem lis, medical history, surgical history as indicated: `[id number]      Jordi Ramos M.D.    "

## 2024-05-15 ENCOUNTER — APPOINTMENT (OUTPATIENT)
Dept: OBGYN | Facility: CLINIC | Age: 24
End: 2024-05-15
Payer: COMMERCIAL

## 2024-05-22 ENCOUNTER — APPOINTMENT (OUTPATIENT)
Dept: OBGYN | Facility: CLINIC | Age: 24
End: 2024-05-22
Payer: COMMERCIAL

## 2024-06-20 ENCOUNTER — APPOINTMENT (OUTPATIENT)
Dept: OBGYN | Facility: CLINIC | Age: 24
End: 2024-06-20
Payer: COMMERCIAL

## 2024-06-20 VITALS — BODY MASS INDEX: 41.64 KG/M2 | DIASTOLIC BLOOD PRESSURE: 64 MMHG | WEIGHT: 282 LBS | SYSTOLIC BLOOD PRESSURE: 102 MMHG

## 2024-06-20 DIAGNOSIS — R87.613 HGSIL (HIGH GRADE SQUAMOUS INTRAEPITHELIAL LESION) ON PAP SMEAR OF CERVIX: ICD-10-CM

## 2024-06-20 PROCEDURE — 3078F DIAST BP <80 MM HG: CPT | Performed by: MIDWIFE

## 2024-06-20 PROCEDURE — 99213 OFFICE O/P EST LOW 20 MIN: CPT | Performed by: MIDWIFE

## 2024-06-20 PROCEDURE — 3074F SYST BP LT 130 MM HG: CPT | Performed by: MIDWIFE

## 2024-06-20 ASSESSMENT — FIBROSIS 4 INDEX: FIB4 SCORE: 0.39

## 2024-06-20 NOTE — PROGRESS NOTES
Pt here to discuss colpo results  Pt states no complaints  5/1/24 colpo LGCLAUDIA  Good # [phone] (home)    Pharmacy verified.

## 2024-06-21 PROBLEM — O09.93 ENCOUNTER FOR SUPERVISION OF HIGH RISK PREGNANCY IN THIRD TRIMESTER, ANTEPARTUM: Status: RESOLVED | Noted: 2023-07-03 | Resolved: 2024-06-21

## 2024-06-21 PROBLEM — Z28.39 RUBELLA NON-IMMUNE STATUS, ANTEPARTUM: Status: RESOLVED | Noted: 2023-07-08 | Resolved: 2024-06-21

## 2024-06-21 PROBLEM — O09.899 RUBELLA NON-IMMUNE STATUS, ANTEPARTUM: Status: RESOLVED | Noted: 2023-07-08 | Resolved: 2024-06-21

## 2024-06-21 NOTE — PROGRESS NOTES
Jessie Carney is a 23 y.o. female who presents for No chief complaint on file.          HPI Comments: Patient presents for discussion of colposcopy results.      7/3/2023 Pap ASC-H  5/1/24 Colpo MARIANNA 1/LGSIL; performed by Dr. Jordi Ramos.    Pt has not been able to schedule with Dr. Ramos due to scheduling conflict.     Review of Systems   Pertinent positives documented in HPI and all other systems reviewed and are negative.      Past Medical History:   Diagnosis Date    Anemia     Anxiety     Asthma     Dx at age 1. not on meds    Cannabis use disorder 02/10/2023    Depression     Intractable nausea and vomiting 02/09/2023    Migraine     Seizure (HCC)     Urinary tract infection        Medications:   Current Outpatient Medications   Medication Sig    hydrOXYzine HCl (ATARAX) 25 MG Tab Take 25 mg by mouth 3 times a day as needed for Itching.    ibuprofen (MOTRIN) 800 MG Tab Take 1 Tablet by mouth every 8 hours as needed for Mild Pain.    citalopram (CELEXA) 10 MG tablet Take 1 Tablet by mouth every day.    lamoTRIgine (LAMICTAL) 100 MG Tab Take 1/2 tab PO daily x 2 weeks, then increase to 1 tab PO daily.    ferrous sulfate 325 (65 Fe) MG tablet Take 1 Tablet by mouth every day. (Patient not taking: Reported on 5/1/2024)    ascorbic acid (VITAMIN C) 500 MG tablet Take 1 Tablet by mouth every day. Take with iron. (Patient not taking: Reported on 5/1/2024)    docusate sodium (COLACE) 100 MG Cap Take 1 Capsule by mouth 2 times a day as needed for Constipation. (Patient not taking: Reported on 5/1/2024)    Misc. Devices (BREAST PUMP) Misc For breastfeeding difficulty or milk storage (Patient not taking: Reported on 5/1/2024)    PRENATAL VIT-DOCUSATE-IRON-FA PO Take  by mouth. (Patient not taking: Reported on 5/1/2024)    magnesium oxide (MAG-OX) 400 MG Tab tablet Take 200 mg by mouth every day. (Patient not taking: Reported on 11/9/2023)       Social History     Socioeconomic History    Marital status:     Tobacco Use    Smoking status: Former     Current packs/day: 0.50     Average packs/day: 0.5 packs/day for 3.0 years (1.5 ttl pk-yrs)     Types: Cigarettes    Smokeless tobacco: Never    Tobacco comments:     Trying to quit, cut back to 3-4 cigarettes/per day   Vaping Use    Vaping status: Some Days   Substance and Sexual Activity    Alcohol use: Not Currently     Alcohol/week: 3.0 oz     Types: 5 Standard drinks or equivalent per week    Drug use: Not Currently     Types: Marijuana, Inhaled    Sexual activity: Yes     Partners: Male     Comment: None       Family History   Problem Relation Age of Onset    No Known Problems Mother     No Known Problems Father     No Known Problems Brother     Cancer Maternal Grandmother     No Known Problems Maternal Grandfather     No Known Problems Paternal Grandmother     No Known Problems Paternal Grandfather           Objective:   Vital measurements:  /64   Wt (!) 282 lb   BMI 41.64 kg/m²     Physical Exam   Physical Exam  Vitals reviewed.   Constitutional:       Appearance: Normal appearance.   Pulmonary:      Effort: Pulmonary effort is normal.   Neurological:      General: No focal deficit present.      Mental Status: She is alert and oriented to person, place, and time.   Psychiatric:         Mood and Affect: Mood normal.         Behavior: Behavior normal.          Problem List Items Addressed This Visit       HGSIL (high grade squamous intraepithelial lesion) on Pap smear of cervix     This is a 24 yo  here for discussion regarding colposcopy results.    Discuss coloposcopy results. Benign findings. Per ASCCP guidelines, follow up for a pap 1 year from colposcopy date, therefore return on approximately 2025.  Pt verbalized understanding and agrees to plan.

## 2024-06-21 NOTE — ASSESSMENT & PLAN NOTE
This is a 22 yo  here for discussion regarding colposcopy results.    Discuss coloposcopy results. Benign findings. Per ASCCP guidelines, follow up for a pap 1 year from colposcopy date, therefore return on approximately 2025.  Pt verbalized understanding and agrees to plan.

## 2024-07-05 ENCOUNTER — HOSPITAL ENCOUNTER (OUTPATIENT)
Dept: RADIOLOGY | Facility: MEDICAL CENTER | Age: 24
End: 2024-07-05
Payer: COMMERCIAL

## 2024-07-05 DIAGNOSIS — M54.41 ACUTE RIGHT-SIDED LOW BACK PAIN WITH RIGHT-SIDED SCIATICA: ICD-10-CM

## 2024-07-05 DIAGNOSIS — M25.551 RIGHT HIP PAIN: ICD-10-CM

## 2024-07-05 PROCEDURE — 73502 X-RAY EXAM HIP UNI 2-3 VIEWS: CPT | Mod: RT

## 2024-07-05 PROCEDURE — 72100 X-RAY EXAM L-S SPINE 2/3 VWS: CPT

## (undated) DEVICE — ELECTRODE 850 FOAM ADHESIVE - HYDROGEL RADIOTRNSPRNT (50/PK)

## (undated) DEVICE — CHLORAPREP 26 ML APPLICATOR - ORANGE TINT(25/CA)

## (undated) DEVICE — NEPTUNE 4 PORT MANIFOLD - (20/PK)

## (undated) DEVICE — MASK ANESTHESIA ADULT  - (100/CA)

## (undated) DEVICE — SENSOR SPO2 NEO LNCS ADHESIVE (20/BX) SEE USER NOTES

## (undated) DEVICE — LACTATED RINGERS INJ 1000 ML - (14EA/CA 60CA/PF)

## (undated) DEVICE — SET EXTENSION WITH 2 PORTS (48EA/CA) ***PART #2C8610 IS A SUBSTITUTE*****

## (undated) DEVICE — TROCAR 5X100 BLADED Z-THREAD - KII (6/BX)

## (undated) DEVICE — TUBE E-T HI-LO CUFF 7.5MM (10EA/PK)

## (undated) DEVICE — WATER IRRIG. STER. 1500 ML - (9/CA)

## (undated) DEVICE — DETERGENT RENUZYME PLUS 10 OZ PACKET (50/BX)

## (undated) DEVICE — PROTECTOR ULNA NERVE - (36PR/CA)

## (undated) DEVICE — SUCTION INSTRUMENT YANKAUER BULBOUS TIP W/O VENT (50EA/CA)

## (undated) DEVICE — CANISTER SUCTION 3000ML MECHANICAL FILTER AUTO SHUTOFF MEDI-VAC NONSTERILE LF DISP  (40EA/CA)

## (undated) DEVICE — SUTURE 4-0 VICRYL PLUS FS-2 - 27 INCH (36/BX)

## (undated) DEVICE — CANNULA W/SEAL 5X100 Z-THRE - ADED KII (12/BX)

## (undated) DEVICE — TROCAR Z THREAD12MM OPTICAL - NON BLADED (6/BX)

## (undated) DEVICE — SUTURE 0 COATED VICRYL 6-18IN - (12PK/BX)

## (undated) DEVICE — TUBE CONNECT SUCTION CLEAR 120 X 1/4" (50EA/CA)"

## (undated) DEVICE — SUTURE 0 VICRYL PLUS CT-2 - 27 INCH (36/BX)

## (undated) DEVICE — SCISSORS 5MM CVD (6EA/BX)

## (undated) DEVICE — TUBING CLEARLINK DUO-VENT - C-FLO (48EA/CA)

## (undated) DEVICE — BAG RETRIEVAL 10ML (10EA/BX)

## (undated) DEVICE — CLIP MED LG INTNL HRZN TI ESCP - (20/BX)

## (undated) DEVICE — GOWN WARMING STANDARD FLEX - (30/CA)

## (undated) DEVICE — ELECTRODE DUAL RETURN W/ CORD - (50/PK)

## (undated) DEVICE — HEAD HOLDER JUNIOR/ADULT

## (undated) DEVICE — GLOVE BIOGEL SZ 8 SURGICAL PF LTX - (50PR/BX 4BX/CA)

## (undated) DEVICE — SUTURE GENERAL

## (undated) DEVICE — SODIUM CHL IRRIGATION 0.9% 1000ML (12EA/CA)

## (undated) DEVICE — DERMABOND ADVANCED - (12EA/BX)

## (undated) DEVICE — PACK LAP CHOLE OR - (2EA/CA)

## (undated) DEVICE — KIT ROOM DECONTAMINATION

## (undated) DEVICE — SET LEADWIRE 5 LEAD BEDSIDE DISPOSABLE ECG (1SET OF 5/EA)

## (undated) DEVICE — KIT ANESTHESIA W/CIRCUIT & 3/LT BAG W/FILTER (20EA/CA)

## (undated) DEVICE — TROCAR LAPSCP 100MM 12MM NTHRD - (6/BX)

## (undated) DEVICE — TUBING INSUFFLATION - (10/BX)